# Patient Record
Sex: FEMALE | Race: OTHER
[De-identification: names, ages, dates, MRNs, and addresses within clinical notes are randomized per-mention and may not be internally consistent; named-entity substitution may affect disease eponyms.]

---

## 2017-12-27 ENCOUNTER — HOSPITAL ENCOUNTER (INPATIENT)
Dept: HOSPITAL 54 - ER | Age: 82
LOS: 14 days | Discharge: NURSING, REHAB OR LONG-TERM CARE | DRG: 870 | End: 2018-01-10
Attending: NURSE PRACTITIONER | Admitting: NURSE PRACTITIONER
Payer: MEDICARE

## 2017-12-27 VITALS — DIASTOLIC BLOOD PRESSURE: 93 MMHG | SYSTOLIC BLOOD PRESSURE: 145 MMHG

## 2017-12-27 VITALS — DIASTOLIC BLOOD PRESSURE: 71 MMHG | SYSTOLIC BLOOD PRESSURE: 132 MMHG

## 2017-12-27 VITALS — DIASTOLIC BLOOD PRESSURE: 46 MMHG | SYSTOLIC BLOOD PRESSURE: 144 MMHG

## 2017-12-27 VITALS — DIASTOLIC BLOOD PRESSURE: 75 MMHG | SYSTOLIC BLOOD PRESSURE: 145 MMHG

## 2017-12-27 VITALS — DIASTOLIC BLOOD PRESSURE: 71 MMHG | SYSTOLIC BLOOD PRESSURE: 128 MMHG

## 2017-12-27 VITALS — DIASTOLIC BLOOD PRESSURE: 61 MMHG | SYSTOLIC BLOOD PRESSURE: 114 MMHG

## 2017-12-27 VITALS — SYSTOLIC BLOOD PRESSURE: 129 MMHG | DIASTOLIC BLOOD PRESSURE: 76 MMHG

## 2017-12-27 VITALS — SYSTOLIC BLOOD PRESSURE: 125 MMHG | DIASTOLIC BLOOD PRESSURE: 65 MMHG

## 2017-12-27 VITALS — SYSTOLIC BLOOD PRESSURE: 129 MMHG | DIASTOLIC BLOOD PRESSURE: 71 MMHG

## 2017-12-27 VITALS — DIASTOLIC BLOOD PRESSURE: 64 MMHG | SYSTOLIC BLOOD PRESSURE: 142 MMHG

## 2017-12-27 VITALS — SYSTOLIC BLOOD PRESSURE: 142 MMHG | DIASTOLIC BLOOD PRESSURE: 90 MMHG

## 2017-12-27 VITALS — DIASTOLIC BLOOD PRESSURE: 47 MMHG | SYSTOLIC BLOOD PRESSURE: 144 MMHG

## 2017-12-27 VITALS — BODY MASS INDEX: 27.88 KG/M2 | HEIGHT: 60 IN | WEIGHT: 142 LBS

## 2017-12-27 VITALS — DIASTOLIC BLOOD PRESSURE: 63 MMHG | SYSTOLIC BLOOD PRESSURE: 134 MMHG

## 2017-12-27 VITALS — DIASTOLIC BLOOD PRESSURE: 67 MMHG | SYSTOLIC BLOOD PRESSURE: 124 MMHG

## 2017-12-27 VITALS — DIASTOLIC BLOOD PRESSURE: 84 MMHG | SYSTOLIC BLOOD PRESSURE: 142 MMHG

## 2017-12-27 VITALS — DIASTOLIC BLOOD PRESSURE: 69 MMHG | SYSTOLIC BLOOD PRESSURE: 127 MMHG

## 2017-12-27 VITALS — SYSTOLIC BLOOD PRESSURE: 126 MMHG | DIASTOLIC BLOOD PRESSURE: 71 MMHG

## 2017-12-27 VITALS — DIASTOLIC BLOOD PRESSURE: 96 MMHG | SYSTOLIC BLOOD PRESSURE: 147 MMHG

## 2017-12-27 VITALS — SYSTOLIC BLOOD PRESSURE: 134 MMHG | DIASTOLIC BLOOD PRESSURE: 63 MMHG

## 2017-12-27 VITALS — DIASTOLIC BLOOD PRESSURE: 88 MMHG | SYSTOLIC BLOOD PRESSURE: 128 MMHG

## 2017-12-27 VITALS — DIASTOLIC BLOOD PRESSURE: 43 MMHG | SYSTOLIC BLOOD PRESSURE: 130 MMHG

## 2017-12-27 VITALS — SYSTOLIC BLOOD PRESSURE: 115 MMHG | DIASTOLIC BLOOD PRESSURE: 45 MMHG

## 2017-12-27 VITALS — DIASTOLIC BLOOD PRESSURE: 73 MMHG | SYSTOLIC BLOOD PRESSURE: 121 MMHG

## 2017-12-27 VITALS — DIASTOLIC BLOOD PRESSURE: 80 MMHG | SYSTOLIC BLOOD PRESSURE: 137 MMHG

## 2017-12-27 VITALS — SYSTOLIC BLOOD PRESSURE: 130 MMHG | DIASTOLIC BLOOD PRESSURE: 68 MMHG

## 2017-12-27 VITALS — SYSTOLIC BLOOD PRESSURE: 128 MMHG | DIASTOLIC BLOOD PRESSURE: 71 MMHG

## 2017-12-27 VITALS — DIASTOLIC BLOOD PRESSURE: 90 MMHG | SYSTOLIC BLOOD PRESSURE: 155 MMHG

## 2017-12-27 VITALS — DIASTOLIC BLOOD PRESSURE: 63 MMHG | SYSTOLIC BLOOD PRESSURE: 114 MMHG

## 2017-12-27 VITALS — SYSTOLIC BLOOD PRESSURE: 123 MMHG | DIASTOLIC BLOOD PRESSURE: 71 MMHG

## 2017-12-27 VITALS — SYSTOLIC BLOOD PRESSURE: 126 MMHG | DIASTOLIC BLOOD PRESSURE: 65 MMHG

## 2017-12-27 VITALS — DIASTOLIC BLOOD PRESSURE: 67 MMHG | SYSTOLIC BLOOD PRESSURE: 120 MMHG

## 2017-12-27 DIAGNOSIS — K21.9: ICD-10-CM

## 2017-12-27 DIAGNOSIS — I11.0: ICD-10-CM

## 2017-12-27 DIAGNOSIS — N39.0: ICD-10-CM

## 2017-12-27 DIAGNOSIS — K94.22: ICD-10-CM
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
## 2017-12-27 DIAGNOSIS — Z99.11: ICD-10-CM

## 2017-12-27 DIAGNOSIS — L89.323: ICD-10-CM

## 2017-12-27 DIAGNOSIS — A41.9: Primary | ICD-10-CM
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
## 2017-12-27 DIAGNOSIS — L89.313: ICD-10-CM

## 2017-12-27 DIAGNOSIS — E43: ICD-10-CM

## 2017-12-27 DIAGNOSIS — I50.33: ICD-10-CM

## 2017-12-27 DIAGNOSIS — L89.324: ICD-10-CM

## 2017-12-27 DIAGNOSIS — L03.311: ICD-10-CM

## 2017-12-27 DIAGNOSIS — K31.6: ICD-10-CM

## 2017-12-27 DIAGNOSIS — D62: ICD-10-CM

## 2017-12-27 DIAGNOSIS — J96.21: ICD-10-CM

## 2017-12-27 DIAGNOSIS — Z87.81: ICD-10-CM

## 2017-12-27 DIAGNOSIS — L89.153: ICD-10-CM
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
## 2017-12-27 DIAGNOSIS — E87.0: ICD-10-CM

## 2017-12-27 DIAGNOSIS — F09: ICD-10-CM

## 2017-12-27 DIAGNOSIS — G93.40: ICD-10-CM

## 2017-12-27 DIAGNOSIS — K94.23: ICD-10-CM

## 2017-12-27 DIAGNOSIS — L89.314: ICD-10-CM

## 2017-12-27 DIAGNOSIS — F03.90: ICD-10-CM

## 2017-12-27 DIAGNOSIS — R13.10: ICD-10-CM

## 2017-12-27 DIAGNOSIS — B96.89: ICD-10-CM

## 2017-12-27 DIAGNOSIS — L89.619: ICD-10-CM

## 2017-12-27 DIAGNOSIS — D64.9: ICD-10-CM

## 2017-12-27 DIAGNOSIS — R53.2: ICD-10-CM

## 2017-12-27 DIAGNOSIS — Z79.899: ICD-10-CM

## 2017-12-27 DIAGNOSIS — L71.0: ICD-10-CM

## 2017-12-27 LAB
ALBUMIN SERPL BCP-MCNC: 1.2 G/DL (ref 3.4–5)
ALP SERPL-CCNC: 265 U/L (ref 46–116)
ALT SERPL W P-5'-P-CCNC: 27 U/L (ref 12–78)
APPEARANCE UR: (no result)
APTT PPP: 30 SEC (ref 23–34)
AST SERPL W P-5'-P-CCNC: 54 U/L (ref 15–37)
BASOPHILS # BLD AUTO: 0.1 /CMM (ref 0–0.2)
BASOPHILS NFR BLD AUTO: 0.4 % (ref 0–2)
BILIRUB DIRECT SERPL-MCNC: 0.2 MG/DL (ref 0–0.2)
BILIRUB SERPL-MCNC: 0.5 MG/DL (ref 0.2–1)
BILIRUB UR QL STRIP: NEGATIVE
BUN SERPL-MCNC: 43 MG/DL (ref 7–18)
CALCIUM SERPL-MCNC: 7.2 MG/DL (ref 8.5–10.1)
CHLORIDE SERPL-SCNC: 118 MMOL/L (ref 98–107)
CO2 SERPL-SCNC: 24 MMOL/L (ref 21–32)
COLOR UR: YELLOW
CREAT SERPL-MCNC: 0.9 MG/DL (ref 0.6–1.3)
EOSINOPHIL # BLD AUTO: 0.4 /CMM (ref 0–0.7)
EOSINOPHIL NFR BLD AUTO: 2.7 % (ref 0–6)
EOSINOPHIL NFR BLD MANUAL: 3 % (ref 0–4)
GLUCOSE SERPL-MCNC: 107 MG/DL (ref 74–106)
GLUCOSE UR STRIP-MCNC: NEGATIVE MG/DL
HCT VFR BLD AUTO: 21 % (ref 33–45)
HCT VFR BLD AUTO: 34 % (ref 33–45)
HGB BLD-MCNC: 11.2 G/DL (ref 11.5–14.8)
HGB BLD-MCNC: 6.7 G/DL (ref 11.5–14.8)
HGB UR QL STRIP: (no result) ERY/UL
INR PPP: 1.22 (ref 0.87–1.13)
KETONES UR STRIP-MCNC: NEGATIVE MG/DL
LEUKOCYTE ESTERASE UR QL STRIP: (no result)
LYMPHOCYTES NFR BLD AUTO: 17.4 % (ref 20–44)
LYMPHOCYTES NFR BLD AUTO: 2.4 /CMM (ref 0.8–4.8)
LYMPHOCYTES NFR BLD MANUAL: 20 % (ref 16–48)
MCH RBC QN AUTO: 31 PG (ref 26–33)
MCHC RBC AUTO-ENTMCNC: 33 G/DL (ref 31–36)
MCV RBC AUTO: 95 FL (ref 82–100)
MONOCYTES NFR BLD AUTO: 0.9 /CMM (ref 0.1–1.3)
MONOCYTES NFR BLD AUTO: 6.8 % (ref 2–12)
MONOCYTES NFR BLD MANUAL: 9 % (ref 0–11)
NEUTROPHILS # BLD AUTO: 10 /CMM (ref 1.8–8.9)
NEUTROPHILS NFR BLD AUTO: 72.7 % (ref 43–81)
NEUTS BAND NFR BLD MANUAL: 4 % (ref 0–5)
NEUTS SEG NFR BLD MANUAL: 64 % (ref 42–76)
NITRITE UR QL STRIP: POSITIVE
NT-PROBNP SERPL-MCNC: 6226 PG/ML (ref 0–125)
PH UR STRIP: 5.5 [PH] (ref 5–8)
PLATELET # BLD AUTO: 394 /CMM (ref 150–450)
POTASSIUM SERPL-SCNC: 4.6 MMOL/L (ref 3.5–5.1)
PROT SERPL-MCNC: 5.6 G/DL (ref 6.4–8.2)
PROT UR QL STRIP: (no result) MG/DL
PROTHROMBIN TIME: 12.7 SECS (ref 9.5–12.7)
RBC # BLD AUTO: 2.16 MIL/UL (ref 4–5.2)
RBC #/AREA URNS HPF: (no result) /HPF (ref 0–2)
RDW COEFFICIENT OF VARIATION: 24.7 (ref 11.5–15)
SODIUM SERPL-SCNC: 147 MMOL/L (ref 136–145)
TROPONIN I SERPL-MCNC: 0.03 NG/ML (ref 0–0.06)
UROBILINOGEN UR STRIP-MCNC: 0.2 EU/DL
WBC #/AREA URNS HPF: (no result) /HPF (ref 0–3)
WBC NRBC COR # BLD AUTO: 13.8 K/UL (ref 4.3–11)

## 2017-12-27 PROCEDURE — A6253 ABSORPT DRG > 48 SQ IN W/O B: HCPCS

## 2017-12-27 PROCEDURE — A7526 TRACHEOSTOMY TUBE COLLAR: HCPCS

## 2017-12-27 PROCEDURE — 30233N1 TRANSFUSION OF NONAUTOLOGOUS RED BLOOD CELLS INTO PERIPHERAL VEIN, PERCUTANEOUS APPROACH: ICD-10-PCS | Performed by: NURSE PRACTITIONER

## 2017-12-27 PROCEDURE — A6402 STERILE GAUZE <= 16 SQ IN: HCPCS

## 2017-12-27 PROCEDURE — B546ZZA ULTRASONOGRAPHY OF RIGHT SUBCLAVIAN VEIN, GUIDANCE: ICD-10-PCS | Performed by: NURSE PRACTITIONER

## 2017-12-27 PROCEDURE — Z7610: HCPCS

## 2017-12-27 PROCEDURE — A4216 STERILE WATER/SALINE, 10 ML: HCPCS

## 2017-12-27 PROCEDURE — 05H533Z INSERTION OF INFUSION DEVICE INTO RIGHT SUBCLAVIAN VEIN, PERCUTANEOUS APPROACH: ICD-10-PCS | Performed by: NURSE PRACTITIONER

## 2017-12-27 PROCEDURE — 0DC68ZZ EXTIRPATION OF MATTER FROM STOMACH, VIA NATURAL OR ARTIFICIAL OPENING ENDOSCOPIC: ICD-10-PCS

## 2017-12-27 PROCEDURE — 5A1955Z RESPIRATORY VENTILATION, GREATER THAN 96 CONSECUTIVE HOURS: ICD-10-PCS

## 2017-12-27 PROCEDURE — A6403 STERILE GAUZE>16 <= 48 SQ IN: HCPCS

## 2017-12-27 PROCEDURE — A4606 OXYGEN PROBE USED W OXIMETER: HCPCS

## 2017-12-27 PROCEDURE — 30233N1 TRANSFUSION OF NONAUTOLOGOUS RED BLOOD CELLS INTO PERIPHERAL VEIN, PERCUTANEOUS APPROACH: ICD-10-PCS | Performed by: STUDENT IN AN ORGANIZED HEALTH CARE EDUCATION/TRAINING PROGRAM

## 2017-12-27 PROCEDURE — A6248 HYDROGEL DRSG GEL FILLER: HCPCS

## 2017-12-27 RX ADMIN — FAMOTIDINE SCH MG: 20 TABLET, FILM COATED ORAL at 09:00

## 2017-12-27 RX ADMIN — OXYCODONE HYDROCHLORIDE AND ACETAMINOPHEN SCH MG: 500 TABLET ORAL at 10:01

## 2017-12-27 RX ADMIN — Medication SCH EACH: at 09:00

## 2017-12-27 RX ADMIN — Medication SCH EACH: at 17:18

## 2017-12-27 RX ADMIN — ALBUTEROL SULFATE SCH MG: 2.5 SOLUTION RESPIRATORY (INHALATION) at 13:29

## 2017-12-27 RX ADMIN — FAMOTIDINE SCH MG: 20 TABLET, FILM COATED ORAL at 17:18

## 2017-12-27 RX ADMIN — Medication SCH GM: at 12:09

## 2017-12-27 RX ADMIN — MEROPENEM SCH MLS/HR: 500 INJECTION INTRAVENOUS at 18:43

## 2017-12-27 RX ADMIN — Medication SCH MG: at 10:02

## 2017-12-27 RX ADMIN — THERA TABS SCH UDTAB: TAB at 09:57

## 2017-12-27 RX ADMIN — FERROUS SULFATE TAB 325 MG (65 MG ELEMENTAL FE) SCH MG: 325 (65 FE) TAB at 09:56

## 2017-12-27 RX ADMIN — ERGOCALCIFEROL SCH UNIT: 1.25 CAPSULE ORAL at 10:46

## 2017-12-27 RX ADMIN — OXYCODONE HYDROCHLORIDE AND ACETAMINOPHEN SCH MG: 500 TABLET ORAL at 17:18

## 2017-12-27 RX ADMIN — Medication SCH MG: at 19:30

## 2017-12-27 RX ADMIN — Medication SCH MG: at 13:29

## 2017-12-27 RX ADMIN — ALBUTEROL SULFATE SCH MG: 2.5 SOLUTION RESPIRATORY (INHALATION) at 19:30

## 2017-12-27 RX ADMIN — SODIUM CHLORIDE PRN MLS/HR: 9 INJECTION, SOLUTION INTRAVENOUS at 08:14

## 2017-12-27 NOTE — NUR
RT

PATIENT REC'D TRACHED ON [facility] VENT KAMALJIT WELL. VENT ALARMS CHECKED + AUDIBLE. SX'D WITH MOD 
AMT OF PALE YELLOW SEMITHICK SECRETIONS. B/S JAD. WON BAG AT HOB

-------------------------------------------------------------------------------

Addendum: 12/27/17 at 1248 by MARIA FERNANDA TIAN RT

-------------------------------------------------------------------------------

Amended: Links added.

## 2017-12-27 NOTE — NUR
PT BIB RA 90 WITH A C/O FEVER. PT IS TRACH VENTED WITH THE FOLLOWING SETTINGS: 
AC 12, , TOE043%, PEEP 5. PT IS NON RESPONSIVE TO PAINFUL STIMULI, PT'S 
BASELINE AS PER EMS. PT IS ON THE MONITOR AND CONTINUOUS PULSE OX. PT'S 
DAUGHTER IS IN THE LOBBY. PT HAS RUE PICC LINE, ALAMO TO GRAVITY, GTUBE, WOUND 
ON COCCYX AND RT HIP FX THAT HAS NOT BEEN CORRECTED.  PT HAS LLE DEFORMITY FROM 
CHILDHOOD.

## 2017-12-27 NOTE — NUR
-------------------------------------------------------------------------------

          *** Note undone in [facility] - 12/27/17 at 0504 by JEFF ***          

-------------------------------------------------------------------------------

US FINISHED

## 2017-12-27 NOTE — NUR
PT RECEIVED TRACHED SHLY 6 ON VENT. NO RESP DISTRESS. PT TOLERATING VENT SETTINGS. SX'D FOR 
MOD AMT OF TINGED SECRETIONS. VENT ALARMS SET AND AUDIBLE. AMBU BAG AT BEDSIDE. VENT PLUGGED 
INTO RED OUTLET. WILL CONTINUE TO MONITOR.

-------------------------------------------------------------------------------

Addendum: 12/27/17 at 2020 by ANDREE GRIJALVA RT

-------------------------------------------------------------------------------

Amended: Links added.

## 2017-12-27 NOTE — NUR
ICU RN- STOOL SAMPLE OBTAINED FOR STOOL OB AND PLACED IN FRIDGE FOR LAB . WILL 
CONTINUE TO MONITOR.

## 2017-12-27 NOTE — NUR
WOUND CARE CONSULT: PT PRESENTS WITH MULTIPLE WOUNDS INCLUDING RT BUTTOCK STAGE 4 ULCER, 
LEFT BUTTOCK AND SACRAL STAGE 3 ULCERS AND G TUBE SITE REDNESS, ALL PRESENT ON ADMISSION. PT 
IS TRACH-VENT DEPENDENT AND IMMOBILE. FIRST STEP MATTRESS IN USE. ALL SKIN PROTECTION AND 
WOUND RECOMMENDATIONS DISCUSSED WITH NURSING STAFF. RECOMMEND SURGICAL CONSULT. WILL SEE 
PRN. MD IN AGREEMENT WITH PLAN OF CARE.

## 2017-12-27 NOTE — NUR
ICU RN- MEDICATIONS NON-ADMINISTERED DUE TO LEAKING OF G-TUBE STOMA SITE. WILL INFORM SELAM GALLEGO DNP WHEN NP SEEING PT. WILL CONTINUE TO MONITOR.

## 2017-12-27 NOTE — NUR
ICU RN- EAGLE MONTOYA NP AT BEDSIDE. UPDATED HER ON PT'S G-TUBE: LEAKING, RED & SWOLLEN. PER 
NP, OBTAIN CONSENT FOR EGD AND POSSIBLE PEG TUBE REPLACEMENT.

1330- OBTAINED TELEPHONE CONSENT FROM PT'S DAUGHTER, LUCIO FOR EGD AND POSSIBLE PEG TUBE 
REPLACEMENT. VERIFIED WITH LEFTY MADRIGAL. 

1430- 1ST UNIT OF BLOOD COMPLETED. DR. HESTER AND OR STAFF AT BEDSIDE FOR EGD. WILL START 
2ND UNIT OF BLOOD ONCE EGD COMPLETED.

## 2017-12-28 VITALS — SYSTOLIC BLOOD PRESSURE: 152 MMHG | DIASTOLIC BLOOD PRESSURE: 51 MMHG

## 2017-12-28 VITALS — SYSTOLIC BLOOD PRESSURE: 151 MMHG | DIASTOLIC BLOOD PRESSURE: 86 MMHG

## 2017-12-28 VITALS — SYSTOLIC BLOOD PRESSURE: 146 MMHG | DIASTOLIC BLOOD PRESSURE: 86 MMHG

## 2017-12-28 VITALS — SYSTOLIC BLOOD PRESSURE: 135 MMHG | DIASTOLIC BLOOD PRESSURE: 91 MMHG

## 2017-12-28 VITALS — DIASTOLIC BLOOD PRESSURE: 82 MMHG | SYSTOLIC BLOOD PRESSURE: 142 MMHG

## 2017-12-28 VITALS — SYSTOLIC BLOOD PRESSURE: 137 MMHG | DIASTOLIC BLOOD PRESSURE: 89 MMHG

## 2017-12-28 LAB
BASOPHILS # BLD AUTO: 0.1 /CMM (ref 0–0.2)
BASOPHILS NFR BLD AUTO: 0.7 % (ref 0–2)
BUN SERPL-MCNC: 39 MG/DL (ref 7–18)
CALCIUM SERPL-MCNC: 7 MG/DL (ref 8.5–10.1)
CHLORIDE SERPL-SCNC: 118 MMOL/L (ref 98–107)
CHOLEST SERPL-MCNC: 112 MG/DL (ref ?–200)
CO2 SERPL-SCNC: 19 MMOL/L (ref 21–32)
CREAT SERPL-MCNC: 0.8 MG/DL (ref 0.6–1.3)
EOSINOPHIL # BLD AUTO: 0.3 /CMM (ref 0–0.7)
EOSINOPHIL NFR BLD AUTO: 2.1 % (ref 0–6)
GLUCOSE SERPL-MCNC: 79 MG/DL (ref 74–106)
HCT VFR BLD AUTO: 34 % (ref 33–45)
HDLC SERPL-MCNC: 10 MG/DL (ref 40–60)
HGB BLD-MCNC: 11.7 G/DL (ref 11.5–14.8)
LDLC SERPL DIRECT ASSAY-MCNC: 69 MG/DL (ref 0–99)
LYMPHOCYTES NFR BLD AUTO: 1.6 /CMM (ref 0.8–4.8)
LYMPHOCYTES NFR BLD AUTO: 11.9 % (ref 20–44)
MAGNESIUM SERPL-MCNC: 1.5 MG/DL (ref 1.8–2.4)
MCH RBC QN AUTO: 31 PG (ref 26–33)
MCHC RBC AUTO-ENTMCNC: 34 G/DL (ref 31–36)
MCV RBC AUTO: 90 FL (ref 82–100)
MONOCYTES NFR BLD AUTO: 0.8 /CMM (ref 0.1–1.3)
MONOCYTES NFR BLD AUTO: 5.6 % (ref 2–12)
NEUTROPHILS # BLD AUTO: 10.9 /CMM (ref 1.8–8.9)
NEUTROPHILS NFR BLD AUTO: 79.7 % (ref 43–81)
PHOSPHATE SERPL-MCNC: 3.3 MG/DL (ref 2.5–4.9)
PLATELET # BLD AUTO: 391 /CMM (ref 150–450)
POTASSIUM SERPL-SCNC: 4.2 MMOL/L (ref 3.5–5.1)
RBC # BLD AUTO: 3.79 MIL/UL (ref 4–5.2)
RDW COEFFICIENT OF VARIATION: 21.6 (ref 11.5–15)
SODIUM SERPL-SCNC: 147 MMOL/L (ref 136–145)
TRIGL SERPL-MCNC: 111 MG/DL (ref 30–150)
WBC NRBC COR # BLD AUTO: 13.6 K/UL (ref 4.3–11)

## 2017-12-28 RX ADMIN — FERROUS SULFATE TAB 325 MG (65 MG ELEMENTAL FE) SCH MG: 325 (65 FE) TAB at 09:35

## 2017-12-28 RX ADMIN — Medication SCH MG: at 07:49

## 2017-12-28 RX ADMIN — Medication PRN ML: at 18:17

## 2017-12-28 RX ADMIN — FAMOTIDINE SCH MG: 20 TABLET, FILM COATED ORAL at 09:36

## 2017-12-28 RX ADMIN — Medication SCH GM: at 09:36

## 2017-12-28 RX ADMIN — Medication SCH MG: at 09:34

## 2017-12-28 RX ADMIN — Medication SCH MG: at 20:09

## 2017-12-28 RX ADMIN — Medication SCH MG: at 01:30

## 2017-12-28 RX ADMIN — Medication PRN EACH: at 09:35

## 2017-12-28 RX ADMIN — SODIUM CHLORIDE PRN MLS/HR: 9 INJECTION, SOLUTION INTRAVENOUS at 17:37

## 2017-12-28 RX ADMIN — FAMOTIDINE SCH MG: 20 TABLET, FILM COATED ORAL at 17:35

## 2017-12-28 RX ADMIN — MAGNESIUM SULFATE IN DEXTROSE SCH MLS/HR: 10 INJECTION, SOLUTION INTRAVENOUS at 13:45

## 2017-12-28 RX ADMIN — ALBUTEROL SULFATE SCH MG: 2.5 SOLUTION RESPIRATORY (INHALATION) at 07:49

## 2017-12-28 RX ADMIN — Medication SCH MG: at 13:35

## 2017-12-28 RX ADMIN — THERA TABS SCH UDTAB: TAB at 09:36

## 2017-12-28 RX ADMIN — OXYCODONE HYDROCHLORIDE AND ACETAMINOPHEN SCH MG: 500 TABLET ORAL at 17:35

## 2017-12-28 RX ADMIN — MEROPENEM SCH MLS/HR: 500 INJECTION INTRAVENOUS at 05:22

## 2017-12-28 RX ADMIN — MEROPENEM SCH MLS/HR: 500 INJECTION INTRAVENOUS at 17:31

## 2017-12-28 RX ADMIN — OXYCODONE HYDROCHLORIDE AND ACETAMINOPHEN SCH MG: 500 TABLET ORAL at 09:35

## 2017-12-28 RX ADMIN — Medication SCH EACH: at 09:34

## 2017-12-28 RX ADMIN — Medication SCH EACH: at 17:35

## 2017-12-28 RX ADMIN — ALBUTEROL SULFATE SCH MG: 2.5 SOLUTION RESPIRATORY (INHALATION) at 01:30

## 2017-12-28 RX ADMIN — MAGNESIUM SULFATE IN DEXTROSE SCH MLS/HR: 10 INJECTION, SOLUTION INTRAVENOUS at 11:27

## 2017-12-28 RX ADMIN — ALBUTEROL SULFATE SCH MG: 2.5 SOLUTION RESPIRATORY (INHALATION) at 13:36

## 2017-12-28 RX ADMIN — ALBUTEROL SULFATE SCH MG: 2.5 SOLUTION RESPIRATORY (INHALATION) at 20:09

## 2017-12-28 NOTE — NUR
Received pt on vent support AC 12,450,35,+5, pt stable on current settings no distress noted 
at this time, WILL CONTINUE MONITORING PT PER MDS ORDERS. 

-------------------------------------------------------------------------------

Addendum: 12/28/17 at 2011 by GOMEZ VILLEGAS RT

-------------------------------------------------------------------------------

Amended: Links added.

## 2017-12-28 NOTE — NUR
TELE RN INITIAL NOTES



RECEIVED PATIENT ASLEEP, OBTUNDED, RESPONSIVE TO TOUCH.  NO S/S OF PAIN OR DISCOMFORT.  VENT 
DEPENDENT, WITH VENT SETTINGS AC 12, , FIO2 35%, PEEP 5, SPO2 100%.  NO RESPIRATORY 
DISTRESS NOTED.  ON TELE MONITOR SR.  WITH LEFT NARE NGT, PATENT, INTACT, IN PLACE.  GTF AT 
20ML/HR, NOTED WITH 20ML RESIDUAL, WILL TITRATE TO GOAL RATE OF 40ML/HR AS TOLERATED.  NOTED 
WITH GENERALIZED EDEMA.  IVF RUNNING.  HOB KEPT ELEVATED.  SIDE RAILS UP AND LOCKED.  BED 
KEPT AT LOWEST POSITION.  WILL CONTINUE TO MONITOR.

## 2017-12-28 NOTE — NUR
RN NOTE

TUBE FEEDING STARTED TODAY VIA NGT TUBE PER DIETITIAN RECOMMENDATION. WILL ENDORSE TO NIGHT 
SHIFT NURSE.

## 2017-12-28 NOTE — NUR
RN NOTE



PT REMAINS IN NO ACUTE DISTRESS IN BED. PT DID NOT HAVE ANY SIGNIFICANT CHANGE IN CONDITION 
DURING SHIFT. ALL NEEDS MET, ALL ORDERS CARRIED OUT. WILL ENDORSE CARE TO AM RN FOR 
CONTINUITY OF CARE.

## 2017-12-29 VITALS — DIASTOLIC BLOOD PRESSURE: 76 MMHG | SYSTOLIC BLOOD PRESSURE: 140 MMHG

## 2017-12-29 VITALS — SYSTOLIC BLOOD PRESSURE: 149 MMHG | DIASTOLIC BLOOD PRESSURE: 73 MMHG

## 2017-12-29 VITALS — SYSTOLIC BLOOD PRESSURE: 114 MMHG | DIASTOLIC BLOOD PRESSURE: 35 MMHG

## 2017-12-29 VITALS — SYSTOLIC BLOOD PRESSURE: 137 MMHG | DIASTOLIC BLOOD PRESSURE: 89 MMHG

## 2017-12-29 VITALS — SYSTOLIC BLOOD PRESSURE: 116 MMHG | DIASTOLIC BLOOD PRESSURE: 76 MMHG

## 2017-12-29 VITALS — DIASTOLIC BLOOD PRESSURE: 43 MMHG | SYSTOLIC BLOOD PRESSURE: 149 MMHG

## 2017-12-29 RX ADMIN — ALBUTEROL SULFATE SCH MG: 2.5 SOLUTION RESPIRATORY (INHALATION) at 13:30

## 2017-12-29 RX ADMIN — OXYCODONE HYDROCHLORIDE AND ACETAMINOPHEN SCH MG: 500 TABLET ORAL at 09:09

## 2017-12-29 RX ADMIN — FERROUS SULFATE TAB 325 MG (65 MG ELEMENTAL FE) SCH MG: 325 (65 FE) TAB at 09:09

## 2017-12-29 RX ADMIN — OXYCODONE HYDROCHLORIDE AND ACETAMINOPHEN SCH MG: 500 TABLET ORAL at 17:21

## 2017-12-29 RX ADMIN — FAMOTIDINE SCH MG: 20 TABLET, FILM COATED ORAL at 09:09

## 2017-12-29 RX ADMIN — ALBUTEROL SULFATE SCH MG: 2.5 SOLUTION RESPIRATORY (INHALATION) at 07:34

## 2017-12-29 RX ADMIN — MEROPENEM SCH MLS/HR: 500 INJECTION INTRAVENOUS at 17:21

## 2017-12-29 RX ADMIN — Medication SCH MG: at 13:30

## 2017-12-29 RX ADMIN — FAMOTIDINE SCH MG: 20 TABLET, FILM COATED ORAL at 17:21

## 2017-12-29 RX ADMIN — SODIUM CHLORIDE PRN MLS/HR: 9 INJECTION, SOLUTION INTRAVENOUS at 23:48

## 2017-12-29 RX ADMIN — MEROPENEM SCH MLS/HR: 500 INJECTION INTRAVENOUS at 05:21

## 2017-12-29 RX ADMIN — Medication SCH MG: at 20:10

## 2017-12-29 RX ADMIN — Medication SCH MG: at 01:42

## 2017-12-29 RX ADMIN — THERA TABS SCH UDTAB: TAB at 09:09

## 2017-12-29 RX ADMIN — DEXTROSE MONOHYDRATE SCH MLS/HR: 50 INJECTION, SOLUTION INTRAVENOUS at 20:56

## 2017-12-29 RX ADMIN — Medication SCH EACH: at 17:20

## 2017-12-29 RX ADMIN — Medication SCH EACH: at 09:09

## 2017-12-29 RX ADMIN — SODIUM CHLORIDE PRN MLS/HR: 9 INJECTION, SOLUTION INTRAVENOUS at 05:21

## 2017-12-29 RX ADMIN — Medication SCH MG: at 07:34

## 2017-12-29 RX ADMIN — ALBUTEROL SULFATE SCH MG: 2.5 SOLUTION RESPIRATORY (INHALATION) at 01:42

## 2017-12-29 RX ADMIN — ALBUTEROL SULFATE SCH MG: 2.5 SOLUTION RESPIRATORY (INHALATION) at 20:10

## 2017-12-29 RX ADMIN — Medication SCH MG: at 09:09

## 2017-12-29 RX ADMIN — Medication SCH GM: at 09:09

## 2017-12-29 NOTE — NUR
RN NOTES



RECEIVED PATIENT IN BED WITH EYES CLOSED. NO DISTRESS NOTED. BREATHING EVEN AND UNLABORED. 
VENT SETTING WELL TOLERATED. NON VERBAL. NO PHYSICAL MANIFESTATION OF PAIN OR DISCOMFORT. FC 
PATENT AND INTACT DRAINING CLEAR YELLOW WITH NO DOUL ORDOR URINE. FEEDING WELL TOLERATED VIA 
NGT TO LEFT NARE. NO RESIDUAL NOTED. VITAL SIGNS WNL. KEPT CLEAN AND DRY. WILL CONTINUE TO 
MONITOR.

## 2017-12-29 NOTE — NUR
PT REC'D ON [facility] VENT ON NOTED SETTINGS AS CHARTED. PT IS COMFORTABLE AND NO RESPIRATORY 
DISTRESS NOTED. VENT PLUGGED INTO RED OUTLET, AMBU BAG BEDSIDE, ALARMS ARE SET AND AUDIBLE 
PER POLICY. WILL CONTINUE TO MONITOR.

-------------------------------------------------------------------------------

Addendum: 12/30/17 at 0538 by J CARLOS BREAUX RT

-------------------------------------------------------------------------------

Amended: Links added.

## 2017-12-30 VITALS — DIASTOLIC BLOOD PRESSURE: 62 MMHG | SYSTOLIC BLOOD PRESSURE: 123 MMHG

## 2017-12-30 VITALS — DIASTOLIC BLOOD PRESSURE: 66 MMHG | SYSTOLIC BLOOD PRESSURE: 127 MMHG

## 2017-12-30 VITALS — SYSTOLIC BLOOD PRESSURE: 127 MMHG | DIASTOLIC BLOOD PRESSURE: 106 MMHG

## 2017-12-30 VITALS — DIASTOLIC BLOOD PRESSURE: 60 MMHG | SYSTOLIC BLOOD PRESSURE: 157 MMHG

## 2017-12-30 VITALS — DIASTOLIC BLOOD PRESSURE: 56 MMHG | SYSTOLIC BLOOD PRESSURE: 145 MMHG

## 2017-12-30 VITALS — DIASTOLIC BLOOD PRESSURE: 50 MMHG | SYSTOLIC BLOOD PRESSURE: 158 MMHG

## 2017-12-30 VITALS — DIASTOLIC BLOOD PRESSURE: 50 MMHG | SYSTOLIC BLOOD PRESSURE: 133 MMHG

## 2017-12-30 LAB
BASOPHILS # BLD AUTO: 0 /CMM (ref 0–0.2)
BASOPHILS NFR BLD AUTO: 0.3 % (ref 0–2)
BUN SERPL-MCNC: 45 MG/DL (ref 7–18)
CALCIUM SERPL-MCNC: 7.3 MG/DL (ref 8.5–10.1)
CHLORIDE SERPL-SCNC: 119 MMOL/L (ref 98–107)
CO2 SERPL-SCNC: 18 MMOL/L (ref 21–32)
CREAT SERPL-MCNC: 0.8 MG/DL (ref 0.6–1.3)
EOSINOPHIL # BLD AUTO: 0.5 /CMM (ref 0–0.7)
EOSINOPHIL NFR BLD AUTO: 4.8 % (ref 0–6)
GLUCOSE SERPL-MCNC: 93 MG/DL (ref 74–106)
HCT VFR BLD AUTO: 32 % (ref 33–45)
HGB BLD-MCNC: 10.9 G/DL (ref 11.5–14.8)
LYMPHOCYTES NFR BLD AUTO: 17.3 % (ref 20–44)
LYMPHOCYTES NFR BLD AUTO: 2 /CMM (ref 0.8–4.8)
MAGNESIUM SERPL-MCNC: 1.8 MG/DL (ref 1.8–2.4)
MCH RBC QN AUTO: 31 PG (ref 26–33)
MCHC RBC AUTO-ENTMCNC: 34 G/DL (ref 31–36)
MCV RBC AUTO: 90 FL (ref 82–100)
MONOCYTES NFR BLD AUTO: 1.1 /CMM (ref 0.1–1.3)
MONOCYTES NFR BLD AUTO: 9.9 % (ref 2–12)
NEUTROPHILS # BLD AUTO: 7.8 /CMM (ref 1.8–8.9)
NEUTROPHILS NFR BLD AUTO: 67.7 % (ref 43–81)
PHOSPHATE SERPL-MCNC: 3.3 MG/DL (ref 2.5–4.9)
PLATELET # BLD AUTO: 467 /CMM (ref 150–450)
POTASSIUM SERPL-SCNC: 3.9 MMOL/L (ref 3.5–5.1)
RBC # BLD AUTO: 3.54 MIL/UL (ref 4–5.2)
RDW COEFFICIENT OF VARIATION: 21.1 (ref 11.5–15)
SODIUM SERPL-SCNC: 147 MMOL/L (ref 136–145)
WBC NRBC COR # BLD AUTO: 11.4 K/UL (ref 4.3–11)

## 2017-12-30 RX ADMIN — Medication SCH GM: at 08:04

## 2017-12-30 RX ADMIN — SODIUM CHLORIDE PRN MLS/HR: 9 INJECTION, SOLUTION INTRAVENOUS at 20:56

## 2017-12-30 RX ADMIN — FAMOTIDINE SCH MG: 20 TABLET, FILM COATED ORAL at 08:04

## 2017-12-30 RX ADMIN — Medication SCH MG: at 07:34

## 2017-12-30 RX ADMIN — Medication SCH MG: at 01:57

## 2017-12-30 RX ADMIN — Medication SCH EACH: at 08:04

## 2017-12-30 RX ADMIN — OXYCODONE HYDROCHLORIDE AND ACETAMINOPHEN SCH MG: 500 TABLET ORAL at 16:04

## 2017-12-30 RX ADMIN — ALBUTEROL SULFATE SCH MG: 2.5 SOLUTION RESPIRATORY (INHALATION) at 13:47

## 2017-12-30 RX ADMIN — Medication SCH MG: at 13:47

## 2017-12-30 RX ADMIN — ALBUTEROL SULFATE SCH MG: 2.5 SOLUTION RESPIRATORY (INHALATION) at 20:12

## 2017-12-30 RX ADMIN — ALBUTEROL SULFATE SCH MG: 2.5 SOLUTION RESPIRATORY (INHALATION) at 01:57

## 2017-12-30 RX ADMIN — ALBUTEROL SULFATE SCH MG: 2.5 SOLUTION RESPIRATORY (INHALATION) at 07:34

## 2017-12-30 RX ADMIN — DEXTROSE MONOHYDRATE SCH MLS/HR: 50 INJECTION, SOLUTION INTRAVENOUS at 04:41

## 2017-12-30 RX ADMIN — Medication SCH MG: at 20:12

## 2017-12-30 RX ADMIN — OXYCODONE HYDROCHLORIDE AND ACETAMINOPHEN SCH MG: 500 TABLET ORAL at 08:04

## 2017-12-30 RX ADMIN — FERROUS SULFATE TAB 325 MG (65 MG ELEMENTAL FE) SCH MG: 325 (65 FE) TAB at 08:04

## 2017-12-30 RX ADMIN — DEXTROSE MONOHYDRATE SCH MLS/HR: 50 INJECTION, SOLUTION INTRAVENOUS at 20:55

## 2017-12-30 RX ADMIN — Medication SCH MG: at 08:04

## 2017-12-30 RX ADMIN — THERA TABS SCH UDTAB: TAB at 08:04

## 2017-12-30 RX ADMIN — DEXTROSE MONOHYDRATE SCH MLS/HR: 50 INJECTION, SOLUTION INTRAVENOUS at 11:09

## 2017-12-30 RX ADMIN — FAMOTIDINE SCH MG: 20 TABLET, FILM COATED ORAL at 16:04

## 2017-12-30 RX ADMIN — Medication PRN ML: at 08:17

## 2017-12-30 RX ADMIN — Medication SCH EACH: at 16:04

## 2017-12-30 NOTE — NUR
Trach pt received, tolerating current vent settings well throughout the shift.  Pt trach is 
secure.   Vent is plugged into a red outlet, alarms are set and audible, and BVM is at 
bedside.

-------------------------------------------------------------------------------

Addendum: 12/30/17 at 1728 by HUNTER TIWARI RT

-------------------------------------------------------------------------------

Amended: Links added.

## 2017-12-30 NOTE — NUR
TELE RN CLOSING NOTES

PATIENT IS RESTING IN BED IN NO APPARENT DISTRESS. BEDSIDE RAILS ARE UP X2. BED IS LOCKED 
AND LOWERED. CALL LIGHT IS WITHIN REACH. WILL ENDORSE CARE TO NIGHT SHIFT NURSE FOR MAISHA.

## 2017-12-30 NOTE — NUR
RN CLOSING NOTES



NO SIGNIFICANT CHANGE OF CONDITION. NO DISTRESS NOTED. BREATHING EVEN AND UNLABORED. VITAL 
SIGNS WNL. OBTUNDED, NON VERBAL. WILL ENDORSE TO AM SHIFT FOR CONTINUITY OF CARE.

## 2017-12-30 NOTE — NUR
TAYO RN OPENING NOTES

RECEIVED PATIENT IN STABLE CONDITION. IN NO APPARENT DISTRESS. PATIENT IS RESTING IN BED. 
BEDSIDE RAILS ARE UP X2. BED IS LOCKED AND LOWERED. WILL CONTINUE TO MONITOR.

## 2017-12-31 VITALS — DIASTOLIC BLOOD PRESSURE: 92 MMHG | SYSTOLIC BLOOD PRESSURE: 179 MMHG

## 2017-12-31 VITALS — DIASTOLIC BLOOD PRESSURE: 79 MMHG | SYSTOLIC BLOOD PRESSURE: 140 MMHG

## 2017-12-31 VITALS — SYSTOLIC BLOOD PRESSURE: 180 MMHG | DIASTOLIC BLOOD PRESSURE: 90 MMHG

## 2017-12-31 VITALS — SYSTOLIC BLOOD PRESSURE: 146 MMHG | DIASTOLIC BLOOD PRESSURE: 50 MMHG

## 2017-12-31 VITALS — DIASTOLIC BLOOD PRESSURE: 88 MMHG | SYSTOLIC BLOOD PRESSURE: 139 MMHG

## 2017-12-31 VITALS — SYSTOLIC BLOOD PRESSURE: 133 MMHG | DIASTOLIC BLOOD PRESSURE: 84 MMHG

## 2017-12-31 VITALS — SYSTOLIC BLOOD PRESSURE: 102 MMHG

## 2017-12-31 LAB
BASOPHILS # BLD AUTO: 0.1 /CMM (ref 0–0.2)
BASOPHILS NFR BLD AUTO: 0.7 % (ref 0–2)
BUN SERPL-MCNC: 50 MG/DL (ref 7–18)
CALCIUM SERPL-MCNC: 7.3 MG/DL (ref 8.5–10.1)
CHLORIDE SERPL-SCNC: 123 MMOL/L (ref 98–107)
CO2 SERPL-SCNC: 18 MMOL/L (ref 21–32)
CREAT SERPL-MCNC: 0.7 MG/DL (ref 0.6–1.3)
EOSINOPHIL # BLD AUTO: 0.7 /CMM (ref 0–0.7)
EOSINOPHIL NFR BLD AUTO: 5.5 % (ref 0–6)
GLUCOSE SERPL-MCNC: 103 MG/DL (ref 74–106)
HCT VFR BLD AUTO: 32 % (ref 33–45)
HGB BLD-MCNC: 11 G/DL (ref 11.5–14.8)
LYMPHOCYTES NFR BLD AUTO: 1.9 /CMM (ref 0.8–4.8)
LYMPHOCYTES NFR BLD AUTO: 15.9 % (ref 20–44)
MAGNESIUM SERPL-MCNC: 1.8 MG/DL (ref 1.8–2.4)
MCH RBC QN AUTO: 31 PG (ref 26–33)
MCHC RBC AUTO-ENTMCNC: 34 G/DL (ref 31–36)
MCV RBC AUTO: 91 FL (ref 82–100)
MONOCYTES NFR BLD AUTO: 1 /CMM (ref 0.1–1.3)
MONOCYTES NFR BLD AUTO: 7.8 % (ref 2–12)
NEUTROPHILS # BLD AUTO: 8.5 /CMM (ref 1.8–8.9)
NEUTROPHILS NFR BLD AUTO: 70.1 % (ref 43–81)
PHOSPHATE SERPL-MCNC: 3.6 MG/DL (ref 2.5–4.9)
PLATELET # BLD AUTO: 481 /CMM (ref 150–450)
POTASSIUM SERPL-SCNC: 4.2 MMOL/L (ref 3.5–5.1)
RBC # BLD AUTO: 3.55 MIL/UL (ref 4–5.2)
RDW COEFFICIENT OF VARIATION: 21.6 (ref 11.5–15)
SODIUM SERPL-SCNC: 152 MMOL/L (ref 136–145)
WBC NRBC COR # BLD AUTO: 12.2 K/UL (ref 4.3–11)

## 2017-12-31 RX ADMIN — Medication PRN ML: at 12:31

## 2017-12-31 RX ADMIN — Medication SCH MG: at 02:14

## 2017-12-31 RX ADMIN — DEXTROSE MONOHYDRATE SCH MLS/HR: 50 INJECTION, SOLUTION INTRAVENOUS at 04:48

## 2017-12-31 RX ADMIN — OXYCODONE HYDROCHLORIDE AND ACETAMINOPHEN SCH MG: 500 TABLET ORAL at 16:28

## 2017-12-31 RX ADMIN — ALBUTEROL SULFATE SCH MG: 2.5 SOLUTION RESPIRATORY (INHALATION) at 02:14

## 2017-12-31 RX ADMIN — FAMOTIDINE SCH MG: 20 TABLET, FILM COATED ORAL at 16:28

## 2017-12-31 RX ADMIN — ALBUTEROL SULFATE SCH MG: 2.5 SOLUTION RESPIRATORY (INHALATION) at 07:21

## 2017-12-31 RX ADMIN — FERROUS SULFATE TAB 325 MG (65 MG ELEMENTAL FE) SCH MG: 325 (65 FE) TAB at 09:08

## 2017-12-31 RX ADMIN — DEXTROSE MONOHYDRATE SCH MLS/HR: 50 INJECTION, SOLUTION INTRAVENOUS at 21:39

## 2017-12-31 RX ADMIN — Medication SCH GM: at 09:09

## 2017-12-31 RX ADMIN — ALBUTEROL SULFATE SCH MG: 2.5 SOLUTION RESPIRATORY (INHALATION) at 20:22

## 2017-12-31 RX ADMIN — FAMOTIDINE SCH MG: 20 TABLET, FILM COATED ORAL at 09:09

## 2017-12-31 RX ADMIN — ALBUTEROL SULFATE SCH MG: 2.5 SOLUTION RESPIRATORY (INHALATION) at 13:25

## 2017-12-31 RX ADMIN — Medication SCH MG: at 20:22

## 2017-12-31 RX ADMIN — Medication SCH MG: at 13:25

## 2017-12-31 RX ADMIN — Medication SCH EACH: at 09:09

## 2017-12-31 RX ADMIN — DEXTROSE MONOHYDRATE SCH MLS/HR: 50 INJECTION, SOLUTION INTRAVENOUS at 12:20

## 2017-12-31 RX ADMIN — Medication SCH EACH: at 16:28

## 2017-12-31 RX ADMIN — SODIUM CHLORIDE PRN MLS/HR: 4.5 INJECTION, SOLUTION INTRAVENOUS at 13:39

## 2017-12-31 RX ADMIN — THERA TABS SCH UDTAB: TAB at 09:08

## 2017-12-31 RX ADMIN — OXYCODONE HYDROCHLORIDE AND ACETAMINOPHEN SCH MG: 500 TABLET ORAL at 09:09

## 2017-12-31 RX ADMIN — Medication SCH MG: at 07:21

## 2017-12-31 RX ADMIN — Medication SCH MG: at 09:08

## 2017-12-31 NOTE — NUR
Pt received on mechanical vent, tolerating current vent settings well.  Pt trach is secure.  
Vent is plugged into a red outlet, alarms are set and audible, and BVM is at bedside.

-------------------------------------------------------------------------------

Addendum: 12/31/17 at 1718 by HUNTER TIWARI RT

-------------------------------------------------------------------------------

Amended: Links added.

## 2017-12-31 NOTE — NUR
TELE RN NOTE 

 SPOKE WITH SOULEYMANE CELAYA RN NP NOTIFIED THAT /90 WITH ORDER HYDRALAZINE 25 NG VIA GTUBE 
TIME ONE ,ORDER CARRIED OUT

## 2017-12-31 NOTE — NUR
RN INITIAL NOTES



Pt received on mechanical vent, tolerating current vent settings well.  Pt trach is secure.  
Vent is plugged into a red outlet, alarms are set and audible, and BVM is at bedside.Will 
continue to monitor pt.

## 2017-12-31 NOTE — NUR
TELE RN NOTE

  PER SOULEYMANE CELAYA OK TO GIVE IVF AT 80 ML PER HOUR, AWARE THAT BOTH HANDS WITH SEVERE EDEMA 
STATED OK TO  CONT TO GIVE, WILL F]U

## 2017-12-31 NOTE — NUR
RT



PT RECEIVED ON [facility] VENT WITH NOTED SETTING. VENT PLUGGED IN TO RED OUTLET. ALARMS SET AND 
AUDIBLE. AMBU BAG AT [facility]. NO SOB OR RESP DISTRESS NOTED ON SHIFT. HHN TX GIVEN AS ORDERED 
WITH NO ADVERSE REACTIONS.

-------------------------------------------------------------------------------

Addendum: 12/31/17 at 0558 by ERNST AMOS RT

-------------------------------------------------------------------------------

Amended: Links added.

## 2017-12-31 NOTE — NUR
TELE RN NOTE 

 KEEP CLEAN DRY FAMILY AT BEDSIDE, NOT IN ACUTE DISTRESS, WILL CONT TO MONITOR CLOSELY

## 2018-01-01 VITALS — SYSTOLIC BLOOD PRESSURE: 133 MMHG | DIASTOLIC BLOOD PRESSURE: 55 MMHG

## 2018-01-01 VITALS — DIASTOLIC BLOOD PRESSURE: 66 MMHG | SYSTOLIC BLOOD PRESSURE: 136 MMHG

## 2018-01-01 VITALS — SYSTOLIC BLOOD PRESSURE: 136 MMHG | DIASTOLIC BLOOD PRESSURE: 66 MMHG

## 2018-01-01 VITALS — DIASTOLIC BLOOD PRESSURE: 56 MMHG | SYSTOLIC BLOOD PRESSURE: 139 MMHG

## 2018-01-01 VITALS — SYSTOLIC BLOOD PRESSURE: 139 MMHG | DIASTOLIC BLOOD PRESSURE: 56 MMHG

## 2018-01-01 VITALS — DIASTOLIC BLOOD PRESSURE: 55 MMHG | SYSTOLIC BLOOD PRESSURE: 177 MMHG

## 2018-01-01 VITALS — SYSTOLIC BLOOD PRESSURE: 140 MMHG | DIASTOLIC BLOOD PRESSURE: 82 MMHG

## 2018-01-01 LAB
BASOPHILS # BLD AUTO: 0.1 /CMM (ref 0–0.2)
BASOPHILS NFR BLD AUTO: 1 % (ref 0–2)
BUN SERPL-MCNC: 44 MG/DL (ref 7–18)
CALCIUM SERPL-MCNC: 7.1 MG/DL (ref 8.5–10.1)
CHLORIDE SERPL-SCNC: 118 MMOL/L (ref 98–107)
CO2 SERPL-SCNC: 18 MMOL/L (ref 21–32)
CREAT SERPL-MCNC: 0.7 MG/DL (ref 0.6–1.3)
EOSINOPHIL # BLD AUTO: 0.6 /CMM (ref 0–0.7)
EOSINOPHIL NFR BLD AUTO: 4.8 % (ref 0–6)
GLUCOSE SERPL-MCNC: 83 MG/DL (ref 74–106)
HCT VFR BLD AUTO: 32 % (ref 33–45)
HGB BLD-MCNC: 10.7 G/DL (ref 11.5–14.8)
LYMPHOCYTES NFR BLD AUTO: 1.6 /CMM (ref 0.8–4.8)
LYMPHOCYTES NFR BLD AUTO: 12.2 % (ref 20–44)
MAGNESIUM SERPL-MCNC: 1.6 MG/DL (ref 1.8–2.4)
MCH RBC QN AUTO: 30 PG (ref 26–33)
MCHC RBC AUTO-ENTMCNC: 34 G/DL (ref 31–36)
MCV RBC AUTO: 90 FL (ref 82–100)
MONOCYTES NFR BLD AUTO: 1.1 /CMM (ref 0.1–1.3)
MONOCYTES NFR BLD AUTO: 8.1 % (ref 2–12)
NEUTROPHILS # BLD AUTO: 9.9 /CMM (ref 1.8–8.9)
NEUTROPHILS NFR BLD AUTO: 73.9 % (ref 43–81)
PHOSPHATE SERPL-MCNC: 3.2 MG/DL (ref 2.5–4.9)
PLATELET # BLD AUTO: 480 /CMM (ref 150–450)
POTASSIUM SERPL-SCNC: 3.6 MMOL/L (ref 3.5–5.1)
RBC # BLD AUTO: 3.53 MIL/UL (ref 4–5.2)
RDW COEFFICIENT OF VARIATION: 21 (ref 11.5–15)
SODIUM SERPL-SCNC: 145 MMOL/L (ref 136–145)
WBC NRBC COR # BLD AUTO: 13.3 K/UL (ref 4.3–11)

## 2018-01-01 RX ADMIN — FAMOTIDINE SCH MG: 20 TABLET, FILM COATED ORAL at 17:15

## 2018-01-01 RX ADMIN — SODIUM CHLORIDE PRN MLS/HR: 4.5 INJECTION, SOLUTION INTRAVENOUS at 04:19

## 2018-01-01 RX ADMIN — Medication SCH MG: at 07:26

## 2018-01-01 RX ADMIN — FERROUS SULFATE TAB 325 MG (65 MG ELEMENTAL FE) SCH MG: 325 (65 FE) TAB at 09:53

## 2018-01-01 RX ADMIN — Medication SCH EACH: at 09:53

## 2018-01-01 RX ADMIN — DEXTROSE MONOHYDRATE SCH MLS/HR: 50 INJECTION, SOLUTION INTRAVENOUS at 19:48

## 2018-01-01 RX ADMIN — ALBUTEROL SULFATE SCH MG: 2.5 SOLUTION RESPIRATORY (INHALATION) at 13:31

## 2018-01-01 RX ADMIN — Medication SCH MG: at 02:17

## 2018-01-01 RX ADMIN — THERA TABS SCH UDTAB: TAB at 09:53

## 2018-01-01 RX ADMIN — DEXTROSE MONOHYDRATE SCH MLS/HR: 50 INJECTION, SOLUTION INTRAVENOUS at 12:20

## 2018-01-01 RX ADMIN — ALBUTEROL SULFATE SCH MG: 2.5 SOLUTION RESPIRATORY (INHALATION) at 02:17

## 2018-01-01 RX ADMIN — Medication SCH MG: at 09:53

## 2018-01-01 RX ADMIN — ALBUTEROL SULFATE SCH MG: 2.5 SOLUTION RESPIRATORY (INHALATION) at 19:06

## 2018-01-01 RX ADMIN — DEXTROSE MONOHYDRATE SCH MLS/HR: 50 INJECTION, SOLUTION INTRAVENOUS at 04:22

## 2018-01-01 RX ADMIN — MAGNESIUM SULFATE IN DEXTROSE SCH MLS/HR: 10 INJECTION, SOLUTION INTRAVENOUS at 14:02

## 2018-01-01 RX ADMIN — Medication SCH EACH: at 17:15

## 2018-01-01 RX ADMIN — Medication SCH GM: at 09:54

## 2018-01-01 RX ADMIN — OXYCODONE HYDROCHLORIDE AND ACETAMINOPHEN SCH MG: 500 TABLET ORAL at 09:53

## 2018-01-01 RX ADMIN — OXYCODONE HYDROCHLORIDE AND ACETAMINOPHEN SCH MG: 500 TABLET ORAL at 17:15

## 2018-01-01 RX ADMIN — ALBUTEROL SULFATE SCH MG: 2.5 SOLUTION RESPIRATORY (INHALATION) at 07:26

## 2018-01-01 RX ADMIN — FAMOTIDINE SCH MG: 20 TABLET, FILM COATED ORAL at 09:53

## 2018-01-01 RX ADMIN — Medication SCH MG: at 19:06

## 2018-01-01 RX ADMIN — MAGNESIUM SULFATE IN DEXTROSE SCH MLS/HR: 10 INJECTION, SOLUTION INTRAVENOUS at 12:57

## 2018-01-01 RX ADMIN — SODIUM CHLORIDE PRN MLS/HR: 4.5 INJECTION, SOLUTION INTRAVENOUS at 19:54

## 2018-01-01 RX ADMIN — Medication SCH MG: at 13:31

## 2018-01-01 NOTE — NUR
RT NOTE:



PT RECEIVED ON MECH VENT WITH NOTED SETTING. MLT DONE. VENT PLUGGED INTO RED OUTLET. ALARMS 
SET AND AUDIBLE. AMBU BAG AT HOB. NO SOB OR RESP DISTRESS NOTED ON SHIFT. PRN SX GIVEN AS 
NEEDED. Q6 HHN TX GIVEN AS ORDERED WITH NO ADVERSE REACTIONS. WILL CONT TO MONITOR PT.

## 2018-01-01 NOTE — NUR
RT



PT RECEIVED ON [facility] VENT WITH NOTED SETTING. VENT PLUGGED IN TO RED OUTLET. ALARMS SET AND 
AUDIBLE. AMBU BAG AT [facility]. NO SOB OR RESP DISTRESS NOTED ON SHIFT. HHN TX GIVEN AS ORDERED 
WITH NO ADVERSE REACTIONS.

---------------------------------------------------

-------------------------------------------------------------------------------

Addendum: 01/01/18 at 0530 by ERNST AMOS RT

-------------------------------------------------------------------------------

Amended: Links added.

## 2018-01-01 NOTE — NUR
RN CLOSING NOTES



NO CHANGES IN PT CONDITION OVER NIGHT. Pt received on mechanical vent, tolerating current 
vent settings well.  Pt trach is secure.  Vent is plugged into a red outlet, alarms are set 
and audible, and BVM is at bedside.Will endorse to Am RN.

## 2018-01-01 NOTE — NUR
RN CLOSING NOTES



PT IS STABLE AND RESTING IN BED. NO S/S OF RESP DISTRESS OR SOB. PT DOES NOT APPEAR TO BE IN 
PAIN. VENT SETTINGS IN PLACE. PER MD ORDER, PT IS NPO, TUBE FEEDING STOPPED AT 1750.  ALL PT 
NEEDS ANTICIPATED AND MET. SAFETY MEASURES IN PLACE, CALL LIGHT WITHIN REACH. WILL ENDORSE 
TO NIGHT SHIFT FOR MAISHA.

## 2018-01-01 NOTE — NUR
RN OPENING NOTES



RECEIVED PT. PT IS STABLE AND RESTING, PT IS OBTUNDED. PT IS ON TELE MONITOR, SR. PT IS 
TRACHED WITH SHILEY #6 AND VENT DEPENDENT, SETTINGS IN PLACE. FC IN PLACE AND PATENT. IV 
ACCESS LOCATED ON JAZ (SL) AND DELMA (0.45 NS AT 80 ML/HR) BOTH MIDLINES. NGT SET TO FEED AT 
40 ML/HR.  SAFETY MEASURES IN PLACE, BED LOWERED TO LOWEST POSITION, HAND RAILS RAISE X3. 
WILL CONTINUE TO MONITOR.

## 2018-01-01 NOTE — NUR
RN AM NOTES



RECEIVED PATIENT IN STABLE CONDITION. OBTUNDED, ON SHILEY 6 MECHANICAL VENT DEPENDENT 
SETTING AS ORDERED. NOT IN ANY DISTRESS. IN NO APPARENT DISTRESS. TELEMETRY READS SR HR 86, 
NO SIGNS OF CHEST DISCOMFORT OR PAIN, JAZ MIDLINE FLUSHES WELL, SITE CLEAR, DELMA MIDLINE WITH 
1/2 NS AT 80 ML/HR RUNNING, SITE CLEAR, ALAMO CATH IS IN PLACE, DRAINING ADEQUATE AMOUNT OF 
URINE, YELLOW CLEAR, GENERALIZED SWELLING, CONTRACTED, BEDREST, NGT CHECKED FOR PLACEMENT 
WITH COLLEAGUE ANDREE MADRIGAL, NUTREN 40 ML/HR, 10 ML RESIDUAL, SEE NURSING FLOWSHEET FOR SKIN 
ISSUES. BEDSIDE RAILS ARE UP X2. BED IS LOCKED AND LOWERED. WILL TURN AND REPOSITION Q 2 
HOURS. WILL CONTINUE TO MONITOR.

## 2018-01-01 NOTE — NUR
Female trach pt received on AC mode.  Pt trach is secure. Vent is plugged into a red outlet, 
alarms are set and audible, and BVM is at bedside.

-------------------------------------------------------------------------------

Addendum: 01/01/18 at 1726 by HUNTER TIWARI RT

-------------------------------------------------------------------------------

Amended: Links added.

## 2018-01-01 NOTE — NUR
RT INITIAL NOTE:



[facility] WITH NOT[facility] SET[facility]. MLT DONE. VENT PLUGGED INTO RED OUTLET. ALARMS 
SET AND AUDIBLE. AMBU BAG AT HOB. NO SOB OR RESP DISTRESS NOTED. WILL CONT TO MONITOR PT.

## 2018-01-02 VITALS — DIASTOLIC BLOOD PRESSURE: 59 MMHG | SYSTOLIC BLOOD PRESSURE: 150 MMHG

## 2018-01-02 VITALS — DIASTOLIC BLOOD PRESSURE: 71 MMHG | SYSTOLIC BLOOD PRESSURE: 140 MMHG

## 2018-01-02 VITALS — DIASTOLIC BLOOD PRESSURE: 52 MMHG | SYSTOLIC BLOOD PRESSURE: 122 MMHG

## 2018-01-02 VITALS — SYSTOLIC BLOOD PRESSURE: 133 MMHG | DIASTOLIC BLOOD PRESSURE: 40 MMHG

## 2018-01-02 VITALS — SYSTOLIC BLOOD PRESSURE: 133 MMHG | DIASTOLIC BLOOD PRESSURE: 56 MMHG

## 2018-01-02 VITALS — SYSTOLIC BLOOD PRESSURE: 115 MMHG | DIASTOLIC BLOOD PRESSURE: 78 MMHG

## 2018-01-02 VITALS — DIASTOLIC BLOOD PRESSURE: 66 MMHG | SYSTOLIC BLOOD PRESSURE: 145 MMHG

## 2018-01-02 VITALS — SYSTOLIC BLOOD PRESSURE: 150 MMHG | DIASTOLIC BLOOD PRESSURE: 59 MMHG

## 2018-01-02 LAB
BUN SERPL-MCNC: 43 MG/DL (ref 7–18)
CALCIUM SERPL-MCNC: 7.3 MG/DL (ref 8.5–10.1)
CHLORIDE SERPL-SCNC: 116 MMOL/L (ref 98–107)
CO2 SERPL-SCNC: 18 MMOL/L (ref 21–32)
CREAT SERPL-MCNC: 0.7 MG/DL (ref 0.6–1.3)
GLUCOSE SERPL-MCNC: 88 MG/DL (ref 74–106)
MAGNESIUM SERPL-MCNC: 2 MG/DL (ref 1.8–2.4)
POTASSIUM SERPL-SCNC: 3.8 MMOL/L (ref 3.5–5.1)
SODIUM SERPL-SCNC: 144 MMOL/L (ref 136–145)

## 2018-01-02 RX ADMIN — Medication SCH EACH: at 08:31

## 2018-01-02 RX ADMIN — ALBUTEROL SULFATE SCH MG: 2.5 SOLUTION RESPIRATORY (INHALATION) at 00:58

## 2018-01-02 RX ADMIN — DEXTROSE MONOHYDRATE SCH MLS/HR: 50 INJECTION, SOLUTION INTRAVENOUS at 20:53

## 2018-01-02 RX ADMIN — FAMOTIDINE SCH MG: 20 TABLET, FILM COATED ORAL at 08:31

## 2018-01-02 RX ADMIN — SODIUM CHLORIDE PRN MLS/HR: 4.5 INJECTION, SOLUTION INTRAVENOUS at 21:01

## 2018-01-02 RX ADMIN — ALBUTEROL SULFATE SCH MG: 2.5 SOLUTION RESPIRATORY (INHALATION) at 13:24

## 2018-01-02 RX ADMIN — Medication SCH MG: at 08:31

## 2018-01-02 RX ADMIN — OXYCODONE HYDROCHLORIDE AND ACETAMINOPHEN SCH MG: 500 TABLET ORAL at 08:31

## 2018-01-02 RX ADMIN — DEXTROSE MONOHYDRATE SCH MLS/HR: 50 INJECTION, SOLUTION INTRAVENOUS at 04:52

## 2018-01-02 RX ADMIN — Medication SCH MG: at 07:36

## 2018-01-02 RX ADMIN — Medication SCH MG: at 00:58

## 2018-01-02 RX ADMIN — Medication SCH GM: at 08:31

## 2018-01-02 RX ADMIN — DEXTROSE MONOHYDRATE SCH MLS/HR: 50 INJECTION, SOLUTION INTRAVENOUS at 12:42

## 2018-01-02 RX ADMIN — THERA TABS SCH UDTAB: TAB at 08:31

## 2018-01-02 RX ADMIN — Medication PRN ML: at 16:55

## 2018-01-02 RX ADMIN — Medication SCH MG: at 13:24

## 2018-01-02 RX ADMIN — OXYCODONE HYDROCHLORIDE AND ACETAMINOPHEN SCH MG: 500 TABLET ORAL at 16:37

## 2018-01-02 RX ADMIN — FAMOTIDINE SCH MG: 20 TABLET, FILM COATED ORAL at 16:37

## 2018-01-02 RX ADMIN — ALBUTEROL SULFATE SCH MG: 2.5 SOLUTION RESPIRATORY (INHALATION) at 07:36

## 2018-01-02 RX ADMIN — Medication SCH MG: at 19:49

## 2018-01-02 RX ADMIN — FERROUS SULFATE TAB 325 MG (65 MG ELEMENTAL FE) SCH MG: 325 (65 FE) TAB at 08:31

## 2018-01-02 RX ADMIN — ALBUTEROL SULFATE SCH MG: 2.5 SOLUTION RESPIRATORY (INHALATION) at 19:49

## 2018-01-02 RX ADMIN — Medication SCH EACH: at 16:37

## 2018-01-02 NOTE — NUR
RN CLOSING NOTES



PT IN BED RESTING. NO S/S OF SOB. PT DOES NOT APPEAR TO BE IN ANY PAIN AT THIS TIME. FAMILY 
REQUESTS TO SPEAK WITH SURGICAL MD, DR. HESTER, PRIOR TO SIGNING EGD WITH PEG PLACEMENT 
CONSENT FORM. NPO CANCELLED, PER MD ORDER TUBE FEEDING RESUMED. ALL PT NEEDS ANTICIPATED AND 
MET. SAFETY MEASURES IN PLACE, CALL LIGHT WITHIN REACH. WILL ENDORSE TO NIGHT SHIFT FOR MAISHA.

## 2018-01-02 NOTE — NUR
RN INITIAL NOTES



PT IN BED RESTING. NO S/S OF SOB. PT DOES NOT APPEAR TO BE IN ANY PAIN AT THIS TIME. TUBE 
FEEDING, ALL SAFETY MEASURES IN PLACE, CALL LIGHT WITHIN REACH. WILL CONTINUE TO MONITOR PT.

## 2018-01-02 NOTE — NUR
RT CLOSING  NOTE:



NO CHANGE IN PT CONDITION OVERNIGHT. PT RECEIVED ON [facility] VENT WITH NOTED SETTING. MLT DONE. 
VENT PLUGGED INTO RED OUTLET. ALARMS SET AND AUDIBLE. AMBU BAG AT HOB. NO SOB OR RESP 
DISTRESS NOTED. WILL CONT TO MONITOR PT.PT KEPT NPO SINCE MIDNIGHT. ALL MEDIATION GIVEN  AND 
NEEDS ATTENDED.WILL ENDORSE TO AM RN.

## 2018-01-02 NOTE — NUR
RECEIVED PT ON AC MODE ALARMS ON AND AUDIBLE, VENT PLUGGED INTO RED OUTLET, AMBUBAG AT 
BEDSIDE, SXD, MODERATE AMOUNT OF THICK PALE YELLOW SECRETIONS, TXS GIVEN AS ORDERED NO 
ADVERSE REACTION NOTED, PT STABLE ON CURRENT VENT SETTINGS, 

-------------------------------------------------------------------------------

Addendum: 01/02/18 at 1746 by JESUS MANUEL BALDERAS RT

-------------------------------------------------------------------------------

Amended: Links added.

## 2018-01-02 NOTE — NUR
RN OPENING NOTES



RECEIVED PT. PT STABLE AND RESTING IN BED. PT IS OBTUNDED.  NO S/S OF RESPIRATORY DISTRESS. 
PT IS TRACHED AND VENT DEPENDENT, SETTINGS IN PLACE. FC PATENT AND IN PLACE. IV ACCESS 
LOCATED ON JAZ (MIDLINE) AND DELMA (MIDLINE). DELMA LINE IS INFUSING 0.45 NS AT 80 ML/HR. PT IS 
ON NPO SINCE MIDNIGHT. POSSIBLE PEG PLACEMENT TO OCCUR TODAY 1/2/183 D5W AT 75 ML/HR. SAFETY 
MEASURES IN PLACE, BED LOWERED TO LOWEST POSITION, SIDE RAILS RAISED X2. WILL CONTINUE TO 
MONITOR.

-------------------------------------------------------------------------------

Addendum: 01/02/18 at 1318 by STEFANI KOENIG

-------------------------------------------------------------------------------

PLEASE DISREGARD TYPING MISTAKE. NURSING NOTE SHOULD READ AS FOLLOWS: POSSIBLE PEG PLACEMENT 
TO OCCUR TODAY 1/2/18. SAFETY MEASURES IN PLACE, BED LOWERED TO LOWEST POSITION, SIDE RAILS 
RAISED X2. WILL CONTINUE TO MONITOR.

## 2018-01-03 VITALS — SYSTOLIC BLOOD PRESSURE: 157 MMHG | DIASTOLIC BLOOD PRESSURE: 71 MMHG

## 2018-01-03 VITALS — DIASTOLIC BLOOD PRESSURE: 84 MMHG | SYSTOLIC BLOOD PRESSURE: 127 MMHG

## 2018-01-03 VITALS — SYSTOLIC BLOOD PRESSURE: 200 MMHG | DIASTOLIC BLOOD PRESSURE: 56 MMHG

## 2018-01-03 VITALS — DIASTOLIC BLOOD PRESSURE: 71 MMHG | SYSTOLIC BLOOD PRESSURE: 123 MMHG

## 2018-01-03 VITALS — SYSTOLIC BLOOD PRESSURE: 123 MMHG | DIASTOLIC BLOOD PRESSURE: 71 MMHG

## 2018-01-03 VITALS — DIASTOLIC BLOOD PRESSURE: 46 MMHG | SYSTOLIC BLOOD PRESSURE: 119 MMHG

## 2018-01-03 VITALS — DIASTOLIC BLOOD PRESSURE: 65 MMHG | SYSTOLIC BLOOD PRESSURE: 127 MMHG

## 2018-01-03 VITALS — DIASTOLIC BLOOD PRESSURE: 71 MMHG | SYSTOLIC BLOOD PRESSURE: 136 MMHG

## 2018-01-03 RX ADMIN — Medication SCH EACH: at 09:53

## 2018-01-03 RX ADMIN — Medication SCH MG: at 19:50

## 2018-01-03 RX ADMIN — ALBUTEROL SULFATE SCH MG: 2.5 SOLUTION RESPIRATORY (INHALATION) at 01:58

## 2018-01-03 RX ADMIN — Medication SCH MG: at 07:32

## 2018-01-03 RX ADMIN — ALBUTEROL SULFATE SCH MG: 2.5 SOLUTION RESPIRATORY (INHALATION) at 07:32

## 2018-01-03 RX ADMIN — DEXTROSE MONOHYDRATE SCH MLS/HR: 50 INJECTION, SOLUTION INTRAVENOUS at 12:39

## 2018-01-03 RX ADMIN — OXYCODONE HYDROCHLORIDE AND ACETAMINOPHEN SCH MG: 500 TABLET ORAL at 17:15

## 2018-01-03 RX ADMIN — ERGOCALCIFEROL SCH UNIT: 1.25 CAPSULE ORAL at 10:46

## 2018-01-03 RX ADMIN — FAMOTIDINE SCH MG: 20 TABLET, FILM COATED ORAL at 09:53

## 2018-01-03 RX ADMIN — THERA TABS SCH UDTAB: TAB at 09:53

## 2018-01-03 RX ADMIN — Medication PRN ML: at 04:53

## 2018-01-03 RX ADMIN — FERROUS SULFATE TAB 325 MG (65 MG ELEMENTAL FE) SCH MG: 325 (65 FE) TAB at 09:53

## 2018-01-03 RX ADMIN — OXYCODONE HYDROCHLORIDE AND ACETAMINOPHEN SCH MG: 500 TABLET ORAL at 09:53

## 2018-01-03 RX ADMIN — FAMOTIDINE SCH MG: 20 TABLET, FILM COATED ORAL at 17:16

## 2018-01-03 RX ADMIN — SODIUM CHLORIDE PRN MLS/HR: 4.5 INJECTION, SOLUTION INTRAVENOUS at 12:37

## 2018-01-03 RX ADMIN — Medication SCH GM: at 17:12

## 2018-01-03 RX ADMIN — Medication SCH MG: at 13:31

## 2018-01-03 RX ADMIN — ALBUTEROL SULFATE SCH MG: 2.5 SOLUTION RESPIRATORY (INHALATION) at 13:31

## 2018-01-03 RX ADMIN — Medication SCH MG: at 01:58

## 2018-01-03 RX ADMIN — DEXTROSE MONOHYDRATE SCH MLS/HR: 50 INJECTION, SOLUTION INTRAVENOUS at 20:39

## 2018-01-03 RX ADMIN — Medication SCH MG: at 09:53

## 2018-01-03 RX ADMIN — Medication SCH EACH: at 17:15

## 2018-01-03 RX ADMIN — ALBUTEROL SULFATE SCH MG: 2.5 SOLUTION RESPIRATORY (INHALATION) at 19:50

## 2018-01-03 RX ADMIN — DEXTROSE MONOHYDRATE SCH MLS/HR: 50 INJECTION, SOLUTION INTRAVENOUS at 04:55

## 2018-01-03 NOTE — NUR
RECEIVED PT ON ac MODE ON LTV VENT, PLUGGED INTO RED OUTLET, ABMUBAG AT BEDSIDE, TRACH 
PATENT AND SECURE, TXS GIVEN AS ORDERED NO ADVERSE REACTION NOTED PT STABLE.

-------------------------------------------------------------------------------

Addendum: 01/03/18 at 1732 by JESUS MANUEL BALDERAS RT

-------------------------------------------------------------------------------

Amended: Links added.

## 2018-01-03 NOTE — NUR
TELE RN OPENING NOTES

RECEIVED REPORT FROM AM RN. PATIENT OBTUNDED. TRACH INTACT W/ VENT SETTINGS AC 12, , 
FIO2 35%, PEEP 5, TOLERATING WELL. ON TELE SINUS RHYTHM IN THE 90S. RIGHT UPPER ARM MIDLINE 
INTACT & PATENT W/ DRESSING CDI & IVF 1/2 NS @ 80 ML/HR. LEFT UPPER ARM MIDLINE INTACT & 
PATENT W/ DRESSING CDI, SLAINE LOCKED. NG-TUBE IN LEFT NOSTRIL INTACT W/ NGTF NUTREN @ 40 
ML/HR. NO RESIDUAL NOTED @ THIS TIME. ALAMO CATH DRAINING YELLOW URINE, NO LEAKING NOTED. NO 
S/S OF PAIN OR DISCOMFORT @ THIS TIME. SAFETY MEASURES IN PLACE W/ SIDE RAILS UP, BED LOCKED 
& IN LOWEST POSITION & BED ALARM ON. WILL CONTINUE TO MONITOR.

## 2018-01-03 NOTE — NUR
Nurse Notes:

    Patient is suppose to have tomorrow EGD, with possible biopsy, 
cauterization,photography, and cytology. Percutaneous Endoscopic Gastrostomy Tube Insertion. 
 Daughter Stephany was called at [phone]. daughter does not want to [id number] for the GT 
placement, until she speaks to her family.  Nurse Practicioner AMY Benavidez was called, she needs 
to call back. to hold procedure of EGD and GT placement.

## 2018-01-03 NOTE — NUR
RN CLOSING NOTES



PT IN BED RESTING. NO S/S OF SOB. PT DOES NOT APPEAR TO BE IN ANY PAIN AT THIS TIME. TUBE 
FEEDING @40,IV FLUIDS @80, ALL SAFETY MEASURES IN PLACE, CALL LIGHT WITHIN REACH. WILL 
ENDORSE TO AM RN.

## 2018-01-04 VITALS — SYSTOLIC BLOOD PRESSURE: 131 MMHG | DIASTOLIC BLOOD PRESSURE: 57 MMHG

## 2018-01-04 VITALS — DIASTOLIC BLOOD PRESSURE: 45 MMHG | SYSTOLIC BLOOD PRESSURE: 126 MMHG

## 2018-01-04 VITALS — DIASTOLIC BLOOD PRESSURE: 30 MMHG | SYSTOLIC BLOOD PRESSURE: 155 MMHG

## 2018-01-04 VITALS — SYSTOLIC BLOOD PRESSURE: 152 MMHG | DIASTOLIC BLOOD PRESSURE: 52 MMHG

## 2018-01-04 VITALS — DIASTOLIC BLOOD PRESSURE: 38 MMHG | SYSTOLIC BLOOD PRESSURE: 177 MMHG

## 2018-01-04 VITALS — SYSTOLIC BLOOD PRESSURE: 134 MMHG | DIASTOLIC BLOOD PRESSURE: 40 MMHG

## 2018-01-04 VITALS — DIASTOLIC BLOOD PRESSURE: 41 MMHG | SYSTOLIC BLOOD PRESSURE: 119 MMHG

## 2018-01-04 RX ADMIN — Medication SCH EACH: at 16:25

## 2018-01-04 RX ADMIN — FERROUS SULFATE TAB 325 MG (65 MG ELEMENTAL FE) SCH MG: 325 (65 FE) TAB at 09:19

## 2018-01-04 RX ADMIN — ALBUTEROL SULFATE SCH MG: 2.5 SOLUTION RESPIRATORY (INHALATION) at 14:19

## 2018-01-04 RX ADMIN — Medication SCH MG: at 08:13

## 2018-01-04 RX ADMIN — Medication SCH MG: at 01:28

## 2018-01-04 RX ADMIN — DEXTROSE MONOHYDRATE SCH MLS/HR: 50 INJECTION, SOLUTION INTRAVENOUS at 16:18

## 2018-01-04 RX ADMIN — Medication SCH GM: at 09:20

## 2018-01-04 RX ADMIN — ALBUTEROL SULFATE SCH MG: 2.5 SOLUTION RESPIRATORY (INHALATION) at 08:13

## 2018-01-04 RX ADMIN — Medication PRN EACH: at 04:16

## 2018-01-04 RX ADMIN — Medication SCH EACH: at 09:19

## 2018-01-04 RX ADMIN — ALBUTEROL SULFATE SCH MG: 2.5 SOLUTION RESPIRATORY (INHALATION) at 01:28

## 2018-01-04 RX ADMIN — Medication SCH MG: at 19:48

## 2018-01-04 RX ADMIN — OXYCODONE HYDROCHLORIDE AND ACETAMINOPHEN SCH MG: 500 TABLET ORAL at 16:25

## 2018-01-04 RX ADMIN — FAMOTIDINE SCH MG: 20 TABLET, FILM COATED ORAL at 16:25

## 2018-01-04 RX ADMIN — OXYCODONE HYDROCHLORIDE AND ACETAMINOPHEN SCH MG: 500 TABLET ORAL at 09:19

## 2018-01-04 RX ADMIN — DEXTROSE MONOHYDRATE SCH MLS/HR: 50 INJECTION, SOLUTION INTRAVENOUS at 04:16

## 2018-01-04 RX ADMIN — FAMOTIDINE SCH MG: 20 TABLET, FILM COATED ORAL at 09:19

## 2018-01-04 RX ADMIN — Medication PRN EACH: at 09:19

## 2018-01-04 RX ADMIN — Medication SCH MG: at 14:19

## 2018-01-04 RX ADMIN — ALBUTEROL SULFATE SCH MG: 2.5 SOLUTION RESPIRATORY (INHALATION) at 19:48

## 2018-01-04 RX ADMIN — THERA TABS SCH UDTAB: TAB at 09:19

## 2018-01-04 RX ADMIN — Medication SCH MG: at 09:19

## 2018-01-04 NOTE — NUR
TELE RN INITIAL NOTE



PT RECEIVED RESTING IN BED. OBTUNDED. ON MECH VENT WITH SETTINGS WELL TOLERATED AND 
SATURATING WELL. TELE-SR 88. NGTUBE IN L NARE IN PLACE AND PATENT WITH FEEDING INFUSING. NO 
RESIDUALS NOTED. HOB ELEVATED. ON ASPIRATION PRECAUTIONS. IV DELMA & JAZ MIDLINE IN PLACE, 
CLEAN, INTACT AND FLUSHING WELL. ALAMO CATHETER IN PLACE AND DRAINING BY GRAVITY. CALL LIGHT 
WITHIN REACH. WILL CONTINUE TO MONITOR.

## 2018-01-04 NOTE — NUR
RT



RECEIVED PT ON LTV VENT, PLUGGED INTO RED OUTLET, AMBUBAG AT BEDSIDE, TRACH PATENT AND 
SECURED, TXS GIVEN AS ORDERED NO ADVERSE REACTION NOTED, PT STABLE AT THIS TIME

-------------------------------------------------------------------------------

Addendum: 01/04/18 at 1619 by JESUS MANUEL BALDERAS RT

-------------------------------------------------------------------------------

Amended: Links added.

## 2018-01-04 NOTE — NUR
RN INITIAL NOTE

PATIENT RECEIVED IN BED RESTING COMFORTABLY. PATIENT IS OBTUNDED, NON-VERBAL.   NO S/S OF 
PAIN OR DISCOMFORT. SINUS RHYTHM ON TELE MONITOR.  PT HAS EMILY JONES #6 .  NO S/S OF 
RESPIRATORY DISTRESS OR SOB. TOLERATING VENT SETTINGS WELL. SKIN IS WARM AND DRY TO TOUCH.  
IV SITE FLUSHED, AND PATENT.  ALAMO CATHETER DRAINING TO GRAVITY.  NGT FLUSHED, PATENT, 
PLACEMENT VERIFED. SAFETY PRECAUTIONS IMPLEMENTED.  BED IN LOCKED, LOW POSITION WITH TWO 
SIDE RAILS UP.  WILL CONTINUE TO MONITOR CLOSELY

## 2018-01-04 NOTE — NUR
RT

pt remains on [facility] vent on ordered settings. trach secure and patent.

-------------------------------------------------------------------------------

Addendum: 01/04/18 at 0545 by ALDO BARRON RT

-------------------------------------------------------------------------------

Amended: Links added.

## 2018-01-05 VITALS — DIASTOLIC BLOOD PRESSURE: 56 MMHG | SYSTOLIC BLOOD PRESSURE: 133 MMHG

## 2018-01-05 VITALS — DIASTOLIC BLOOD PRESSURE: 59 MMHG | SYSTOLIC BLOOD PRESSURE: 148 MMHG

## 2018-01-05 VITALS — SYSTOLIC BLOOD PRESSURE: 123 MMHG | DIASTOLIC BLOOD PRESSURE: 57 MMHG

## 2018-01-05 VITALS — SYSTOLIC BLOOD PRESSURE: 148 MMHG | DIASTOLIC BLOOD PRESSURE: 57 MMHG

## 2018-01-05 VITALS — SYSTOLIC BLOOD PRESSURE: 122 MMHG | DIASTOLIC BLOOD PRESSURE: 56 MMHG

## 2018-01-05 RX ADMIN — Medication SCH GM: at 09:52

## 2018-01-05 RX ADMIN — FAMOTIDINE SCH MG: 20 TABLET, FILM COATED ORAL at 16:15

## 2018-01-05 RX ADMIN — OXYCODONE HYDROCHLORIDE AND ACETAMINOPHEN SCH MG: 500 TABLET ORAL at 09:50

## 2018-01-05 RX ADMIN — ALBUTEROL SULFATE SCH MG: 2.5 SOLUTION RESPIRATORY (INHALATION) at 13:00

## 2018-01-05 RX ADMIN — FAMOTIDINE SCH MG: 20 TABLET, FILM COATED ORAL at 09:50

## 2018-01-05 RX ADMIN — ALBUTEROL SULFATE SCH MG: 2.5 SOLUTION RESPIRATORY (INHALATION) at 07:15

## 2018-01-05 RX ADMIN — OXYCODONE HYDROCHLORIDE AND ACETAMINOPHEN SCH MG: 500 TABLET ORAL at 16:15

## 2018-01-05 RX ADMIN — Medication SCH MG: at 20:12

## 2018-01-05 RX ADMIN — Medication SCH MG: at 00:45

## 2018-01-05 RX ADMIN — ALBUTEROL SULFATE SCH MG: 2.5 SOLUTION RESPIRATORY (INHALATION) at 20:12

## 2018-01-05 RX ADMIN — ALBUTEROL SULFATE SCH MG: 2.5 SOLUTION RESPIRATORY (INHALATION) at 00:45

## 2018-01-05 RX ADMIN — DEXTROSE MONOHYDRATE SCH MLS/HR: 50 INJECTION, SOLUTION INTRAVENOUS at 16:15

## 2018-01-05 RX ADMIN — Medication PRN ML: at 03:00

## 2018-01-05 RX ADMIN — SODIUM CHLORIDE PRN MLS/HR: 4.5 INJECTION, SOLUTION INTRAVENOUS at 20:35

## 2018-01-05 RX ADMIN — Medication SCH MG: at 09:50

## 2018-01-05 RX ADMIN — THERA TABS SCH UDTAB: TAB at 09:51

## 2018-01-05 RX ADMIN — Medication SCH EACH: at 16:15

## 2018-01-05 RX ADMIN — Medication SCH MG: at 13:00

## 2018-01-05 RX ADMIN — Medication SCH EACH: at 09:50

## 2018-01-05 RX ADMIN — SODIUM CHLORIDE PRN MLS/HR: 4.5 INJECTION, SOLUTION INTRAVENOUS at 02:59

## 2018-01-05 RX ADMIN — Medication SCH MG: at 07:14

## 2018-01-05 RX ADMIN — DEXTROSE MONOHYDRATE SCH MLS/HR: 50 INJECTION, SOLUTION INTRAVENOUS at 03:07

## 2018-01-05 RX ADMIN — FERROUS SULFATE TAB 325 MG (65 MG ELEMENTAL FE) SCH MG: 325 (65 FE) TAB at 09:51

## 2018-01-05 NOTE — NUR
RT 

Pt trach remains on vent on ordered settings.

-------------------------------------------------------------------------------

Addendum: 01/05/18 at 0456 by ALDO BARRON RT

-------------------------------------------------------------------------------

Amended: Links added.

## 2018-01-05 NOTE — NUR
TELE RN CLOSING NOTE



PT REMAINED STABLE DURING SHIFT. NO ACUTE DISTRESS NOTED. VENT SETTINGS WLL TOLERATED. HOB 
ELEVATED. ON ASPIRATION PRECAUTIONS. SUCTIONED AS NEEDED. REPOSITIONED Q2H. NGTUBE IN PLACE 
AND FEEDING WELL TOLERATED. IV IN PLACE. ALL SAFETY MEASURES IN PLACE. WILL ENDORSE TO NEXT 
SHIFT FOR CONTINUITY OF CARE.

## 2018-01-05 NOTE — NUR
RT





RECEIVED PT ON AC MODE ON LTV VENT, PLUGGED INTO RED OUTLET,

AMBUBAG AT BEDSIDE, TRACH PATENT AND SECURE NO SOB NOTED THROUGHOUT SHIFT, TXS GIVEN AS 
ORDERED NO ADVERSE REACTION NOTED PT STABLE AT THIS TIME




-------------------------------------------------------------------------------

Addendum: 01/05/18 at 1752 by JESUS MANUEL BALDERAS RT

-------------------------------------------------------------------------------

Amended: Links added.

## 2018-01-05 NOTE — NUR
TELE RN INITIAL NOTE



PT ON MECH VENT WITH SETTINGS WELL TOLERATED AND SATURATING WELL. OBTUNDED WITH DAUGHTER AT 
BEDSIDE. HOB ELEVATED AND ON ASPIRATION PRECAUTIONS. NG TUBE PRESENT IN THE LEFT NARE AND 
SECURED IN PLACE. TUBE FEEDING INFUSING WITHOUT RESIDUALS NOTED. TELE SR 80. IV DELMA MIDLINE 
AND JAZ MIDLINE CLEAN, DRY AND PATENT. ALAMO CATHETER IN PLACE AND DRAINING BY GRAVITY. 
INFORMED DAUGHTER OF DEBRIDEMENT PROCEDURE ORDERED BY DR. RENO. DAUGHTER REFUSED TO SIGN AND 
SAID SHE WANTED MORE INFORMATION FROM DOCTOR. SAID SHE WOULD BE IN THE HOSPITAL IN THE 
MORNING. WILL CONTINUE TO MONITOR.

## 2018-01-06 VITALS — DIASTOLIC BLOOD PRESSURE: 60 MMHG | SYSTOLIC BLOOD PRESSURE: 106 MMHG

## 2018-01-06 VITALS — DIASTOLIC BLOOD PRESSURE: 47 MMHG | SYSTOLIC BLOOD PRESSURE: 135 MMHG

## 2018-01-06 VITALS — DIASTOLIC BLOOD PRESSURE: 41 MMHG | SYSTOLIC BLOOD PRESSURE: 129 MMHG

## 2018-01-06 VITALS — DIASTOLIC BLOOD PRESSURE: 45 MMHG | SYSTOLIC BLOOD PRESSURE: 129 MMHG

## 2018-01-06 VITALS — SYSTOLIC BLOOD PRESSURE: 117 MMHG | DIASTOLIC BLOOD PRESSURE: 79 MMHG

## 2018-01-06 VITALS — DIASTOLIC BLOOD PRESSURE: 70 MMHG | SYSTOLIC BLOOD PRESSURE: 138 MMHG

## 2018-01-06 RX ADMIN — SODIUM CHLORIDE PRN MLS/HR: 4.5 INJECTION, SOLUTION INTRAVENOUS at 10:24

## 2018-01-06 RX ADMIN — Medication SCH MG: at 19:30

## 2018-01-06 RX ADMIN — THERA TABS SCH UDTAB: TAB at 08:27

## 2018-01-06 RX ADMIN — Medication SCH MG: at 08:58

## 2018-01-06 RX ADMIN — Medication SCH MG: at 08:24

## 2018-01-06 RX ADMIN — FERROUS SULFATE TAB 325 MG (65 MG ELEMENTAL FE) SCH MG: 325 (65 FE) TAB at 08:24

## 2018-01-06 RX ADMIN — ALBUTEROL SULFATE SCH MG: 2.5 SOLUTION RESPIRATORY (INHALATION) at 01:35

## 2018-01-06 RX ADMIN — Medication SCH MG: at 13:15

## 2018-01-06 RX ADMIN — Medication PRN ML: at 04:55

## 2018-01-06 RX ADMIN — Medication SCH GM: at 08:25

## 2018-01-06 RX ADMIN — OXYCODONE HYDROCHLORIDE AND ACETAMINOPHEN SCH MG: 500 TABLET ORAL at 17:31

## 2018-01-06 RX ADMIN — ALBUTEROL SULFATE SCH MG: 2.5 SOLUTION RESPIRATORY (INHALATION) at 19:30

## 2018-01-06 RX ADMIN — Medication SCH EACH: at 08:24

## 2018-01-06 RX ADMIN — Medication SCH EACH: at 17:31

## 2018-01-06 RX ADMIN — FAMOTIDINE SCH MG: 20 TABLET, FILM COATED ORAL at 17:31

## 2018-01-06 RX ADMIN — ALBUTEROL SULFATE SCH MG: 2.5 SOLUTION RESPIRATORY (INHALATION) at 13:15

## 2018-01-06 RX ADMIN — FAMOTIDINE SCH MG: 20 TABLET, FILM COATED ORAL at 08:24

## 2018-01-06 RX ADMIN — Medication SCH MG: at 01:35

## 2018-01-06 RX ADMIN — ALBUTEROL SULFATE SCH MG: 2.5 SOLUTION RESPIRATORY (INHALATION) at 08:58

## 2018-01-06 RX ADMIN — OXYCODONE HYDROCHLORIDE AND ACETAMINOPHEN SCH MG: 500 TABLET ORAL at 08:24

## 2018-01-06 NOTE — NUR
RT

PATIENT RECEIVED ON MECHANICAL VENTILATION IN STABLE CONDITION. SUCTION DONE, TRACH SECURED 
AND PATENT. BLOODY-TINGED SECRETIONS NOTED. ALARMS ON AND AUDIBLE. 

-------------------------------------------------------------------------------

Addendum: 01/06/18 at 1318 by RAMYA GALLEGOS RT

-------------------------------------------------------------------------------

Amended: Links added.

## 2018-01-06 NOTE — NUR
TELE RN CLOSING



PT REMAINED STABLE DURING SHIFT. NO ACUTE DISTRESS. ALL NEEDS ATTENDED TO PROMPTLY. KEPT 
CLEAN AND DRY. WILL ENDORSE TO NEXT SHIFT FOR CONTINUITY OF CARE.

## 2018-01-06 NOTE — NUR
RN INITIAL NOTE



PATIENT RECEIVED IN BED RESTING COMFORTABLY. PATIENT IS OBTUNDED, NON-VERBAL.   NO S/S OF 
PAIN OR DISCOMFORT. SINUS RHYTHM ON TELE MONITOR.  PT HAS EMILY JONES #6 .  NO S/S OF 
RESPIRATORY DISTRESS OR SOB. TOLERATING VENT SETTINGS WELL. SKIN IS WARM AND DRY TO TOUCH.  
IV SITE FLUSHED, AND PATENT.  ALAMO CATHETER DRAINING TO GRAVITY.  NGT FLUSHED, PATENT, 
PLACEMENT VERIFIED. SAFETY PRECAUTIONS IMPLEMENTED.  BED IN LOCKED, LOW POSITION WITH TWO 
SIDE RAILS UP.  WILL CONTINUE TO MONITOR CLOSELY

## 2018-01-07 VITALS — SYSTOLIC BLOOD PRESSURE: 119 MMHG | DIASTOLIC BLOOD PRESSURE: 42 MMHG

## 2018-01-07 VITALS — SYSTOLIC BLOOD PRESSURE: 111 MMHG | DIASTOLIC BLOOD PRESSURE: 58 MMHG

## 2018-01-07 VITALS — SYSTOLIC BLOOD PRESSURE: 127 MMHG | DIASTOLIC BLOOD PRESSURE: 57 MMHG

## 2018-01-07 VITALS — DIASTOLIC BLOOD PRESSURE: 51 MMHG | SYSTOLIC BLOOD PRESSURE: 137 MMHG

## 2018-01-07 VITALS — SYSTOLIC BLOOD PRESSURE: 142 MMHG | DIASTOLIC BLOOD PRESSURE: 41 MMHG

## 2018-01-07 VITALS — DIASTOLIC BLOOD PRESSURE: 25 MMHG | SYSTOLIC BLOOD PRESSURE: 140 MMHG

## 2018-01-07 LAB
BASOPHILS # BLD AUTO: 0.1 /CMM (ref 0–0.2)
BASOPHILS NFR BLD AUTO: 0.3 % (ref 0–2)
BUN SERPL-MCNC: 48 MG/DL (ref 7–18)
CALCIUM SERPL-MCNC: 7.2 MG/DL (ref 8.5–10.1)
CHLORIDE SERPL-SCNC: 109 MMOL/L (ref 98–107)
CO2 SERPL-SCNC: 17 MMOL/L (ref 21–32)
CREAT SERPL-MCNC: 0.8 MG/DL (ref 0.6–1.3)
EOSINOPHIL # BLD AUTO: 0.5 /CMM (ref 0–0.7)
EOSINOPHIL NFR BLD AUTO: 2.8 % (ref 0–6)
EOSINOPHIL NFR BLD MANUAL: 2 % (ref 0–4)
GLUCOSE SERPL-MCNC: 108 MG/DL (ref 74–106)
HCT VFR BLD AUTO: 29 % (ref 33–45)
HGB BLD-MCNC: 9.8 G/DL (ref 11.5–14.8)
LYMPHOCYTES NFR BLD AUTO: 1.6 /CMM (ref 0.8–4.8)
LYMPHOCYTES NFR BLD AUTO: 9.6 % (ref 20–44)
LYMPHOCYTES NFR BLD MANUAL: 5 % (ref 16–48)
MCH RBC QN AUTO: 32 PG (ref 26–33)
MCHC RBC AUTO-ENTMCNC: 34 G/DL (ref 31–36)
MCV RBC AUTO: 93 FL (ref 82–100)
MONOCYTES NFR BLD AUTO: 1.7 /CMM (ref 0.1–1.3)
MONOCYTES NFR BLD AUTO: 10.3 % (ref 2–12)
MONOCYTES NFR BLD MANUAL: 15 % (ref 0–11)
NEUTROPHILS # BLD AUTO: 12.7 /CMM (ref 1.8–8.9)
NEUTROPHILS NFR BLD AUTO: 77 % (ref 43–81)
NEUTS SEG NFR BLD MANUAL: 78 % (ref 42–76)
PLATELET # BLD AUTO: 374 /CMM (ref 150–450)
POTASSIUM SERPL-SCNC: 3.8 MMOL/L (ref 3.5–5.1)
RBC # BLD AUTO: 3.1 MIL/UL (ref 4–5.2)
RDW COEFFICIENT OF VARIATION: 22.3 (ref 11.5–15)
SODIUM SERPL-SCNC: 138 MMOL/L (ref 136–145)
WBC NRBC COR # BLD AUTO: 16.5 K/UL (ref 4.3–11)

## 2018-01-07 RX ADMIN — FERROUS SULFATE TAB 325 MG (65 MG ELEMENTAL FE) SCH MG: 325 (65 FE) TAB at 08:29

## 2018-01-07 RX ADMIN — Medication SCH MG: at 07:55

## 2018-01-07 RX ADMIN — Medication PRN EACH: at 14:22

## 2018-01-07 RX ADMIN — Medication SCH MG: at 08:28

## 2018-01-07 RX ADMIN — ALBUTEROL SULFATE SCH MG: 2.5 SOLUTION RESPIRATORY (INHALATION) at 07:55

## 2018-01-07 RX ADMIN — Medication SCH GM: at 08:29

## 2018-01-07 RX ADMIN — ALBUTEROL SULFATE SCH MG: 2.5 SOLUTION RESPIRATORY (INHALATION) at 19:51

## 2018-01-07 RX ADMIN — ALBUTEROL SULFATE SCH MG: 2.5 SOLUTION RESPIRATORY (INHALATION) at 01:59

## 2018-01-07 RX ADMIN — Medication SCH MG: at 12:52

## 2018-01-07 RX ADMIN — Medication SCH EACH: at 16:13

## 2018-01-07 RX ADMIN — Medication PRN ML: at 04:32

## 2018-01-07 RX ADMIN — FAMOTIDINE SCH MG: 20 TABLET, FILM COATED ORAL at 08:28

## 2018-01-07 RX ADMIN — Medication SCH MG: at 19:51

## 2018-01-07 RX ADMIN — Medication SCH MG: at 01:59

## 2018-01-07 RX ADMIN — FAMOTIDINE SCH MG: 20 TABLET, FILM COATED ORAL at 16:13

## 2018-01-07 RX ADMIN — THERA TABS SCH UDTAB: TAB at 08:28

## 2018-01-07 RX ADMIN — ALBUTEROL SULFATE SCH MG: 2.5 SOLUTION RESPIRATORY (INHALATION) at 12:52

## 2018-01-07 RX ADMIN — OXYCODONE HYDROCHLORIDE AND ACETAMINOPHEN SCH MG: 500 TABLET ORAL at 08:28

## 2018-01-07 RX ADMIN — OXYCODONE HYDROCHLORIDE AND ACETAMINOPHEN SCH MG: 500 TABLET ORAL at 16:13

## 2018-01-07 RX ADMIN — Medication SCH EACH: at 08:28

## 2018-01-07 NOTE — NUR
RN NOTES

RECEIVED PT FROM NIGHT SHIFT IN STABLE CONDITION. PT OBTUNDED TOLERATING VENT SETTINGS AT 
THIS TIME. SR ON THE TELE MONITOR HR 98. ALAMO DRAINING TO GRAVITY, YELLOW IN COLOR. NGT IN 
PLACE WITH FEEDING AT 40ML/HR.  DELMA AND JAZ MIDLINE INTACT, NO IVF. BED LOCKED AND IN LOWEST 
POSITION, CALL LIGHT WITHIN REACH, SIDE RAILS UPX3, WILL CONT TO ISSA.

## 2018-01-07 NOTE — NUR
PATIENT RECEIVED ON MECHANICAL VENTILATION. SP02 @ 100%, HR 89. SUCTION DONE, TRACH SECURED 
AND PATENT. ALARMS ON AND AUDIBLE. CONTINUOUS PULSE OX @ BEDSIDE, ON AND WORKING. WILL 
MONITOR T/O SHIFT. 

-------------------------------------------------------------------------------

Addendum: 01/07/18 at 0759 by RAMYA GALLEGOS RT

-------------------------------------------------------------------------------

Amended: Links added.

## 2018-01-07 NOTE — NUR
RN NOTES

PT REMAINED IN STABLE CONDITION THROUGHOUT THE SHIFT, NO SIGNIFICANT CHANGES NOTED, ALL 
NEEDS MET. WILL ENDORSE TO ONCOMING SHIFT.

## 2018-01-07 NOTE — NUR
RN NOTES

CONSENT OBTAINED FOR GTUBE PLACEMENT FROM DAUGHTER. DAUGHTER DOES NOT WANT WOUND 
DEBRIDEMENT.

## 2018-01-08 VITALS — SYSTOLIC BLOOD PRESSURE: 118 MMHG | DIASTOLIC BLOOD PRESSURE: 34 MMHG

## 2018-01-08 VITALS — SYSTOLIC BLOOD PRESSURE: 134 MMHG | DIASTOLIC BLOOD PRESSURE: 31 MMHG

## 2018-01-08 VITALS — SYSTOLIC BLOOD PRESSURE: 132 MMHG | DIASTOLIC BLOOD PRESSURE: 43 MMHG

## 2018-01-08 VITALS — DIASTOLIC BLOOD PRESSURE: 54 MMHG | SYSTOLIC BLOOD PRESSURE: 131 MMHG

## 2018-01-08 VITALS — DIASTOLIC BLOOD PRESSURE: 37 MMHG | SYSTOLIC BLOOD PRESSURE: 156 MMHG

## 2018-01-08 VITALS — DIASTOLIC BLOOD PRESSURE: 33 MMHG | SYSTOLIC BLOOD PRESSURE: 113 MMHG

## 2018-01-08 RX ADMIN — Medication SCH EACH: at 10:10

## 2018-01-08 RX ADMIN — THERA TABS SCH UDTAB: TAB at 10:09

## 2018-01-08 RX ADMIN — ALBUTEROL SULFATE SCH MG: 2.5 SOLUTION RESPIRATORY (INHALATION) at 07:26

## 2018-01-08 RX ADMIN — Medication SCH MG: at 07:27

## 2018-01-08 RX ADMIN — ALBUTEROL SULFATE SCH MG: 2.5 SOLUTION RESPIRATORY (INHALATION) at 19:47

## 2018-01-08 RX ADMIN — Medication SCH EACH: at 16:50

## 2018-01-08 RX ADMIN — FERROUS SULFATE TAB 325 MG (65 MG ELEMENTAL FE) SCH MG: 325 (65 FE) TAB at 10:10

## 2018-01-08 RX ADMIN — FAMOTIDINE SCH MG: 20 TABLET, FILM COATED ORAL at 16:50

## 2018-01-08 RX ADMIN — Medication SCH MG: at 19:46

## 2018-01-08 RX ADMIN — ALBUTEROL SULFATE SCH MG: 2.5 SOLUTION RESPIRATORY (INHALATION) at 00:50

## 2018-01-08 RX ADMIN — OXYCODONE HYDROCHLORIDE AND ACETAMINOPHEN SCH MG: 500 TABLET ORAL at 10:10

## 2018-01-08 RX ADMIN — Medication PRN ML: at 10:45

## 2018-01-08 RX ADMIN — Medication SCH GM: at 10:10

## 2018-01-08 RX ADMIN — FAMOTIDINE SCH MG: 20 TABLET, FILM COATED ORAL at 10:09

## 2018-01-08 RX ADMIN — Medication SCH MG: at 13:46

## 2018-01-08 RX ADMIN — Medication SCH MG: at 10:10

## 2018-01-08 RX ADMIN — OXYCODONE HYDROCHLORIDE AND ACETAMINOPHEN SCH MG: 500 TABLET ORAL at 16:50

## 2018-01-08 RX ADMIN — ALBUTEROL SULFATE SCH MG: 2.5 SOLUTION RESPIRATORY (INHALATION) at 13:46

## 2018-01-08 RX ADMIN — Medication SCH MG: at 00:50

## 2018-01-08 NOTE — NUR
TELE RN OPENING NOTES

RECEIVED REPORT FROM HA MADRIGAL. PATIENT OBTUNDED. TRACH INTACT W/ VENT SETTINGS AC 12, TV 
450, FIO2 35%, PEEP 5, TOLERATING WELL. ON TELE SINUS RHYTHM IN THE 90S. RIGHT UPPER ARM 
MIDLINE INTACT & PATENT W/ DRESSING CDI & IVF NS @ 80 ML/HR. LEFT UPPER ARM MIDLINE INTACT & 
PATENT W/ DRESSING CDI, SLAINE LOCKED. NG-TUBE IN LEFT NOSTRIL INTACT W/ NGTF NUTREN @ 40 
ML/HR, NPO @ MIDNIGHT. NO RESIDUAL NOTED @ THIS TIME. ALAMO CATH DRAINING YELLOW URINE, NO 
LEAKING NOTED. NO S/S OF PAIN OR DISCOMFORT @ THIS TIME. SAFETY MEASURES IN PLACE W/ SIDE 
RAILS UP, BED LOCKED & IN LOWEST POSITION & BED ALARM ON. WILL CONTINUE TO MONITOR.

## 2018-01-08 NOTE — NUR
RN NOTES 



PATIENT ENDORSED TO NEXT SHIFT IN STABLE CONDITION FOR CONTINUITY OF CARE. NO SIGNIFICANT 
CHANGES NOTED. KEPT CLEAN, DRY AND COMFORTABLE. ALL NEEDS ATTENDED. SAFETY MEASURE OBSERVED. 
 CALL LIGHT WITH IN REACH. WILL CONT TO MONITOR.

## 2018-01-08 NOTE — NUR
RN NOTES 



RECEIVED PATIENT IN BED ON [facility] VENT WITH BREATHING NORMAL, EVEN AND UNLABORED. NO SOB 
NOTED. NO ACUTE DISTRESS NOTED. VENT SETTING REVIEWED AND VERIFIED. TOLERATED WELL.TELE 
MONITOR REVEALS SR, HR=80.  JAZ MIDLINE  IS PATENT AND INTACT, RUNNING IVF AS PER ORDER. NGT 
CLAMPED. F/C IS PATENT AND INTACT,DRAINING WITH GRAVITY. KEPT CLEAN, DRY AND COMFORTABLE. 
ALL NEEDS ATTENDED. SAFETY MEASURE OBSERVED. CALL LIGHT WITH IN REACH. WILL CONT TO MONITOR.

## 2018-01-08 NOTE — NUR
RN CLOSING NOTE



PATIENT RESTING IN BED RESTING COMFORTABLY. PT KEPT NPO, PATIENT IS OBTUNDED, NON-VERBAL.   
NO S/S OF PAIN OR DISCOMFORT. SINUS RHYTHM ON TELE MONITOR.  PT HAS EMILY JONES #6 .  NO 
S/S OF RESPIRATORY DISTRESS OR SOB. TOLERATING VENT SETTINGS WELL. SKIN IS WARM AND DRY TO 
TOUCH.  IV SITE FLUSHED, AND PATENT.  ALAMO CATHETER DRAINING TO GRAVITY.  NGT FLUSHED, 
PATENT, PLACEMENT VERIFIED. SAFETY PRECAUTIONS IMPLEMENTED.  BED IN LOCKED, LOW POSITION 
WITH TWO SIDE RAILS UP.  WILL INDORSE TO AM RN

## 2018-01-09 VITALS — DIASTOLIC BLOOD PRESSURE: 35 MMHG | SYSTOLIC BLOOD PRESSURE: 133 MMHG

## 2018-01-09 VITALS — DIASTOLIC BLOOD PRESSURE: 88 MMHG | SYSTOLIC BLOOD PRESSURE: 143 MMHG

## 2018-01-09 VITALS — DIASTOLIC BLOOD PRESSURE: 62 MMHG | SYSTOLIC BLOOD PRESSURE: 158 MMHG

## 2018-01-09 VITALS — SYSTOLIC BLOOD PRESSURE: 141 MMHG | DIASTOLIC BLOOD PRESSURE: 67 MMHG

## 2018-01-09 VITALS — DIASTOLIC BLOOD PRESSURE: 79 MMHG | SYSTOLIC BLOOD PRESSURE: 131 MMHG

## 2018-01-09 PROCEDURE — 0DH63UZ INSERTION OF FEEDING DEVICE INTO STOMACH, PERCUTANEOUS APPROACH: ICD-10-PCS

## 2018-01-09 RX ADMIN — ALBUTEROL SULFATE SCH MG: 2.5 SOLUTION RESPIRATORY (INHALATION) at 13:18

## 2018-01-09 RX ADMIN — ALBUTEROL SULFATE SCH MG: 2.5 SOLUTION RESPIRATORY (INHALATION) at 01:14

## 2018-01-09 RX ADMIN — Medication SCH MG: at 07:53

## 2018-01-09 RX ADMIN — FERROUS SULFATE TAB 325 MG (65 MG ELEMENTAL FE) SCH MG: 325 (65 FE) TAB at 08:35

## 2018-01-09 RX ADMIN — Medication SCH MLS/HR: at 16:50

## 2018-01-09 RX ADMIN — Medication SCH GM: at 08:36

## 2018-01-09 RX ADMIN — OXYCODONE HYDROCHLORIDE AND ACETAMINOPHEN SCH MG: 500 TABLET ORAL at 08:35

## 2018-01-09 RX ADMIN — ALBUTEROL SULFATE SCH MG: 2.5 SOLUTION RESPIRATORY (INHALATION) at 07:53

## 2018-01-09 RX ADMIN — Medication SCH MG: at 08:35

## 2018-01-09 RX ADMIN — Medication PRN ML: at 20:36

## 2018-01-09 RX ADMIN — Medication SCH EACH: at 08:35

## 2018-01-09 RX ADMIN — OXYCODONE HYDROCHLORIDE AND ACETAMINOPHEN SCH MG: 500 TABLET ORAL at 16:45

## 2018-01-09 RX ADMIN — Medication SCH MG: at 01:14

## 2018-01-09 RX ADMIN — ALBUTEROL SULFATE SCH MG: 2.5 SOLUTION RESPIRATORY (INHALATION) at 20:00

## 2018-01-09 RX ADMIN — Medication SCH MG: at 20:00

## 2018-01-09 RX ADMIN — FAMOTIDINE SCH MG: 20 TABLET, FILM COATED ORAL at 08:35

## 2018-01-09 RX ADMIN — FAMOTIDINE SCH MG: 20 TABLET, FILM COATED ORAL at 16:45

## 2018-01-09 RX ADMIN — Medication SCH EACH: at 16:45

## 2018-01-09 RX ADMIN — THERA TABS SCH UDTAB: TAB at 08:35

## 2018-01-09 RX ADMIN — Medication SCH MG: at 13:18

## 2018-01-09 NOTE — NUR
TELE RN OPENING NOTES

RECEIVED REPORT FROM MILAN MADRIGAL. PATIENT OBTUNDED. TRACH INTACT W/ VENT SETTINGS AC 12, TV 
450, FIO2 35%, PEEP 5, TOLERATING WELL. NO S/S OF RESPIRATORY DISTRESS. ON TELE SINUS RHYTHM 
IN THE 90S. RIGHT UPPER ARM MIDLINE INTACT & PATENT W/ DRESSING CDI & IVF NS @ 80 ML/HR. 
LEFT UPPER ARM MIDLINE INTACT & PATENT W/ DRESSING CDI, SALINE LOCKED. NEW G-TUBE INTACT W/ 
MINIMAL BLEEDING NOTED.  ALAMO CATH DRAINING YELLOW URINE, NO LEAKING NOTED. NO S/S OF PAIN 
OR DISCOMFORT @ THIS TIME. SAFETY MEASURES IN PLACE W/ SIDE RAILS UP, BED LOCKED & IN LOWEST 
POSITION & BED ALARM ON. WILL CONTINUE TO MONITOR.

## 2018-01-09 NOTE — NUR
TELE RN INITIAL NOTES

RECEIVED PATIENT IN BED  OBTUNDED. TRACH INTACT W/ VENT SETTINGS AC 12, , FIO2 35%, 
PEEP 5, TOLERATING WELL. ON TELE SINUS RHYTHM CURRENTLY. RIGHT UPPER ARM MIDLINE INTACT & 
PATENT W/ DRESSING CLEAN DRY AND INTACT & IVF NS @ 80 ML/HR. LEFT UPPER ARM MIDLINE INTACT & 
PATENT W/ DRESSING CLEAN DRY , AND INTACT , SALINE LOCKED.  PT NPO NO RESIDUAL NOTED @ THIS 
TIME. ALAMO CATH DRAINING YELLOW URINE, NO LEAKING NOTED. NO S/S OF PAIN OR DISCOMFORT @ 
THIS TIME. SAFETY MEASURES IN PLACE W/ SIDE RAILS UP, BED LOCKED & IN LOWEST POSITION & BED 
ALARM ON. RN WILL CONTINUE TO MONITOR.

## 2018-01-09 NOTE — NUR
RN CLOSING NOTE



PATIENT RESTING IN BED RESTING COMFORTABLY. PT DIET SCHEDULED TO ADVANCE AT 8PM , PATIENT IS 
REMAINS OBTUNDED, NON-VERBAL.   NO S/S OF PAIN OR DISCOMFORT. SINUS RHYTHM ON TELE MONITOR.  
PT HAS EMILY JONES #6 .  NO S/S OF RESPIRATORY DISTRESS OR SOB. TOLERATING VENT SETTINGS 
WELL. SKIN IS WARM AND DRY TO TOUCH.  IV SITE FLUSHED, AND PATENT.  ALAMO CATHETER DRAINING 
TO GRAVITY. SAFETY PRECAUTIONS IMPLEMENTED.  BED IN LOCKED, LOW POSITION WITH TWO SIDE RAILS 
UP.  RN WILL ENDORSE TO PM RN. PATIENT FAMILY UPDATED ON PLAN OF CARE

## 2018-01-09 NOTE — NUR
Female trach pt received on mechanical vent.  Pt trach is secure.  Vent is plugged into a 
red outlet, alarms are set and audible, and BVM is at bedside.

-------------------------------------------------------------------------------

Addendum: 01/09/18 at 0826 by HUNTER TIWARI RT

-------------------------------------------------------------------------------

Amended: Links added.

## 2018-01-10 VITALS — SYSTOLIC BLOOD PRESSURE: 129 MMHG | DIASTOLIC BLOOD PRESSURE: 75 MMHG

## 2018-01-10 VITALS — SYSTOLIC BLOOD PRESSURE: 153 MMHG | DIASTOLIC BLOOD PRESSURE: 62 MMHG

## 2018-01-10 VITALS — DIASTOLIC BLOOD PRESSURE: 68 MMHG | SYSTOLIC BLOOD PRESSURE: 130 MMHG

## 2018-01-10 VITALS — SYSTOLIC BLOOD PRESSURE: 141 MMHG | DIASTOLIC BLOOD PRESSURE: 60 MMHG

## 2018-01-10 VITALS — DIASTOLIC BLOOD PRESSURE: 62 MMHG | SYSTOLIC BLOOD PRESSURE: 139 MMHG

## 2018-01-10 VITALS — DIASTOLIC BLOOD PRESSURE: 60 MMHG | SYSTOLIC BLOOD PRESSURE: 133 MMHG

## 2018-01-10 RX ADMIN — OXYCODONE HYDROCHLORIDE AND ACETAMINOPHEN SCH MG: 500 TABLET ORAL at 10:06

## 2018-01-10 RX ADMIN — ALBUTEROL SULFATE SCH MG: 2.5 SOLUTION RESPIRATORY (INHALATION) at 19:35

## 2018-01-10 RX ADMIN — OXYCODONE HYDROCHLORIDE AND ACETAMINOPHEN SCH MG: 500 TABLET ORAL at 17:58

## 2018-01-10 RX ADMIN — Medication SCH MG: at 01:53

## 2018-01-10 RX ADMIN — FAMOTIDINE SCH MG: 20 TABLET, FILM COATED ORAL at 10:06

## 2018-01-10 RX ADMIN — ALBUTEROL SULFATE SCH MG: 2.5 SOLUTION RESPIRATORY (INHALATION) at 07:55

## 2018-01-10 RX ADMIN — Medication SCH MG: at 07:55

## 2018-01-10 RX ADMIN — Medication SCH MLS/HR: at 16:12

## 2018-01-10 RX ADMIN — Medication SCH MG: at 13:25

## 2018-01-10 RX ADMIN — ERGOCALCIFEROL SCH UNIT: 1.25 CAPSULE ORAL at 10:05

## 2018-01-10 RX ADMIN — Medication SCH MG: at 10:05

## 2018-01-10 RX ADMIN — Medication SCH EACH: at 17:58

## 2018-01-10 RX ADMIN — ALBUTEROL SULFATE SCH MG: 2.5 SOLUTION RESPIRATORY (INHALATION) at 01:53

## 2018-01-10 RX ADMIN — FERROUS SULFATE TAB 325 MG (65 MG ELEMENTAL FE) SCH MG: 325 (65 FE) TAB at 10:06

## 2018-01-10 RX ADMIN — THERA TABS SCH UDTAB: TAB at 10:05

## 2018-01-10 RX ADMIN — ALBUTEROL SULFATE SCH MG: 2.5 SOLUTION RESPIRATORY (INHALATION) at 13:25

## 2018-01-10 RX ADMIN — FAMOTIDINE SCH MG: 20 TABLET, FILM COATED ORAL at 17:58

## 2018-01-10 RX ADMIN — Medication SCH EACH: at 10:06

## 2018-01-10 RX ADMIN — Medication SCH GM: at 10:06

## 2018-01-10 RX ADMIN — Medication SCH MG: at 19:35

## 2018-01-10 NOTE — NUR
TELE RN DISCHARGE NOTES.



PATIENT VITAL SIGNS STABLE. NO SOB ON MECHANICAL VENTILATOR. REMOVED MIDLINE FROM LEFT UPPER 
ARM AND RIGHT UPPER ARM. EMPTIES ALAMO BAG. REMOVED TELEMETRY BOX FROM PATIENT. PATIENT 
DISCHARGED DESTINATION TO [facility]. PICKED UP BY TRANSPORTATION STAFF [employer]IA [employer]. 
BODY ASSESSMENT PICUTRES IN PATIENT'S CHART. PATIENT LEFT UNIT

## 2018-01-10 NOTE — NUR
RT NOTE



PATIENT RECIEVED IN STABLE CONDITION ON MECHANICAL VENT. PATIENT IS TOLERATING CURRENT 
ORDERED VENT SETTINGS WELL. NO SIGNS OF RESPIRATORY DISTRESS NOTED. TRACH IS PATENT AND 
SECURE. MECHANICAL VENT IS PLUGGED INTO RED OUTLETS. ALARMS ARE ON AND AUDIBLE. AMBU BAG AND 
BACKUP TRACH AT BEDSIDE.

## 2018-01-10 NOTE — NUR
TELE RN AM NOTES



RECEIVED PATIENT IN STABLE CONDITION. OBTUNDED, ON SHILEY 6 MECHANICAL VENT DEPENDENT 
SETTING AS ORDERED. NOT IN ANY DISTRESS. IN NO APPARENT DISTRESS. TELEMETRY READS SR HR 96, 
NO SIGNS OF CHEST DISCOMFORT OR PAIN, JAZ MIDLINE FLUSHES WELL, SITE CLEAR, DELMA MIDLINE WITH 
1/2 NS AT 80 ML/HR RUNNING, SITE CLEAR, ALAMO CATH IS IN PLACE, DRAINING ADEQUATE AMOUNT OF 
URINE, YELLOW CLEAR, GENERALIZED SWELLING, CONTRACTED, BEDREST, S/P PEG PLACEMENT ON 
01/09/18, SITE CLEAR, WITH NUTREN AT 25 ML/HR, 10 ML RESIDUAL, SEE NURSING FLOWSHEET FOR 
SKIN ISSUES. BEDSIDE RAILS ARE UP X2. BED IS LOCKED AND LOWERED. WILL TURN AND REPOSITION Q 
2 HOURS. WILL CONTINUE TO MONITOR.

## 2018-01-10 NOTE — NUR
TELE RN CLOSING NOTES



PATIENT IN STABLE CONDITION. MADE COMFORTABLE, OBTUNDED, ON SHILEY 6 MECHANICAL VENT 
DEPENDENT SETTING AS ORDERED. NOT IN ANY DISTRESS. TELEMETRY READS SR HR 90s, NO SIGNS OF 
CHEST DISCOMFORT OR PAIN, JAZ MIDLINE FLUSHES WELL, SITE CLEAR, DELMA MIDLINE WITH 1/2 NS AT 
80 ML/HR RUNNING, SITE CLEAR, ALAMO CATH IS IN PLACE, DRAINING ADEQUATE AMOUNT OF URINE, 
YELLOW CLEAR, 200 ML OUTPUT, GENERALIZED SWELLING, CONTRACTED, BEDREST, S/P PEG PLACEMENT ON 
01/09/18, SITE CLEAR, WITH NUTREN AT 25 ML/HR, 0 ML RESIDUAL,  BEDSIDE RAILS ARE UP X2. BED 
IS LOCKED AND LOWERED. TURNED AND REPOSITIONED Q 2 HOURS. PM CARE DONE. PERFORMED PRESCRIBED 
WOUND TREATMENT. ALL NEEDS MET. NO OTHER SIGNIFICANT CHANGE IN CONDITION. 



PATIENT FOR TRANSFER TO [facility]. REPORT GIVEN TO [facility] FACILITY STAFF EARLIER. 
PATIENT FOR  AT [phone].

ENDORSED TO ERROL MADRIGAL FOR MAISHA.



PHOTOS OF SKIN ISSUES TAKEN AND PLACED IN CHART.

## 2018-02-01 ENCOUNTER — HOSPITAL ENCOUNTER (INPATIENT)
Dept: HOSPITAL 54 - ER | Age: 83
LOS: 25 days | DRG: 870 | End: 2018-02-26
Attending: INTERNAL MEDICINE | Admitting: INTERNAL MEDICINE
Payer: MEDICARE

## 2018-02-01 VITALS — DIASTOLIC BLOOD PRESSURE: 62 MMHG | SYSTOLIC BLOOD PRESSURE: 122 MMHG

## 2018-02-01 VITALS — HEIGHT: 60 IN | BODY MASS INDEX: 28.66 KG/M2 | WEIGHT: 146 LBS

## 2018-02-01 VITALS — DIASTOLIC BLOOD PRESSURE: 68 MMHG | SYSTOLIC BLOOD PRESSURE: 124 MMHG

## 2018-02-01 VITALS — SYSTOLIC BLOOD PRESSURE: 123 MMHG | DIASTOLIC BLOOD PRESSURE: 63 MMHG

## 2018-02-01 VITALS — SYSTOLIC BLOOD PRESSURE: 140 MMHG | DIASTOLIC BLOOD PRESSURE: 80 MMHG

## 2018-02-01 VITALS — DIASTOLIC BLOOD PRESSURE: 53 MMHG | SYSTOLIC BLOOD PRESSURE: 131 MMHG

## 2018-02-01 VITALS — DIASTOLIC BLOOD PRESSURE: 58 MMHG | SYSTOLIC BLOOD PRESSURE: 124 MMHG

## 2018-02-01 DIAGNOSIS — A41.51: Primary | ICD-10-CM

## 2018-02-01 DIAGNOSIS — Z96.641: ICD-10-CM

## 2018-02-01 DIAGNOSIS — B37.0: ICD-10-CM

## 2018-02-01 DIAGNOSIS — R53.2: ICD-10-CM

## 2018-02-01 DIAGNOSIS — Z16.12: ICD-10-CM

## 2018-02-01 DIAGNOSIS — F03.90: ICD-10-CM

## 2018-02-01 DIAGNOSIS — D62: ICD-10-CM

## 2018-02-01 DIAGNOSIS — Z93.0: ICD-10-CM

## 2018-02-01 DIAGNOSIS — R65.21: ICD-10-CM

## 2018-02-01 DIAGNOSIS — J96.11: ICD-10-CM

## 2018-02-01 DIAGNOSIS — Z22.322: ICD-10-CM

## 2018-02-01 DIAGNOSIS — R65.20: ICD-10-CM

## 2018-02-01 DIAGNOSIS — E83.9: ICD-10-CM

## 2018-02-01 DIAGNOSIS — I48.0: ICD-10-CM

## 2018-02-01 DIAGNOSIS — R57.1: ICD-10-CM

## 2018-02-01 DIAGNOSIS — I50.33: ICD-10-CM

## 2018-02-01 DIAGNOSIS — Z99.11: ICD-10-CM

## 2018-02-01 DIAGNOSIS — Z99.2: ICD-10-CM

## 2018-02-01 DIAGNOSIS — I13.2: ICD-10-CM

## 2018-02-01 DIAGNOSIS — K92.2: ICD-10-CM

## 2018-02-01 DIAGNOSIS — B96.89: ICD-10-CM

## 2018-02-01 DIAGNOSIS — Z16.21: ICD-10-CM

## 2018-02-01 DIAGNOSIS — E83.51: ICD-10-CM

## 2018-02-01 DIAGNOSIS — F09: ICD-10-CM

## 2018-02-01 DIAGNOSIS — D68.59: ICD-10-CM

## 2018-02-01 DIAGNOSIS — N39.0: ICD-10-CM

## 2018-02-01 DIAGNOSIS — J90: ICD-10-CM

## 2018-02-01 DIAGNOSIS — D50.0: ICD-10-CM

## 2018-02-01 DIAGNOSIS — L89.154: ICD-10-CM

## 2018-02-01 DIAGNOSIS — E87.0: ICD-10-CM

## 2018-02-01 DIAGNOSIS — N18.6: ICD-10-CM

## 2018-02-01 DIAGNOSIS — E11.22: ICD-10-CM

## 2018-02-01 DIAGNOSIS — E43: ICD-10-CM

## 2018-02-01 DIAGNOSIS — D69.6: ICD-10-CM

## 2018-02-01 DIAGNOSIS — G92: ICD-10-CM

## 2018-02-01 DIAGNOSIS — Z93.1: ICD-10-CM

## 2018-02-01 DIAGNOSIS — E87.2: ICD-10-CM

## 2018-02-01 DIAGNOSIS — N17.0: ICD-10-CM

## 2018-02-01 DIAGNOSIS — K31.6: ICD-10-CM

## 2018-02-01 DIAGNOSIS — E27.40: ICD-10-CM

## 2018-02-01 DIAGNOSIS — E83.42: ICD-10-CM

## 2018-02-01 DIAGNOSIS — K21.9: ICD-10-CM

## 2018-02-01 DIAGNOSIS — L98.8: ICD-10-CM

## 2018-02-01 DIAGNOSIS — E87.5: ICD-10-CM

## 2018-02-01 LAB
ALBUMIN SERPL BCP-MCNC: 0.9 G/DL (ref 3.4–5)
ALP SERPL-CCNC: 341 U/L (ref 46–116)
ALT SERPL W P-5'-P-CCNC: 49 U/L (ref 12–78)
APTT PPP: 30 SEC (ref 23–34)
AST SERPL W P-5'-P-CCNC: 28 U/L (ref 15–37)
BASOPHILS # BLD AUTO: 0 /CMM (ref 0–0.2)
BASOPHILS NFR BLD AUTO: 0.1 % (ref 0–2)
BILIRUB DIRECT SERPL-MCNC: 0.1 MG/DL (ref 0–0.2)
BILIRUB SERPL-MCNC: 0.3 MG/DL (ref 0.2–1)
BUN SERPL-MCNC: 107 MG/DL (ref 7–18)
BUN SERPL-MCNC: 120 MG/DL (ref 7–18)
CALCIUM SERPL-MCNC: 6.5 MG/DL (ref 8.5–10.1)
CALCIUM SERPL-MCNC: 6.5 MG/DL (ref 8.5–10.1)
CHLORIDE SERPL-SCNC: 106 MMOL/L (ref 98–107)
CHLORIDE SERPL-SCNC: 106 MMOL/L (ref 98–107)
CO2 SERPL-SCNC: 18 MMOL/L (ref 21–32)
CO2 SERPL-SCNC: 20 MMOL/L (ref 21–32)
CREAT SERPL-MCNC: 2.7 MG/DL (ref 0.6–1.3)
CREAT SERPL-MCNC: 2.8 MG/DL (ref 0.6–1.3)
EOSINOPHIL # BLD AUTO: 0.8 /CMM (ref 0–0.7)
EOSINOPHIL NFR BLD AUTO: 4.7 % (ref 0–6)
GLUCOSE SERPL-MCNC: 105 MG/DL (ref 74–106)
GLUCOSE SERPL-MCNC: 98 MG/DL (ref 74–106)
HCT VFR BLD AUTO: 22 % (ref 33–45)
HGB BLD-MCNC: 7.4 G/DL (ref 11.5–14.8)
INR PPP: 1.27 (ref 0.87–1.13)
IRON SERPL-MCNC: 57 UG/DL (ref 50–175)
LYMPHOCYTES NFR BLD AUTO: 0.9 /CMM (ref 0.8–4.8)
LYMPHOCYTES NFR BLD AUTO: 5.4 % (ref 20–44)
MCH RBC QN AUTO: 33 PG (ref 26–33)
MCHC RBC AUTO-ENTMCNC: 34 G/DL (ref 31–36)
MCV RBC AUTO: 96 FL (ref 82–100)
MONOCYTES NFR BLD AUTO: 0.1 /CMM (ref 0.1–1.3)
MONOCYTES NFR BLD AUTO: 0.9 % (ref 2–12)
NEUTROPHILS # BLD AUTO: 14.6 /CMM (ref 1.8–8.9)
NEUTROPHILS NFR BLD AUTO: 88.9 % (ref 43–81)
NT-PROBNP SERPL-MCNC: (no result) PG/ML (ref 0–125)
PLATELET # BLD AUTO: 313 /CMM (ref 150–450)
POTASSIUM SERPL-SCNC: 7.2 MMOL/L (ref 3.5–5.1)
POTASSIUM SERPL-SCNC: 7.3 MMOL/L (ref 3.5–5.1)
PROT SERPL-MCNC: 5.8 G/DL (ref 6.4–8.2)
RBC # BLD AUTO: 2.26 MIL/UL (ref 4–5.2)
RDW COEFFICIENT OF VARIATION: 19.9 (ref 11.5–15)
SODIUM SERPL-SCNC: 136 MMOL/L (ref 136–145)
SODIUM SERPL-SCNC: 136 MMOL/L (ref 136–145)
TIBC SERPL-MCNC: 72 UG/DL (ref 250–450)
TROPONIN I SERPL-MCNC: 0.03 NG/ML (ref 0–0.06)
WBC NRBC COR # BLD AUTO: 16.4 K/UL (ref 4.3–11)

## 2018-02-01 PROCEDURE — C1751 CATH, INF, PER/CENT/MIDLINE: HCPCS

## 2018-02-01 PROCEDURE — A4217 STERILE WATER/SALINE, 500 ML: HCPCS

## 2018-02-01 PROCEDURE — A6402 STERILE GAUZE <= 16 SQ IN: HCPCS

## 2018-02-01 PROCEDURE — A6248 HYDROGEL DRSG GEL FILLER: HCPCS

## 2018-02-01 PROCEDURE — A6253 ABSORPT DRG > 48 SQ IN W/O B: HCPCS

## 2018-02-01 PROCEDURE — A4216 STERILE WATER/SALINE, 10 ML: HCPCS

## 2018-02-01 PROCEDURE — C9113 INJ PANTOPRAZOLE SODIUM, VIA: HCPCS

## 2018-02-01 PROCEDURE — A4623 TRACHEOSTOMY INNER CANNULA: HCPCS

## 2018-02-01 PROCEDURE — 5A1955Z RESPIRATORY VENTILATION, GREATER THAN 96 CONSECUTIVE HOURS: ICD-10-PCS | Performed by: INTERNAL MEDICINE

## 2018-02-01 PROCEDURE — A4606 OXYGEN PROBE USED W OXIMETER: HCPCS

## 2018-02-01 PROCEDURE — C1750 CATH, HEMODIALYSIS,LONG-TERM: HCPCS

## 2018-02-01 PROCEDURE — P9047 ALBUMIN (HUMAN), 25%, 50ML: HCPCS

## 2018-02-01 PROCEDURE — A6403 STERILE GAUZE>16 <= 48 SQ IN: HCPCS

## 2018-02-01 PROCEDURE — Z7610: HCPCS

## 2018-02-01 RX ADMIN — Medication SCH MLS/HR: at 20:07

## 2018-02-01 RX ADMIN — Medication SCH MLS/HR: at 15:48

## 2018-02-01 RX ADMIN — Medication SCH GM: at 15:48

## 2018-02-01 NOTE — NUR
RN NOTE

PATIENT HAD THREE EPISODES OF WATERY BOWEL MOVEMENTS.  STOOL SAMPLE OBTAINED. INSERTED FLEX 
SEAL BUT WOULD NOT REMAIN IN RECTUM.

## 2018-02-01 NOTE — NUR
SHERLY FROM [facility] FOR BUE/BLE EDEMA AND GENERALIZED RASH PER FAMILY.  
PATIENT IS OBTUNDED,  VENT/TRACH DEPENDENT, NO RESPIRATORY DISTRESS. MIDLINE IN 
PLACE ON DELMA. G-TUBE INTACT AND PATENT. ALAMO CATH IN PLACE AND DRAINING.  
VITALS STABLE. NAD, SAFETY AND COMFORT MEASURES IN PLACE. AWAITING MD ORDERS.

## 2018-02-01 NOTE — NUR
PT RECEIVED IN ER FROM [facility]. NO SOB/RESP DISTRESS NOTED. PT TRACH WITH SHILEY #6 CUFFED. 
PLACED PT ON MECHANICAL VENTILATOR ON SETTINGS AC- RESP RATE: 12, , 40%, PEEP 5, PER 
MD. SUCTIONED LARGE AMOUNT OF PINK TINGED, RED SPECK, CONTRERAS, THICK SECRETIONS. BREATH SOUNDS 
BILATERAL COARSE. PT AIRWAY PATENT AND SECURE. VENT ALARMS SET AND AUDIBLE. SPARE TRACH AT 
BEDSIDE, AMBU  BAG AT BEDSIDE. WILL CONTINUE TO MONITOR PT.

-------------------------------------------------------------------------------

Addendum: 02/01/18 at 0906 by SAHIL MARTINEZ RT

-------------------------------------------------------------------------------

Amended: Links added.

## 2018-02-01 NOTE — NUR
TELE RN INITIAL NOTE



PT RECEIVED IN BED WITH SON AT BEDSIDE. OBTUNDED. ON MECH VENT WITH SETTINGS WELL TOLERATED 
AND SATURATING 100%. TELE-SINUS RHYTHM 86. IV DELMA #18 CLEAN,DRY, PATENT AND INTACT. GTUBE IN 
PLACE, FLUSHING WELL AND NO RESIDUALS NOTED. GTUBE CLAMPED AT THIS TIME AND PT NPO. WILL 
FOLLOW UP WITH FURTHER ORDERS FOR FEEDING. ALAMO CATHETER IN PLACE AND DRAINING YELLOW URINE 
WITH SEDIMENT. HOB ELEVATED AND ON ASPIRATION PRECAUTIONS. ISOLATION PRECAUTIONS OBSERVED. 
WILL CONTINUE TO MONITOR.

## 2018-02-01 NOTE — NUR
PATIENT TRANSPORTED  VIA [facility]. REPORT GIVEN TO RNLUBA.  
PATIENT REMAINS STABLE.  LUBA TO PROVIDE MAISHA.

## 2018-02-01 NOTE — NUR
WOUND CARE CONSULT: PT PRESENTS WITH MULTIPLE WOUNDS INCLUDING RT MEDIAL KNEE OPEN BLISTER, 
RT LATERAL LOWER LEG DRY ABRASION, STAGE 4 ULCER TO RT BUTTOCK, STAGE 3 ULCER TO SACRUM 
EXTENDING TO BUTTOCKS, ALL PRESENT ON ADMISSION. PT IS INCONTINENT OF LIQUID STOOL. RECTAL 
TUBE INSERTED BY NURSE. PT NOTED TO HAVE 4+ PITTING EDEMA WHICH IS GENERALIZED. ALL SKIN 
PROTECTION AND WOUND RECOMMENDATIONS DISCUSSED WITH NURSING STAFF. RECOMMEND SURGICAL 
CONSULT. WILL SEE PRN. PT ON [facility] BED. MD IN AGREEMENT WITH PLAN OF 
CARE. 

-------------------------------------------------------------------------------

Addendum: 02/01/18 at 1504 by TORIBIO GARCIA WNDNU

-------------------------------------------------------------------------------

Amended: Links added.

## 2018-02-01 NOTE — NUR
RN INITIAL NOTE

PATIENT RECEIVED VIA GURNEY FROM ER.  PATIENT IS OBTUNDED.  HAS TRACH, NON-VERBAL.  
TOLERATING VENT SETTINGS WELL.  ALAMO CATHETER DRAINING TO GRAVITY.  GTUBE IS CLAMPED.  SKIN 
IS WARM AND DRY TO TOUCH.  IV SITE FLUSHED, PATENT.  SAFETY PRECAUTIONS IN PLACE.  WILL 
CONTINUE TO MONITOR.

## 2018-02-02 VITALS — DIASTOLIC BLOOD PRESSURE: 67 MMHG | SYSTOLIC BLOOD PRESSURE: 125 MMHG

## 2018-02-02 VITALS — DIASTOLIC BLOOD PRESSURE: 52 MMHG | SYSTOLIC BLOOD PRESSURE: 127 MMHG

## 2018-02-02 VITALS — SYSTOLIC BLOOD PRESSURE: 127 MMHG | DIASTOLIC BLOOD PRESSURE: 52 MMHG

## 2018-02-02 VITALS — DIASTOLIC BLOOD PRESSURE: 77 MMHG | SYSTOLIC BLOOD PRESSURE: 137 MMHG

## 2018-02-02 VITALS — SYSTOLIC BLOOD PRESSURE: 148 MMHG | DIASTOLIC BLOOD PRESSURE: 67 MMHG

## 2018-02-02 VITALS — DIASTOLIC BLOOD PRESSURE: 63 MMHG | SYSTOLIC BLOOD PRESSURE: 126 MMHG

## 2018-02-02 VITALS — DIASTOLIC BLOOD PRESSURE: 54 MMHG | SYSTOLIC BLOOD PRESSURE: 125 MMHG

## 2018-02-02 VITALS — SYSTOLIC BLOOD PRESSURE: 107 MMHG | DIASTOLIC BLOOD PRESSURE: 44 MMHG

## 2018-02-02 VITALS — SYSTOLIC BLOOD PRESSURE: 128 MMHG | DIASTOLIC BLOOD PRESSURE: 54 MMHG

## 2018-02-02 VITALS — DIASTOLIC BLOOD PRESSURE: 59 MMHG | SYSTOLIC BLOOD PRESSURE: 129 MMHG

## 2018-02-02 VITALS — DIASTOLIC BLOOD PRESSURE: 57 MMHG | SYSTOLIC BLOOD PRESSURE: 133 MMHG

## 2018-02-02 VITALS — SYSTOLIC BLOOD PRESSURE: 128 MMHG | DIASTOLIC BLOOD PRESSURE: 57 MMHG

## 2018-02-02 VITALS — DIASTOLIC BLOOD PRESSURE: 67 MMHG | SYSTOLIC BLOOD PRESSURE: 120 MMHG

## 2018-02-02 VITALS — DIASTOLIC BLOOD PRESSURE: 57 MMHG | SYSTOLIC BLOOD PRESSURE: 126 MMHG

## 2018-02-02 LAB
ALBUMIN SERPL BCP-MCNC: 2.3 G/DL (ref 3.4–5)
ALP SERPL-CCNC: 234 U/L (ref 46–116)
ALT SERPL W P-5'-P-CCNC: 37 U/L (ref 12–78)
AMYLASE SERPL-CCNC: 20 U/L (ref 25–115)
APPEARANCE UR: (no result)
APTT PPP: 36 SEC (ref 23–34)
AST SERPL W P-5'-P-CCNC: 22 U/L (ref 15–37)
BASE EXCESS BLDA CALC-SCNC: -7.7 MMOL/L
BASOPHILS # BLD AUTO: 0 /CMM (ref 0–0.2)
BASOPHILS # BLD AUTO: 0 /CMM (ref 0–0.2)
BASOPHILS NFR BLD AUTO: 0 % (ref 0–2)
BASOPHILS NFR BLD AUTO: 0.1 % (ref 0–2)
BILIRUB DIRECT SERPL-MCNC: 0.2 MG/DL (ref 0–0.2)
BILIRUB SERPL-MCNC: 0.5 MG/DL (ref 0.2–1)
BILIRUB UR QL STRIP: NEGATIVE
BUN SERPL-MCNC: 120 MG/DL (ref 7–18)
BUN SERPL-MCNC: 127 MG/DL (ref 7–18)
CALCIUM SERPL-MCNC: 6.9 MG/DL (ref 8.5–10.1)
CALCIUM SERPL-MCNC: 6.9 MG/DL (ref 8.5–10.1)
CHLORIDE SERPL-SCNC: 106 MMOL/L (ref 98–107)
CHLORIDE SERPL-SCNC: 108 MMOL/L (ref 98–107)
CHOLEST SERPL-MCNC: 75 MG/DL (ref ?–200)
CK MB SERPL-MCNC: 1.3 NG/ML (ref 0–3.6)
CO2 SERPL-SCNC: 16 MMOL/L (ref 21–32)
CO2 SERPL-SCNC: 19 MMOL/L (ref 21–32)
COLOR UR: YELLOW
CREAT SERPL-MCNC: 2.9 MG/DL (ref 0.6–1.3)
CREAT SERPL-MCNC: 2.9 MG/DL (ref 0.6–1.3)
CREAT UR-MCNC: < 13 MG/DL (ref 30–125)
DO-HGB MFR BLDA: 103.8 MMHG
EOSINOPHIL # BLD AUTO: 1 /CMM (ref 0–0.7)
EOSINOPHIL # BLD AUTO: 1.1 /CMM (ref 0–0.7)
EOSINOPHIL NFR BLD AUTO: 8.9 % (ref 0–6)
EOSINOPHIL NFR BLD AUTO: 9.9 % (ref 0–6)
EOSINOPHIL NFR BLD MANUAL: 5 % (ref 0–4)
GLUCOSE SERPL-MCNC: 116 MG/DL (ref 74–106)
GLUCOSE SERPL-MCNC: 64 MG/DL (ref 74–106)
GLUCOSE UR STRIP-MCNC: NEGATIVE MG/DL
HCT VFR BLD AUTO: 21 % (ref 33–45)
HCT VFR BLD AUTO: 24 % (ref 33–45)
HDLC SERPL-MCNC: 13 MG/DL (ref 40–60)
HGB BLD-MCNC: 6.9 G/DL (ref 11.5–14.8)
HGB BLD-MCNC: 8.1 G/DL (ref 11.5–14.8)
HGB UR QL STRIP: (no result) ERY/UL
INHALED O2 CONCENTRATION: 40 %
INR PPP: 1.38 (ref 0.87–1.13)
KETONES UR STRIP-MCNC: NEGATIVE MG/DL
LDLC SERPL DIRECT ASSAY-MCNC: 39 MG/DL (ref 0–99)
LEUKOCYTE ESTERASE UR QL STRIP: (no result)
LIPASE SERPL-CCNC: 74 U/L (ref 73–393)
LYMPHOCYTES NFR BLD AUTO: 0.4 /CMM (ref 0.8–4.8)
LYMPHOCYTES NFR BLD AUTO: 0.5 /CMM (ref 0.8–4.8)
LYMPHOCYTES NFR BLD AUTO: 3.7 % (ref 20–44)
LYMPHOCYTES NFR BLD AUTO: 4.9 % (ref 20–44)
LYMPHOCYTES NFR BLD MANUAL: 6 % (ref 16–48)
MAGNESIUM SERPL-MCNC: 1.6 MG/DL (ref 1.8–2.4)
MCH RBC QN AUTO: 32 PG (ref 26–33)
MCH RBC QN AUTO: 33 PG (ref 26–33)
MCHC RBC AUTO-ENTMCNC: 34 G/DL (ref 31–36)
MCHC RBC AUTO-ENTMCNC: 34 G/DL (ref 31–36)
MCV RBC AUTO: 93 FL (ref 82–100)
MCV RBC AUTO: 97 FL (ref 82–100)
MONOCYTES NFR BLD AUTO: 0 /CMM (ref 0.1–1.3)
MONOCYTES NFR BLD AUTO: 0.1 /CMM (ref 0.1–1.3)
MONOCYTES NFR BLD AUTO: 0.3 % (ref 2–12)
MONOCYTES NFR BLD AUTO: 0.7 % (ref 2–12)
MONOCYTES NFR BLD MANUAL: 1 % (ref 0–11)
NEUTROPHILS # BLD AUTO: 10.3 /CMM (ref 1.8–8.9)
NEUTROPHILS # BLD AUTO: 8.5 /CMM (ref 1.8–8.9)
NEUTROPHILS NFR BLD AUTO: 84.8 % (ref 43–81)
NEUTROPHILS NFR BLD AUTO: 86.7 % (ref 43–81)
NEUTS SEG NFR BLD MANUAL: 88 % (ref 42–76)
NITRITE UR QL STRIP: NEGATIVE
NT-PROBNP SERPL-MCNC: (no result) PG/ML (ref 0–125)
PCO2 TEMP ADJ BLDA: 28.6 MMHG (ref 35–45)
PH TEMP ADJ BLDA: 7.38 [PH] (ref 7.35–7.45)
PH UR STRIP: 6 [PH] (ref 5–8)
PHOSPHATE SERPL-MCNC: 7 MG/DL (ref 2.5–4.9)
PLATELET # BLD AUTO: 293 /CMM (ref 150–450)
PLATELET # BLD AUTO: 318 /CMM (ref 150–450)
PO2 TEMP ADJ BLDA: 148.5 MMHG (ref 75–100)
POTASSIUM SERPL-SCNC: 7.1 MMOL/L (ref 3.5–5.1)
POTASSIUM SERPL-SCNC: 7.1 MMOL/L (ref 3.5–5.1)
PREALB SERPL-MCNC: 11.3 MG/DL (ref 18–35.7)
PROT SERPL-MCNC: 6.4 G/DL (ref 6.4–8.2)
PROT UR QL STRIP: (no result) MG/DL
PROT UR-MCNC: 162.5 MG/DL (ref 0–11.9)
RBC # BLD AUTO: 2.13 MIL/UL (ref 4–5.2)
RBC # BLD AUTO: 2.54 MIL/UL (ref 4–5.2)
RBC #/AREA URNS HPF: (no result) /HPF (ref 0–2)
RDW COEFFICIENT OF VARIATION: 19.3 (ref 11.5–15)
RDW COEFFICIENT OF VARIATION: 19.6 (ref 11.5–15)
RETICS/RBC NFR AUTO: 3.1 % (ref 0.6–2.5)
SAO2 % BLDA: 98.1 % (ref 92–98.5)
SODIUM SERPL-SCNC: 138 MMOL/L (ref 136–145)
SODIUM SERPL-SCNC: 142 MMOL/L (ref 136–145)
SODIUM UR-SCNC: 82 MMOL/L (ref 40–220)
TRIGL SERPL-MCNC: 33 MG/DL (ref 30–150)
TSH SERPL DL<=0.005 MIU/L-ACNC: 0.94 UIU/ML (ref 0.36–3.74)
UROBILINOGEN UR STRIP-MCNC: 0.2 EU/DL
VENTILATION MODE VENT: (no result)
WBC #/AREA URNS HPF: (no result) /HPF (ref 0–3)
WBC NRBC COR # BLD AUTO: 10 K/UL (ref 4.3–11)
WBC NRBC COR # BLD AUTO: 11.9 K/UL (ref 4.3–11)

## 2018-02-02 PROCEDURE — 30233N1 TRANSFUSION OF NONAUTOLOGOUS RED BLOOD CELLS INTO PERIPHERAL VEIN, PERCUTANEOUS APPROACH: ICD-10-PCS | Performed by: NURSE PRACTITIONER

## 2018-02-02 RX ADMIN — Medication SCH MLS/HR: at 08:09

## 2018-02-02 RX ADMIN — Medication PRN ML: at 18:11

## 2018-02-02 RX ADMIN — Medication SCH MLS/HR: at 01:05

## 2018-02-02 RX ADMIN — MUPIROCIN SCH GM: 20 OINTMENT TOPICAL at 21:02

## 2018-02-02 RX ADMIN — MUPIROCIN SCH GM: 20 OINTMENT TOPICAL at 15:43

## 2018-02-02 RX ADMIN — SODIUM CHLORIDE SCH MG: 9 INJECTION, SOLUTION INTRAVENOUS at 09:38

## 2018-02-02 RX ADMIN — Medication SCH GM: at 09:38

## 2018-02-02 NOTE — NUR
RN NOTES:

-CALLED DR. LIZ AND NOTIFIED REGARDING PATIENT RESULT K-7.1,RECEIVED AN ORDER AND READ 
BACK TO .  NOTIFIED CN.

-2156-ORDER WAS FAX TO SUPERVISOR AND VERIFIED ORDER ,SHE ADVISE TO CLARIFY WITH PHARMACIST 
FIRST

CALLED, OLIVER(PHAMACIST) AND ADVISE TO VERIFY DOSING WITH THE DOCTOR.

-2220-CALLED BACK AGAIN  AND VERIFIED ORDER,HE SAID MEDICATION SHOULD BE GIVEN AS 
ORDERED.

-2313-CALLED BACK OLIVER AGAIN AND REQUEST TO PREPARE THE MEDICATION.

## 2018-02-02 NOTE — NUR
RN TELE NOTE: 



ALAMO CATHETER WAS REMOVED. CATHETER TIP PATENT AND INTACT UPON REMOVAL. NO DISTRESS NOTED 
WITH THE PATIENT. WILL MONITOR FOR ANY S/S OF URINARY RETENTION.

## 2018-02-02 NOTE — NUR
RN TELE NOTE:



DR. PHOENIX CAME AND SPOKE WITH PT'S DAUGHTER LUCIO, PRESENT AT BEDSIDE. PER DAUGHTER'S 
REQUEST SHE WANTED THE ALAMO CATHETER TO BE REMOVE. PER MD, OK OT REMOVE ALAMO CATHETER.

## 2018-02-02 NOTE — NUR
RN NOTES:

PER ENDORSEMENT PMD OFFICE WAS NOTIFIED, F/U AGAIN, SPOKE WITH RAVINDRA IN PMD ANSWERING 
SERVICE.AWAITING FOR RETURN CALL FROM (ON CALL).

## 2018-02-02 NOTE — NUR
RN TELE NOTE: 



PATIENT WAS CHANGED DUE TO BM MIXED WITH URINE. CHANGED AND REPOSITIONED. DRESSINGS CHANGED 
AS WELL. REPORT ENDORSED TO ROHAN MAGAÑA FOR CONTINUITY OF CARE.

## 2018-02-02 NOTE — NUR
RN TELE NOTE: 



DR. FERMIN INFORMED ABOUT THE PATIENT'S MRSA NARE (+). MD WITH NEW ORDER FOR BACTROBAN 
OINTMENT.

## 2018-02-02 NOTE — NUR
RN TELE NOTE: 



CALLED AND PAGED DR. PHOENIX RE: PT'S  & K+ 7.1 MADE MD AWARE THAT THE PATIENT'S 
KAYEXALATE GIVEN RECTALLY AS DIRECTED WAS RELEASED UPON PATIENT'S BM. MD AWARE OF THE SOFT 
BM X 2 TODAY. AWAITING FOR MD'S CALL BACK.

## 2018-02-02 NOTE — NUR
RN TELE NOTE:



RECEIVED PATIENT IN BED, ASLEEP AND RESTING COMFORTABLE IN BED. HOB ELEVATED. NOT ON ANY 
FORM OF DISTRESS. NO S/S OF DISCOMFORT OR PAIN NOTED. ON GT FEEDING (NUTREN @40ML/HR) AND 
TOLERATING IT WELL. NO RESIDUAL NOTED. ON VENT-TRACH DEPENDENT, CURRENT SETTING WELL 
TOLERATED. SATURATING 100%. BED ALARM AND LOCKED ON AT ALL TIMES. CALL LIGHT WITHIN REACH.

## 2018-02-02 NOTE — NUR
TELE RN NOTE



DAUGHTER AT BEDSIDE ASKING FOR A  OR  TO ASSIST WITH BETTER 
PLACEMENT OR POSSIBLE PLACEMENT AT HOME WITH FAMILY. WILL ENDORSE TO MORNING SHIFT.

## 2018-02-02 NOTE — NUR
RN NOTES:

RECEIVED PATIENT LYING ON BED,ON VENTILATOR, WITH PEG FEED ONGOING OF NOVASOURCE AT 25 
ML/HR, PEG SITE INTACT;ON TELE MONITOR SR-RATE-84,DELMA-MIDLINE INTACT;PATIENT IS OBSTUNDED, 
ON ISOLATION PRECAUTION FOR POSSIBLE C-DIFF AND MRSA-NARES,PER ENDORSEMENT ALAMO CATH REMOVE 
AT AROUND 1PM, PRIOR ENDORSEMENT PATIENT HAD BM AND URINE ON DIAPER;WILL CONTINUE TO MONITOR 
FOR POST BT REACTION.KEPT ON CLOSE WATCH, FALL,SAFETY AND ASPIRATION PRECAUTION OBSERVED, 
CALL LIGHT WITHIN EASY REACH.

## 2018-02-02 NOTE — NUR
PT RCVD ON VENT WITH NOTED SETTINGS. VENTS PLUGGED INTO RED OUTLET, VENT ALARM WORKING AND 
AUDIBLE. SUCTIONED LARGE  AMOUNT OF YELLOW THICK SECRETIONS WITH TINGED BLOOD . BILATERAL BS 
NOTED, NO RESPIRATORY DISTRESS NOTED AT THIS TIME. WILL CONTINUE TO MONITOR THE PT.

## 2018-02-02 NOTE — NUR
RN TELE NOTE: 



CALLED AND SPOKE WITH DR. FERMIN AND MADE HER AWARE OF THE PT'S HGB/HCT (6.9/21) AND MD WITH 
ORDER FOR 1 UNIT PRBC AND RECHECK CBC 1 HOUR AFTER THE BLOOD TRANSFUSION. CALLED AND SPOKE 
WITH LUCIO DE LOS SANTOS, DAUGHTER AND MADE HER AWARE OF THE PT'S BLOOD LEVEL (H/H) AND THE MD ORDER 
FOR BLOOD TRANSFUSION. SHE AGREED AND VERIFIED VERBAL CONSENT OVER THE PHONE WITH ANOTHER 
RN.

## 2018-02-02 NOTE — NUR
TELE RN NOTE



SPOKE WITH ON CALL DR. MCCORD WITH NEW ORDER TO START GTUBE FEEDING. ORDERS NOTED AND 
CARRIED OUT.

## 2018-02-03 VITALS — DIASTOLIC BLOOD PRESSURE: 83 MMHG | SYSTOLIC BLOOD PRESSURE: 132 MMHG

## 2018-02-03 VITALS — SYSTOLIC BLOOD PRESSURE: 141 MMHG | DIASTOLIC BLOOD PRESSURE: 66 MMHG

## 2018-02-03 VITALS — DIASTOLIC BLOOD PRESSURE: 60 MMHG | SYSTOLIC BLOOD PRESSURE: 109 MMHG

## 2018-02-03 VITALS — SYSTOLIC BLOOD PRESSURE: 136 MMHG | DIASTOLIC BLOOD PRESSURE: 61 MMHG

## 2018-02-03 VITALS — DIASTOLIC BLOOD PRESSURE: 65 MMHG | SYSTOLIC BLOOD PRESSURE: 110 MMHG

## 2018-02-03 VITALS — SYSTOLIC BLOOD PRESSURE: 130 MMHG | DIASTOLIC BLOOD PRESSURE: 56 MMHG

## 2018-02-03 LAB
ALBUMIN SERPL BCP-MCNC: 2 G/DL (ref 3.4–5)
ALP SERPL-CCNC: 238 U/L (ref 46–116)
ALT SERPL W P-5'-P-CCNC: 39 U/L (ref 12–78)
APPEARANCE UR: (no result)
AST SERPL W P-5'-P-CCNC: 20 U/L (ref 15–37)
BASE EXCESS BLDA CALC-SCNC: -1.4 MMOL/L
BASOPHILS # BLD AUTO: 0 /CMM (ref 0–0.2)
BASOPHILS NFR BLD AUTO: 0.2 % (ref 0–2)
BILIRUB SERPL-MCNC: 0.5 MG/DL (ref 0.2–1)
BILIRUB UR QL STRIP: NEGATIVE
BUN SERPL-MCNC: 120 MG/DL (ref 7–18)
CALCIUM SERPL-MCNC: 7.1 MG/DL (ref 8.5–10.1)
CHLORIDE SERPL-SCNC: 113 MMOL/L (ref 98–107)
CK SERPL-CCNC: 24 U/L (ref 26–192)
CO2 SERPL-SCNC: 23 MMOL/L (ref 21–32)
COLOR UR: YELLOW
CREAT SERPL-MCNC: 3.1 MG/DL (ref 0.6–1.3)
CREAT UR-MCNC: < 13 MG/DL (ref 30–125)
DO-HGB MFR BLDA: 182.7 MMHG
EOSINOPHIL # BLD AUTO: 1 /CMM (ref 0–0.7)
EOSINOPHIL NFR BLD AUTO: 10.4 % (ref 0–6)
GLUCOSE SERPL-MCNC: 84 MG/DL (ref 74–106)
GLUCOSE UR STRIP-MCNC: NEGATIVE MG/DL
HCT VFR BLD AUTO: 25 % (ref 33–45)
HGB BLD-MCNC: 8.7 G/DL (ref 11.5–14.8)
HGB UR QL STRIP: (no result) ERY/UL
INHALED O2 CONCENTRATION: 40 %
INTRINSIC PEEP RESPIRATORY: 5 CM H2O
KETONES UR STRIP-MCNC: NEGATIVE MG/DL
LEUKOCYTE ESTERASE UR QL STRIP: (no result)
LYMPHOCYTES NFR BLD AUTO: 0.7 /CMM (ref 0.8–4.8)
LYMPHOCYTES NFR BLD AUTO: 7.3 % (ref 20–44)
MAGNESIUM SERPL-MCNC: 1.6 MG/DL (ref 1.8–2.4)
MCH RBC QN AUTO: 32 PG (ref 26–33)
MCHC RBC AUTO-ENTMCNC: 34 G/DL (ref 31–36)
MCV RBC AUTO: 93 FL (ref 82–100)
MONOCYTES NFR BLD AUTO: 0.1 /CMM (ref 0.1–1.3)
MONOCYTES NFR BLD AUTO: 0.5 % (ref 2–12)
NEUTROPHILS # BLD AUTO: 7.7 /CMM (ref 1.8–8.9)
NEUTROPHILS NFR BLD AUTO: 81.6 % (ref 43–81)
NITRITE UR QL STRIP: NEGATIVE
PCO2 TEMP ADJ BLDA: 37.1 MMHG (ref 35–45)
PEEP SETTING VENT: 450 ML
PH TEMP ADJ BLDA: 7.41 [PH] (ref 7.35–7.45)
PH UR STRIP: 6.5 [PH] (ref 5–8)
PHOSPHATE SERPL-MCNC: 6.7 MG/DL (ref 2.5–4.9)
PLATELET # BLD AUTO: 291 /CMM (ref 150–450)
PO2 TEMP ADJ BLDA: 59.8 MMHG (ref 75–100)
POTASSIUM SERPL-SCNC: 6.7 MMOL/L (ref 3.5–5.1)
PROT SERPL-MCNC: 6.2 G/DL (ref 6.4–8.2)
PROT UR QL STRIP: (no result) MG/DL
PROT UR-MCNC: 167.7 MG/DL (ref 0–11.9)
RBC # BLD AUTO: 2.71 MIL/UL (ref 4–5.2)
RBC #/AREA URNS HPF: (no result) /HPF (ref 0–2)
RDW COEFFICIENT OF VARIATION: 19.9 (ref 11.5–15)
SAO2 % BLDA: 89.3 % (ref 92–98.5)
SET RATE, BG: 12
SODIUM SERPL-SCNC: 149 MMOL/L (ref 136–145)
SODIUM UR-SCNC: 91 MMOL/L (ref 40–220)
UROBILINOGEN UR STRIP-MCNC: 0.2 EU/DL
WBC #/AREA URNS HPF: (no result) /HPF (ref 0–3)
WBC NRBC COR # BLD AUTO: 9.4 K/UL (ref 4.3–11)

## 2018-02-03 PROCEDURE — 5A1D70Z PERFORMANCE OF URINARY FILTRATION, INTERMITTENT, LESS THAN 6 HOURS PER DAY: ICD-10-PCS | Performed by: INTERNAL MEDICINE

## 2018-02-03 RX ADMIN — SODIUM CHLORIDE SCH MG: 9 INJECTION, SOLUTION INTRAVENOUS at 10:12

## 2018-02-03 RX ADMIN — DEXTROSE MONOHYDRATE PRN MLS/HR: 50 INJECTION, SOLUTION INTRAVENOUS at 18:49

## 2018-02-03 RX ADMIN — DEXTROSE MONOHYDRATE SCH MLS/HR: 50 INJECTION, SOLUTION INTRAVENOUS at 18:49

## 2018-02-03 RX ADMIN — MUPIROCIN SCH GM: 20 OINTMENT TOPICAL at 20:50

## 2018-02-03 RX ADMIN — Medication SCH GM: at 10:14

## 2018-02-03 RX ADMIN — MUPIROCIN SCH GM: 20 OINTMENT TOPICAL at 10:13

## 2018-02-03 RX ADMIN — DEXTROSE MONOHYDRATE PRN MLS/HR: 50 INJECTION, SOLUTION INTRAVENOUS at 02:37

## 2018-02-03 RX ADMIN — Medication PRN MLS/HR: at 17:44

## 2018-02-03 NOTE — NUR
rn note;



PT IN BED RESTING COMFORTABLY. BREATHING EVENLY. TRACH IN PLACE LY IN PLACE. NO SOB. NAD. 
SKIN WARM, AND DRY. SR ON TELE MONITOR. NO S/S OF PAIN OR DISCOMFORT. RECTAL TUBE W/ NO 
OUTPUT. GT IN PLACE . HOB ELEVATED. BED LOW LOCKED, CALL LIGHT WITHIN REACH . WILL CONT TO 
MONITOR ,

## 2018-02-03 NOTE — NUR
PT RECEIVED TRACHED ON VENT. NO RESP DISTRESS NOTED. PT TOLERATING VENT SETTINGS. SX'D FOR 
SML AMT OF THICK PALE SECRETIONS. VENT ALARMS SET AND AUDIBLE. AMBU BAG AT [facility]. VENT PLUGGED 
INTO RED OUTLET. WILL CONTINUE TO MONITOR.

-------------------------------------------------------------------------------

Addendum: 02/03/18 at 2029 by ANDREE GRIJALVA RT

-------------------------------------------------------------------------------

Amended: Links added.

## 2018-02-03 NOTE — NUR
RECEIVED PT TRACH ON MECHANICAL VENTILATOR ON SETTINGS PER MD ORDER. PT HAS NO SOB/RESP 
DISTRESS. AIRWAY PATENT AND SECURE. VENT ALARMS SET. PT BREATH SOUNDS BILATERAL RHONCHI. 
SUCTIONED TAN/YELLOW THICK MODERATED AMOUNT OF SECRETIONS. WILL CONTINUE TO MONITOR PT.

-------------------------------------------------------------------------------

Addendum: 02/03/18 at 1740 by SAHIL MARTINEZ RT

-------------------------------------------------------------------------------

Amended: Links added.

## 2018-02-03 NOTE — NUR
RN NOTES:

TURNING AND REPOSITIONING DONE, BED BATH RENDERED, CLEAN AND CHANGE, WHILE CHANGING THE 
DIAPER SHE IS PASSING, YELLOWISH COLORED WATERY STOOL WITH FOUL SMELL IN LARGE AMOUNT, 
DRESSING ON THE PRESSURE AREAS DONE,SUCTIONING DONE,THEN KEPT IN SEMI FOWLERS POSITION,OFF 
LOADING,CALL LIGHT WITHIN EASY REACH.

## 2018-02-03 NOTE — NUR
RN NOTES:

ENDORSED FOR CONTINUITY OF CARE, OBSTUNDED, ON VENTILATOR, ON TELE-MONITOR SR RATE 88;NO 
SIGN OF RESPIRATORY DEPRESSION NOTED,RELATIVES CAME IN LAST NIGHT AT AROUND 2300(SON ALI) 
AND 0200((2 DAUGHTER), EXPLAINED TO THEM PATIENT ON GOING TREATMENT FOR HYPERKALEMIA,STILL 
ON PEG FEED OF NOVASOURCE AT 25 ML/HR,SC DONE UNDER ASEPTIC TECHNIQUE,URINE OBTAINED FOR 
TEST, NOTIFIED,IVF ONGOING.BED LOW AND LOCKED, ISOLATION PRECAUTION OBSERVED.

## 2018-02-03 NOTE — NUR
RN NOTE

SPOKE EARLIER WITH DR FERMIN REGARDING THE POTASSIUM LEVEL AND BUN, SHE ORDERED KYAXALATE 
AND TO NOTIFY NEPHROLOGY DR ABOUT NEED OF HD. LATER TEMPORARY HD CATHETER WAS INSERTED BY 
SOULEYMANE GALLEGO IN R FEMORAL VEIN, PT TOLERATED PROCEDURE WELL. WILL MONITOR PT CLOSELY. SAFETY 
AND ISOLATION PRECAUTIONS OBSERVED  AT ALL TIMES. PT HAD LARGE BOWEL MOVEMENT WITH PARTIALLY 
DIGESTED FEEDING, YELLOW COLOR. PT HAD RSIDUAL  ML. FEEDING BEEN HELD SINCE MORNING. 
WILL RECHECK LATER.

## 2018-02-04 VITALS — SYSTOLIC BLOOD PRESSURE: 100 MMHG | DIASTOLIC BLOOD PRESSURE: 57 MMHG

## 2018-02-04 VITALS — SYSTOLIC BLOOD PRESSURE: 133 MMHG | DIASTOLIC BLOOD PRESSURE: 81 MMHG

## 2018-02-04 VITALS — SYSTOLIC BLOOD PRESSURE: 121 MMHG | DIASTOLIC BLOOD PRESSURE: 58 MMHG

## 2018-02-04 VITALS — SYSTOLIC BLOOD PRESSURE: 107 MMHG | DIASTOLIC BLOOD PRESSURE: 51 MMHG

## 2018-02-04 VITALS — DIASTOLIC BLOOD PRESSURE: 44 MMHG | SYSTOLIC BLOOD PRESSURE: 98 MMHG

## 2018-02-04 VITALS — SYSTOLIC BLOOD PRESSURE: 141 MMHG | DIASTOLIC BLOOD PRESSURE: 73 MMHG

## 2018-02-04 LAB
BASOPHILS # BLD AUTO: 0 /CMM (ref 0–0.2)
BASOPHILS NFR BLD AUTO: 0 % (ref 0–2)
BUN SERPL-MCNC: 82 MG/DL (ref 7–18)
CALCIUM SERPL-MCNC: 6.8 MG/DL (ref 8.5–10.1)
CHLORIDE SERPL-SCNC: 110 MMOL/L (ref 98–107)
CO2 SERPL-SCNC: 23 MMOL/L (ref 21–32)
CREAT SERPL-MCNC: 2.4 MG/DL (ref 0.6–1.3)
EOSINOPHIL # BLD AUTO: 0.8 /CMM (ref 0–0.7)
EOSINOPHIL NFR BLD AUTO: 5.9 % (ref 0–6)
GLUCOSE SERPL-MCNC: 123 MG/DL (ref 74–106)
HCT VFR BLD AUTO: 23 % (ref 33–45)
HGB BLD-MCNC: 7.7 G/DL (ref 11.5–14.8)
LYMPHOCYTES NFR BLD AUTO: 0.4 /CMM (ref 0.8–4.8)
LYMPHOCYTES NFR BLD AUTO: 3.4 % (ref 20–44)
MAGNESIUM SERPL-MCNC: 1.8 MG/DL (ref 1.8–2.4)
MCH RBC QN AUTO: 32 PG (ref 26–33)
MCHC RBC AUTO-ENTMCNC: 34 G/DL (ref 31–36)
MCV RBC AUTO: 93 FL (ref 82–100)
MONOCYTES NFR BLD AUTO: 0 /CMM (ref 0.1–1.3)
MONOCYTES NFR BLD AUTO: 0.3 % (ref 2–12)
NEUTROPHILS # BLD AUTO: 11.7 /CMM (ref 1.8–8.9)
NEUTROPHILS NFR BLD AUTO: 90.4 % (ref 43–81)
PHOSPHATE SERPL-MCNC: 5.1 MG/DL (ref 2.5–4.9)
PLATELET # BLD AUTO: 214 /CMM (ref 150–450)
POTASSIUM SERPL-SCNC: 4.2 MMOL/L (ref 3.5–5.1)
RBC # BLD AUTO: 2.42 MIL/UL (ref 4–5.2)
RDW COEFFICIENT OF VARIATION: 20.6 (ref 11.5–15)
SODIUM SERPL-SCNC: 148 MMOL/L (ref 136–145)
WBC NRBC COR # BLD AUTO: 12.9 K/UL (ref 4.3–11)

## 2018-02-04 PROCEDURE — B54BZZA ULTRASONOGRAPHY OF RIGHT LOWER EXTREMITY VEINS, GUIDANCE: ICD-10-PCS | Performed by: NURSE PRACTITIONER

## 2018-02-04 PROCEDURE — 5A1D70Z PERFORMANCE OF URINARY FILTRATION, INTERMITTENT, LESS THAN 6 HOURS PER DAY: ICD-10-PCS | Performed by: INTERNAL MEDICINE

## 2018-02-04 PROCEDURE — 06HM33Z INSERTION OF INFUSION DEVICE INTO RIGHT FEMORAL VEIN, PERCUTANEOUS APPROACH: ICD-10-PCS | Performed by: NURSE PRACTITIONER

## 2018-02-04 RX ADMIN — Medication PRN ML: at 21:02

## 2018-02-04 RX ADMIN — DEXTROSE MONOHYDRATE SCH MLS/HR: 50 INJECTION, SOLUTION INTRAVENOUS at 20:50

## 2018-02-04 RX ADMIN — DEXTROSE MONOHYDRATE PRN MLS/HR: 50 INJECTION, SOLUTION INTRAVENOUS at 15:40

## 2018-02-04 RX ADMIN — MUPIROCIN SCH GM: 20 OINTMENT TOPICAL at 20:42

## 2018-02-04 RX ADMIN — SODIUM CHLORIDE SCH MG: 9 INJECTION, SOLUTION INTRAVENOUS at 08:24

## 2018-02-04 RX ADMIN — DEXTROSE MONOHYDRATE SCH MLS/HR: 50 INJECTION, SOLUTION INTRAVENOUS at 20:42

## 2018-02-04 RX ADMIN — DEXTROSE MONOHYDRATE PRN MLS/HR: 50 INJECTION, SOLUTION INTRAVENOUS at 09:31

## 2018-02-04 RX ADMIN — Medication SCH GM: at 08:24

## 2018-02-04 RX ADMIN — Medication PRN MLS/HR: at 19:24

## 2018-02-04 RX ADMIN — MUPIROCIN SCH GM: 20 OINTMENT TOPICAL at 08:27

## 2018-02-04 RX ADMIN — Medication PRN EACH: at 06:10

## 2018-02-04 NOTE — NUR
RN NOTE;



PT IN BED RESTING COMFORTABLY. VENT DEPENDENT. TRACH IN PLACE. BREATHING EVENLY. NO SOB. W/ 
ON AND OFF EPISODE OF HIGH HEART. PT WAS REPOSITIONED. NORCO GIVE FOR PAIN MANAGEMENT . ON 
ONGOING MONITORING . CLEANED AND DRIED. GOOD SKIN CARE RENDERED. BED LOW LOCKED. CALL LIGHT 
WITHIN REACH. WILL CONT TO MONITOR , AND WILL ENDORSE TO AM SHIFT FOR MAISHA.

## 2018-02-04 NOTE — NUR
PT RECEIVED TRACHED ON VENT. NO RESP DISTRESS NOTED. PT TOLERATING VENT SETTINGS. SX'D FOR 
SML AMT OF THICK PALE SECRETIONS. VENT ALARMS SET AND AUDIBLE. AMBU BAG AT hospitals. VENT PLUGGED 
INTO RED OUTLET. WILL CONTINUE TO MONITOR.

-------------------------------------------------------------------------------

Addendum: 02/04/18 at 2219 by ANDREE GRIJALVA RT

-------------------------------------------------------------------------------

Amended: Links added.

## 2018-02-04 NOTE — NUR
NORCO GIVEN AS ORDERED FOR PAIN AS EVIDENCE BY INCREASED HR. PT WAS ALSO TURNED TO A 
COMFORTABLE POSITION. WILL CONT TO MONITOR.

## 2018-02-04 NOTE — NUR
TD RN NOTES



[facility]. PATIENT WAS AFIB DOCTOR NOTIFIED, DR FERMIN ORDERED AMIODARONE DRIP PER PROTOCOL, CONVERTED AROUND 10 06 TO SR HR 72. PATIENT ON 
DIALYSIS, VITAL SIGNS ARE STABLE UPON ENDORSING TO THE PM NURSE. DUE MEDS GIVEN. HEAD OF BED 
ELEVATED. SIDE RAILS UP. WILL ENDORSED PT TO PM NURSE.

## 2018-02-04 NOTE — NUR
GIULIANO MADRIGAL NOTES



AMIODARONE DRIP DECREASED TO .5MG/ML BLOOD 

-------------------------------------------------------------------------------

Addendum: 02/04/18 at 1542 by ANDREE DODSON RN

-------------------------------------------------------------------------------

BLOOD PRESSURE /58 HR OF 74

## 2018-02-04 NOTE — NUR
TAYO RN INITIAL NOTES



RECEIVED PATIENT OBTUNDED, NON-VERBAL, VENT DEPENDENT.  NO S/S OF PAIN OR DISCOMFORT.  NO 
RESPIRATORY DISTRESS NOTED.  WITH VENT SETTINGS AC 12, , FIO2 40%, PEEP 5, SPO2 100%.  
HD NURSE AT BEDSIDE, WITH HD ONGOING.  WITH GTF AT 25ML/HR.  GT PATENT AND INTACT, IN PLACE. 
 WITH DELMA MIDLINE PATENT AND INTACT WITH AMIODARONE AT 0.5MG/MIN.  PATIENT SR 74.  WITH 
RIGHT FEMORAL TLC, PATENT AND INTACT.  HOB ELEVATED.  SIDE RAILS UP AND LOCKED.  BED KEPT AT 
LOWEST POSITION.  ISOLATION PRECAUTIONS OBSERVED.  WILL CONTINUE TO MONITOR.

## 2018-02-04 NOTE — NUR
TELE RN NOTES



RECEIVED PT ON BED, SLEEPING , AFIB . ON MECH VENT SETTING SATURATING WELL. NO SIGN OF 
RESPIRATORY DISTRESS.ON NOVASOURCE 25CC/HR RESIDUAL OF 100CC, GT FEEDING STOPPED DUE TO 
RESIDUAL. IV ACCESS ON DELMA MIDLINE RUNNING WELL. NO PAIN OR REDNESS. HEAD OF BED ELEVATED. 
SIDE RAILS UP. CALL LIGHT WITHIN REACH. WILL CONTINUE TO MONITOR PT CLOSELY.

## 2018-02-05 VITALS — SYSTOLIC BLOOD PRESSURE: 123 MMHG | DIASTOLIC BLOOD PRESSURE: 56 MMHG

## 2018-02-05 VITALS — DIASTOLIC BLOOD PRESSURE: 67 MMHG | SYSTOLIC BLOOD PRESSURE: 146 MMHG

## 2018-02-05 VITALS — SYSTOLIC BLOOD PRESSURE: 121 MMHG | DIASTOLIC BLOOD PRESSURE: 53 MMHG

## 2018-02-05 VITALS — DIASTOLIC BLOOD PRESSURE: 53 MMHG | SYSTOLIC BLOOD PRESSURE: 121 MMHG

## 2018-02-05 VITALS — DIASTOLIC BLOOD PRESSURE: 66 MMHG | SYSTOLIC BLOOD PRESSURE: 138 MMHG

## 2018-02-05 VITALS — DIASTOLIC BLOOD PRESSURE: 63 MMHG | SYSTOLIC BLOOD PRESSURE: 137 MMHG

## 2018-02-05 LAB
ALBUMIN SERPL BCP-MCNC: 2.2 G/DL (ref 3.4–5)
ALBUMIN SERPL ELPH-MCNC: 2.5 G/DL (ref 2.9–4.4)
ALBUMIN/GLOB SERPL: 0.8 {RATIO} (ref 0.7–1.7)
ALP SERPL-CCNC: 305 U/L (ref 46–116)
ALPHA1 GLOB SERPL ELPH-MCNC: 0.4 G/DL (ref 0–0.4)
ALPHA2 GLOB SERPL ELPH-MCNC: 0.6 G/DL (ref 0.4–1)
ALT SERPL W P-5'-P-CCNC: 35 U/L (ref 12–78)
AST SERPL W P-5'-P-CCNC: 23 U/L (ref 15–37)
B-GLOBULIN SERPL ELPH-MCNC: 0.6 G/DL (ref 0.7–1.3)
BASOPHILS # BLD AUTO: 0 /CMM (ref 0–0.2)
BASOPHILS NFR BLD AUTO: 0.1 % (ref 0–2)
BILIRUB SERPL-MCNC: 0.7 MG/DL (ref 0.2–1)
BUN SERPL-MCNC: 56 MG/DL (ref 7–18)
CALCIUM SERPL-MCNC: 7.7 MG/DL (ref 8.5–10.1)
CHLORIDE SERPL-SCNC: 111 MMOL/L (ref 98–107)
CO2 SERPL-SCNC: 29 MMOL/L (ref 21–32)
CREAT SERPL-MCNC: 1.9 MG/DL (ref 0.6–1.3)
EOSINOPHIL # BLD AUTO: 0.8 /CMM (ref 0–0.7)
EOSINOPHIL NFR BLD AUTO: 4.8 % (ref 0–6)
EOSINOPHIL NFR BLD MANUAL: 4 % (ref 0–4)
GAMMA GLOB SERPL ELPH-MCNC: 1.7 G/DL (ref 0.4–1.8)
GLOBULIN SER CALC-MCNC: 3.3 G/DL (ref 2.2–3.9)
GLUCOSE SERPL-MCNC: 118 MG/DL (ref 74–106)
HCT VFR BLD AUTO: 26 % (ref 33–45)
HGB BLD-MCNC: 8.6 G/DL (ref 11.5–14.8)
LYMPHOCYTES NFR BLD AUTO: 1.3 /CMM (ref 0.8–4.8)
LYMPHOCYTES NFR BLD AUTO: 7.9 % (ref 20–44)
LYMPHOCYTES NFR BLD MANUAL: 5 % (ref 16–48)
MAGNESIUM SERPL-MCNC: 1.7 MG/DL (ref 1.8–2.4)
MCH RBC QN AUTO: 32 PG (ref 26–33)
MCHC RBC AUTO-ENTMCNC: 34 G/DL (ref 31–36)
MCV RBC AUTO: 94 FL (ref 82–100)
MONOCYTES NFR BLD AUTO: 0.3 /CMM (ref 0.1–1.3)
MONOCYTES NFR BLD AUTO: 1.5 % (ref 2–12)
MONOCYTES NFR BLD MANUAL: 7 % (ref 0–11)
NEUTROPHILS # BLD AUTO: 14.4 /CMM (ref 1.8–8.9)
NEUTROPHILS NFR BLD AUTO: 85.7 % (ref 43–81)
NEUTS SEG NFR BLD MANUAL: 84 % (ref 42–76)
PHOSPHATE SERPL-MCNC: 4 MG/DL (ref 2.5–4.9)
PLATELET # BLD AUTO: 192 /CMM (ref 150–450)
POTASSIUM SERPL-SCNC: 4.3 MMOL/L (ref 3.5–5.1)
PROT SERPL-MCNC: 6.3 G/DL (ref 6.4–8.2)
RBC # BLD AUTO: 2.7 MIL/UL (ref 4–5.2)
RDW COEFFICIENT OF VARIATION: 20.6 (ref 11.5–15)
SODIUM SERPL-SCNC: 149 MMOL/L (ref 136–145)
WBC NRBC COR # BLD AUTO: 16.9 K/UL (ref 4.3–11)

## 2018-02-05 RX ADMIN — CEFEPIME HYDROCHLORIDE SCH MLS/HR: 1 INJECTION, POWDER, FOR SOLUTION INTRAMUSCULAR; INTRAVENOUS at 22:52

## 2018-02-05 RX ADMIN — MUPIROCIN SCH APPLIC: 20 OINTMENT TOPICAL at 20:24

## 2018-02-05 RX ADMIN — AMIODARONE HYDROCHLORIDE SCH MG: 200 TABLET ORAL at 20:23

## 2018-02-05 RX ADMIN — FLUCONAZOLE SCH MG: 100 TABLET ORAL at 19:52

## 2018-02-05 RX ADMIN — Medication PRN ML: at 14:59

## 2018-02-05 RX ADMIN — MUPIROCIN SCH GM: 20 OINTMENT TOPICAL at 09:55

## 2018-02-05 RX ADMIN — DEXTROSE MONOHYDRATE SCH MLS/HR: 50 INJECTION, SOLUTION INTRAVENOUS at 17:07

## 2018-02-05 RX ADMIN — SODIUM CHLORIDE SCH MG: 9 INJECTION, SOLUTION INTRAVENOUS at 09:54

## 2018-02-05 RX ADMIN — Medication SCH GM: at 09:54

## 2018-02-05 NOTE — NUR
TELE RN CLOSING NOTES



PATIENT OBTUNDED, NON-VERBAL, SHILEY 6 TRACH TO VENT, SETTING- VENT SETTINGS AC 12, , 
FIO2 40%, PEEP 5, SPO2 100%. NOT IN ANY IN ANY DISTRESS, RESPIRATION UNLABORED, EQUAL. 
TELEMETRY READS SR HR 89, NO SIGNS OF PAIN, WITH DELMA MIDLINE PATENT AND INTACT. RIGHT 
FEMORAL TLC, PATENT AND INTACT. WITH GTF AT 25ML/HR.  O RESIDUAL. HOB ELEVATED.  SIDE RAILS 
UP AND LOCKED.  BED KEPT AT LOWEST POSITION.  ISOLATION PRECAUTIONS OBSERVED.  MADE 
COMFORTABLE. PRESCRIBED WOUND TREATMENT AND PM CARE DONE. TURNED AND REPOSITIONED Q 2HOURS. 
NO OTHER SIGNIFICANT CHANGE IN CONDITION. ALL NEEDS MET. WILL ENDORSE TO NEXT SHIFT FOR MAISHA.



PER DR. MONTERO [CARDIO ]  WILL HOLD AMIODARONE FOR NOW.

## 2018-02-05 NOTE — NUR
RT END OF THE SHIFT REPORT 

PT. 88 Y OLD FEMALE REMAIN TRACH'D SHILEY # 6 AND ON VENT WITH NOTED SETTINGS, ALARMS ARE 
SET AND FUNCTIONAL.

NO DISTRESS NOTED T/O SHIFT. B/S BILATERALLY RHONCHI. EQUAL CHEST RISE NOTED. SUX'D FOR 
MINIMUM AMT. YELLOWISH SECRETIONS, 

NO CHANGES AND CONTINUE FOR MONITOR AND CARE FOR PT. 

AMBU BAG REMAIN AT THE BEDSIDE. VENT PLUGGED INTO RED OUTLET.

HME CHANGED, REPORT WILL PASS TO PM SHIFT.

-------------------------------------------------------------------------------

Addendum: 02/05/18 at 1810 by LISA CHESTER RT

-------------------------------------------------------------------------------

Amended: Links added.

## 2018-02-05 NOTE — NUR
RN NOTES



PATIENT SEEN AND EXAMINED BY DR MONSALVE, WITH NEW ORDER FOR AMIODARONE 400MG VIA GT TID, 1ST 
DOSE TO START TONIGHT. ORDER READ BACK FOR CLARIFICATION. PHARMACY CALLED, SPOKE TO [facility] 
REGARDING NEW ORDER, MEDICATION WILL BE VERIFIED SHORTLY

## 2018-02-05 NOTE — NUR
TAYO RN AM NOTES



RECEIVED PATIENT OBTUNDED, NON-VERBAL, SHILEY 6 TRACH TO VENT, SETTING- VENT SETTINGS AC 12, 
, FIO2 40%, PEEP 5, SPO2 100%. NOT IN ANY IN ANY DISTRESS, RESPIRATION UNLABORED, 
EQUAL. TELEMETRY READS SR HR 78, NO SIGNS OF PAIN, WITH DELMA MIDLINE PATENT AND INTACT WITH 
AMIODARONE AT 0.5MG/MIN INFUSING. TO CONSUME THEN PO. RIGHT FEMORAL TLC, PATENT AND INTACT. 
WITH GTF AT 25ML/HR.  O RESIDUAL. HOB ELEVATED.  SIDE RAILS UP AND LOCKED.  BED KEPT AT 
LOWEST POSITION.  ISOLATION PRECAUTIONS OBSERVED.  WILL CONTINUE TO MONITOR.

## 2018-02-06 VITALS — DIASTOLIC BLOOD PRESSURE: 63 MMHG | SYSTOLIC BLOOD PRESSURE: 137 MMHG

## 2018-02-06 VITALS — SYSTOLIC BLOOD PRESSURE: 125 MMHG | DIASTOLIC BLOOD PRESSURE: 53 MMHG

## 2018-02-06 VITALS — DIASTOLIC BLOOD PRESSURE: 50 MMHG | SYSTOLIC BLOOD PRESSURE: 132 MMHG

## 2018-02-06 VITALS — DIASTOLIC BLOOD PRESSURE: 54 MMHG | SYSTOLIC BLOOD PRESSURE: 142 MMHG

## 2018-02-06 VITALS — DIASTOLIC BLOOD PRESSURE: 64 MMHG | SYSTOLIC BLOOD PRESSURE: 137 MMHG

## 2018-02-06 VITALS — SYSTOLIC BLOOD PRESSURE: 106 MMHG | DIASTOLIC BLOOD PRESSURE: 52 MMHG

## 2018-02-06 LAB
BASOPHILS # BLD AUTO: 0 /CMM (ref 0–0.2)
BASOPHILS NFR BLD AUTO: 0 % (ref 0–2)
BUN SERPL-MCNC: 60 MG/DL (ref 7–18)
CALCIUM SERPL-MCNC: 7.5 MG/DL (ref 8.5–10.1)
CHLORIDE SERPL-SCNC: 106 MMOL/L (ref 98–107)
CO2 SERPL-SCNC: 27 MMOL/L (ref 21–32)
CREAT SERPL-MCNC: 2.1 MG/DL (ref 0.6–1.3)
EOSINOPHIL # BLD AUTO: 0.8 /CMM (ref 0–0.7)
EOSINOPHIL NFR BLD AUTO: 3.3 % (ref 0–6)
EOSINOPHIL NFR BLD MANUAL: 5 % (ref 0–4)
GLUCOSE SERPL-MCNC: 112 MG/DL (ref 74–106)
HCT VFR BLD AUTO: 25 % (ref 33–45)
HGB BLD-MCNC: 8.3 G/DL (ref 11.5–14.8)
LYMPHOCYTES NFR BLD AUTO: 1.6 /CMM (ref 0.8–4.8)
LYMPHOCYTES NFR BLD AUTO: 6.4 % (ref 20–44)
LYMPHOCYTES NFR BLD MANUAL: 5 % (ref 16–48)
MAGNESIUM SERPL-MCNC: 1.8 MG/DL (ref 1.8–2.4)
MCH RBC QN AUTO: 32 PG (ref 26–33)
MCHC RBC AUTO-ENTMCNC: 34 G/DL (ref 31–36)
MCV RBC AUTO: 94 FL (ref 82–100)
MONOCYTES NFR BLD AUTO: 0.3 /CMM (ref 0.1–1.3)
MONOCYTES NFR BLD AUTO: 1.2 % (ref 2–12)
NEUTROPHILS # BLD AUTO: 22.2 /CMM (ref 1.8–8.9)
NEUTROPHILS NFR BLD AUTO: 89.1 % (ref 43–81)
NEUTS SEG NFR BLD MANUAL: 90 % (ref 42–76)
PHOSPHATE SERPL-MCNC: 4 MG/DL (ref 2.5–4.9)
PLATELET # BLD AUTO: 180 /CMM (ref 150–450)
POTASSIUM SERPL-SCNC: 4.1 MMOL/L (ref 3.5–5.1)
PTH-INTACT SERPL-MCNC: 267 PG/ML (ref 15–65)
RBC # BLD AUTO: 2.6 MIL/UL (ref 4–5.2)
RDW COEFFICIENT OF VARIATION: 20.6 (ref 11.5–15)
SODIUM SERPL-SCNC: 145 MMOL/L (ref 136–145)
WBC NRBC COR # BLD AUTO: 24.9 K/UL (ref 4.3–11)

## 2018-02-06 PROCEDURE — 5A1D70Z PERFORMANCE OF URINARY FILTRATION, INTERMITTENT, LESS THAN 6 HOURS PER DAY: ICD-10-PCS | Performed by: INTERNAL MEDICINE

## 2018-02-06 RX ADMIN — AMIODARONE HYDROCHLORIDE SCH MG: 200 TABLET ORAL at 16:47

## 2018-02-06 RX ADMIN — AMIODARONE HYDROCHLORIDE SCH MG: 200 TABLET ORAL at 08:49

## 2018-02-06 RX ADMIN — CEFEPIME HYDROCHLORIDE SCH MLS/HR: 1 INJECTION, POWDER, FOR SOLUTION INTRAMUSCULAR; INTRAVENOUS at 09:06

## 2018-02-06 RX ADMIN — MUPIROCIN SCH APPLIC: 20 OINTMENT TOPICAL at 08:55

## 2018-02-06 RX ADMIN — MUPIROCIN SCH APPLIC: 20 OINTMENT TOPICAL at 21:14

## 2018-02-06 RX ADMIN — Medication PRN MLS/HR: at 17:39

## 2018-02-06 RX ADMIN — FLUCONAZOLE SCH MG: 100 TABLET ORAL at 08:49

## 2018-02-06 RX ADMIN — MEROPENEM SCH MLS/HR: 500 INJECTION INTRAVENOUS at 21:13

## 2018-02-06 RX ADMIN — Medication SCH GM: at 08:46

## 2018-02-06 RX ADMIN — SODIUM CHLORIDE SCH MG: 9 INJECTION, SOLUTION INTRAVENOUS at 08:43

## 2018-02-06 RX ADMIN — AMIODARONE HYDROCHLORIDE SCH MG: 200 TABLET ORAL at 12:24

## 2018-02-06 NOTE — NUR
TELE RN CLOSING NOTES



PATIENT OBTUNDED, NON-VERBAL, SHILEY 6 TRACH TO VENT, SETTING- VENT SETTINGS AC 12, , 
FIO2 40%, PEEP 5, SPO2 100%. NOT IN ANY IN ANY DISTRESS, RESPIRATION UNLABORED, EQUAL. 
TELEMETRY READS SR HR 89, NO SIGNS OF PAIN, WITH DELMA MIDLINE PATENT AND INTACT. RIGHT 
FEMORAL TLC, PATENT AND INTACT. WITH GTF AT 25ML/HR.  O RESIDUAL. HOB ELEVATED.  SIDE RAILS 
UP AND LOCKED.  BED KEPT AT LOWEST POSITION.  ISOLATION PRECAUTIONS OBSERVED.  MADE 
COMFORTABLE. PRESCRIBED WOUND TREATMENT AND PM CARE DONE. TURNED AND REPOSITIONED Q 2HOURS. 
NO OTHER SIGNIFICANT CHANGE IN CONDITION. ALL NEEDS MET. WILL ENDORSE TO NEXT SHIFT FOR MAISHA.

## 2018-02-06 NOTE — NUR
PT RECEIVED TRACHED ON VENT. NO RESP DISTRESS NOTED. PT TOLERATING VENT SETTINGS. SX'D FOR 
MOD AMT OF THICK YELLOW SECRETIONS. VENT ALARMS SET AND AUDIBLE. AMBU BAG AT BEDSIDE. VENT 
PLUGGED INTO RED OUTLET. WILL CONTINUE TO MONITOR.

-------------------------------------------------------------------------------

Addendum: 02/06/18 at 2038 by ANDREE GRIJALVA RT

-------------------------------------------------------------------------------

Amended: Links added.

## 2018-02-06 NOTE — NUR
PT RECEIVED TRACHED ON MECHANICAL VENT W/ SETTINGS AS PER MD. VENT IN RED OUTLET, VENT 
ALARMS CHECKED AND AUDIBLE. PT SX'ED AND LAVAGED PRN TO MOD AMOUNTS OF THICK PALE YELLOW 
SECRETIONS. TRACH TUBE SECURED, PATENT, CLEAN AND DRY. BREATH SOUNDS EQUAL, DIMINISHED. PLAN 
IS TO CONTINUE CURRENT CARE UNDER MD ORDERS AND MONITOR FOR CHANGES IN STATUS.

-------------------------------------------------------------------------------

Addendum: 02/06/18 at 0739 by BINU TORIBIO RT

-------------------------------------------------------------------------------

Amended: Links added.

## 2018-02-07 VITALS — SYSTOLIC BLOOD PRESSURE: 120 MMHG | DIASTOLIC BLOOD PRESSURE: 59 MMHG

## 2018-02-07 VITALS — DIASTOLIC BLOOD PRESSURE: 53 MMHG | SYSTOLIC BLOOD PRESSURE: 129 MMHG

## 2018-02-07 VITALS — SYSTOLIC BLOOD PRESSURE: 135 MMHG | DIASTOLIC BLOOD PRESSURE: 55 MMHG

## 2018-02-07 VITALS — DIASTOLIC BLOOD PRESSURE: 46 MMHG | SYSTOLIC BLOOD PRESSURE: 116 MMHG

## 2018-02-07 VITALS — SYSTOLIC BLOOD PRESSURE: 102 MMHG | DIASTOLIC BLOOD PRESSURE: 49 MMHG

## 2018-02-07 VITALS — SYSTOLIC BLOOD PRESSURE: 127 MMHG | DIASTOLIC BLOOD PRESSURE: 55 MMHG

## 2018-02-07 LAB
BASOPHILS # BLD AUTO: 0 /CMM (ref 0–0.2)
BASOPHILS NFR BLD AUTO: 0.1 % (ref 0–2)
BUN SERPL-MCNC: 49 MG/DL (ref 7–18)
CALCIUM SERPL-MCNC: 7.6 MG/DL (ref 8.5–10.1)
CHLORIDE SERPL-SCNC: 108 MMOL/L (ref 98–107)
CO2 SERPL-SCNC: 28 MMOL/L (ref 21–32)
CREAT SERPL-MCNC: 1.7 MG/DL (ref 0.6–1.3)
EOSINOPHIL # BLD AUTO: 0 /CMM (ref 0–0.7)
EOSINOPHIL NFR BLD AUTO: 0 % (ref 0–6)
EOSINOPHIL NFR BLD MANUAL: 1 % (ref 0–4)
GLUCOSE SERPL-MCNC: 95 MG/DL (ref 74–106)
HCT VFR BLD AUTO: 24 % (ref 33–45)
HGB BLD-MCNC: 7.8 G/DL (ref 11.5–14.8)
LYMPHOCYTES NFR BLD AUTO: 1.7 /CMM (ref 0.8–4.8)
LYMPHOCYTES NFR BLD AUTO: 5.2 % (ref 20–44)
LYMPHOCYTES NFR BLD MANUAL: 7 % (ref 16–48)
MAGNESIUM SERPL-MCNC: 1.7 MG/DL (ref 1.8–2.4)
MCH RBC QN AUTO: 31 PG (ref 26–33)
MCHC RBC AUTO-ENTMCNC: 33 G/DL (ref 31–36)
MCV RBC AUTO: 95 FL (ref 82–100)
MONOCYTES NFR BLD AUTO: 0.7 /CMM (ref 0.1–1.3)
MONOCYTES NFR BLD AUTO: 2.1 % (ref 2–12)
MONOCYTES NFR BLD MANUAL: 7 % (ref 0–11)
NEUTROPHILS # BLD AUTO: 29.7 /CMM (ref 1.8–8.9)
NEUTROPHILS NFR BLD AUTO: 92.6 % (ref 43–81)
NEUTS SEG NFR BLD MANUAL: 85 % (ref 42–76)
PHOSPHATE SERPL-MCNC: 3.1 MG/DL (ref 2.5–4.9)
PLATELET # BLD AUTO: 139 /CMM (ref 150–450)
POTASSIUM SERPL-SCNC: 4.1 MMOL/L (ref 3.5–5.1)
RBC # BLD AUTO: 2.47 MIL/UL (ref 4–5.2)
RDW COEFFICIENT OF VARIATION: 20.3 (ref 11.5–15)
SODIUM SERPL-SCNC: 147 MMOL/L (ref 136–145)
WBC NRBC COR # BLD AUTO: 32.1 K/UL (ref 4.3–11)

## 2018-02-07 RX ADMIN — Medication PRN ML: at 20:38

## 2018-02-07 RX ADMIN — SODIUM CHLORIDE SCH MG: 9 INJECTION, SOLUTION INTRAVENOUS at 09:19

## 2018-02-07 RX ADMIN — Medication SCH GM: at 09:20

## 2018-02-07 RX ADMIN — Medication PRN EACH: at 20:33

## 2018-02-07 RX ADMIN — FLUCONAZOLE SCH MG: 100 TABLET ORAL at 09:19

## 2018-02-07 RX ADMIN — AMIODARONE HYDROCHLORIDE SCH MG: 200 TABLET ORAL at 13:00

## 2018-02-07 RX ADMIN — MUPIROCIN SCH APPLIC: 20 OINTMENT TOPICAL at 20:33

## 2018-02-07 RX ADMIN — MUPIROCIN SCH APPLIC: 20 OINTMENT TOPICAL at 09:21

## 2018-02-07 RX ADMIN — AMIODARONE HYDROCHLORIDE SCH MG: 200 TABLET ORAL at 09:20

## 2018-02-07 RX ADMIN — MEROPENEM SCH MLS/HR: 500 INJECTION INTRAVENOUS at 20:32

## 2018-02-07 RX ADMIN — MEROPENEM SCH MLS/HR: 500 INJECTION INTRAVENOUS at 08:30

## 2018-02-07 RX ADMIN — AMIODARONE HYDROCHLORIDE SCH MG: 200 TABLET ORAL at 17:05

## 2018-02-07 NOTE — NUR
RN NOTES



INSERTED ALAMO CATHETER PER MD ORDER 16 Mongolian. USED STERILE TECHNIQUE. WITH URINE OUTFLOW. 
URINE AMOUNT NOT ENOUGH AT THIS TIME TO COLLECT URINE CULTURE.

## 2018-02-07 NOTE — NUR
PT RECEIVED TRACH ON MECHANICAL VENTILATOR ON SETTINGS NOTED PER MD ORDER. SUCTIONED LARGE 
AMOUNT OF TAN/YELLOW THICK SECRETIONS. BREATH SOUNDS BILATERAL COARSE. PT AIRWAY PATENT AND 
SECURE. VENT ALARMS SET AND AUDIBLE. SPARE TRACH AT BEDSIDE, AMBU  BAG AT BEDSIDE. WILL 
CONTINUE TO MONITOR PT.

## 2018-02-07 NOTE — NUR
TELE RN AM NOTES



RECEIVED PATIENT OBTUNDED, NON-VERBAL, SHILEY 6 TRACH TO VENT, SETTING- VENT SETTINGS AC 12, 
, FIO2 40%, PEEP 5, SPO2 100%. NOT IN ANY IN ANY DISTRESS, RESPIRATION UNLABORED, 
EQUAL. TELEMETRY READS SR HR 66, NO SIGNS OF PAIN, WITH DELMA MIDLINE PATENT. SITE CLEAR. 
RIGHT FEMORAL TLC, PATENT AND INTACT. WITH GTF AT 25ML/HR.  O RESIDUAL. HOB ELEVATED.  SIDE 
RAILS UP AND LOCKED.  BED KEPT AT LOWEST POSITION.  ISOLATION PRECAUTIONS OBSERVED.  WILL 
CONTINUE TO MONITOR.

## 2018-02-08 VITALS — SYSTOLIC BLOOD PRESSURE: 100 MMHG | DIASTOLIC BLOOD PRESSURE: 58 MMHG

## 2018-02-08 VITALS — SYSTOLIC BLOOD PRESSURE: 112 MMHG | DIASTOLIC BLOOD PRESSURE: 36 MMHG

## 2018-02-08 VITALS — SYSTOLIC BLOOD PRESSURE: 133 MMHG | DIASTOLIC BLOOD PRESSURE: 64 MMHG

## 2018-02-08 VITALS — DIASTOLIC BLOOD PRESSURE: 43 MMHG | SYSTOLIC BLOOD PRESSURE: 103 MMHG

## 2018-02-08 VITALS — DIASTOLIC BLOOD PRESSURE: 53 MMHG | SYSTOLIC BLOOD PRESSURE: 103 MMHG

## 2018-02-08 VITALS — SYSTOLIC BLOOD PRESSURE: 106 MMHG | DIASTOLIC BLOOD PRESSURE: 45 MMHG

## 2018-02-08 LAB
BASOPHILS # BLD AUTO: 0 /CMM (ref 0–0.2)
BASOPHILS NFR BLD AUTO: 0 % (ref 0–2)
BUN SERPL-MCNC: 57 MG/DL (ref 7–18)
CALCIUM SERPL-MCNC: 7.6 MG/DL (ref 8.5–10.1)
CHLORIDE SERPL-SCNC: 109 MMOL/L (ref 98–107)
CO2 SERPL-SCNC: 28 MMOL/L (ref 21–32)
CREAT SERPL-MCNC: 1.9 MG/DL (ref 0.6–1.3)
EOSINOPHIL # BLD AUTO: 0.8 /CMM (ref 0–0.7)
EOSINOPHIL NFR BLD AUTO: 2.8 % (ref 0–6)
EOSINOPHIL NFR BLD MANUAL: 2 % (ref 0–4)
GLUCOSE SERPL-MCNC: 109 MG/DL (ref 74–106)
HCT VFR BLD AUTO: 23 % (ref 33–45)
HGB BLD-MCNC: 7.7 G/DL (ref 11.5–14.8)
LYMPHOCYTES NFR BLD AUTO: 0.7 /CMM (ref 0.8–4.8)
LYMPHOCYTES NFR BLD AUTO: 2.6 % (ref 20–44)
LYMPHOCYTES NFR BLD MANUAL: 2 % (ref 16–48)
MAGNESIUM SERPL-MCNC: 1.9 MG/DL (ref 1.8–2.4)
MCH RBC QN AUTO: 32 PG (ref 26–33)
MCHC RBC AUTO-ENTMCNC: 34 G/DL (ref 31–36)
MCV RBC AUTO: 94 FL (ref 82–100)
MONOCYTES NFR BLD AUTO: 0.8 /CMM (ref 0.1–1.3)
MONOCYTES NFR BLD AUTO: 3 % (ref 2–12)
MONOCYTES NFR BLD MANUAL: 2 % (ref 0–11)
NEUTROPHILS # BLD AUTO: 24.9 /CMM (ref 1.8–8.9)
NEUTROPHILS NFR BLD AUTO: 91.6 % (ref 43–81)
NEUTS BAND NFR BLD MANUAL: 3 % (ref 0–5)
NEUTS SEG NFR BLD MANUAL: 91 % (ref 42–76)
PHOSPHATE SERPL-MCNC: 3.5 MG/DL (ref 2.5–4.9)
PLATELET # BLD AUTO: 155 /CMM (ref 150–450)
POTASSIUM SERPL-SCNC: 3.8 MMOL/L (ref 3.5–5.1)
RBC # BLD AUTO: 2.44 MIL/UL (ref 4–5.2)
RDW COEFFICIENT OF VARIATION: 20.3 (ref 11.5–15)
SODIUM SERPL-SCNC: 147 MMOL/L (ref 136–145)
WBC NRBC COR # BLD AUTO: 27.1 K/UL (ref 4.3–11)

## 2018-02-08 PROCEDURE — 5A1D70Z PERFORMANCE OF URINARY FILTRATION, INTERMITTENT, LESS THAN 6 HOURS PER DAY: ICD-10-PCS | Performed by: INTERNAL MEDICINE

## 2018-02-08 RX ADMIN — Medication PRN EACH: at 05:05

## 2018-02-08 RX ADMIN — Medication SCH GM: at 09:02

## 2018-02-08 RX ADMIN — AMIODARONE HYDROCHLORIDE SCH MG: 200 TABLET ORAL at 17:00

## 2018-02-08 RX ADMIN — MUPIROCIN SCH APPLIC: 20 OINTMENT TOPICAL at 09:02

## 2018-02-08 RX ADMIN — MEROPENEM SCH MLS/HR: 500 INJECTION INTRAVENOUS at 20:50

## 2018-02-08 RX ADMIN — MEROPENEM SCH MLS/HR: 500 INJECTION INTRAVENOUS at 09:01

## 2018-02-08 RX ADMIN — SODIUM CHLORIDE SCH MG: 9 INJECTION, SOLUTION INTRAVENOUS at 08:56

## 2018-02-08 RX ADMIN — FLUCONAZOLE SCH MG: 100 TABLET ORAL at 08:56

## 2018-02-08 RX ADMIN — AMIODARONE HYDROCHLORIDE SCH MG: 200 TABLET ORAL at 09:00

## 2018-02-08 RX ADMIN — MUPIROCIN SCH APPLIC: 20 OINTMENT TOPICAL at 20:51

## 2018-02-08 RX ADMIN — AMIODARONE HYDROCHLORIDE SCH MG: 200 TABLET ORAL at 12:21

## 2018-02-08 NOTE — NUR
RN INITIAL NOTES



RECEIVED PT IN BED, OBTUNDED. PT CONNECTED TO VENT, AC: 12, TV: 400, FIO2: 40%, PEEP 5. O2 
%, NO SIGNS OF DISTRESS NOTED. DELMA MIDLINE IS INTACT AND SL. G TUBE INTACT AND 
RUNNING NOVASOURCE @ 25ML/HR. SAFETY MEASURES ARE IN PLACE, CALL LIGHT IS IN REACH. BED IN 
THE LOWEST LOCKED POSITION. I WILL CONTINUE TO MONITOR.

## 2018-02-08 NOTE — NUR
RN NOTES

PT IS LAYING DOWN IN BED, OBTUNDED. PT CONNECTED TO VENT, AC: 12, TV: 400, FIO2: 40%, PEEP 
5. O2 %, NO SIGNS OF DISTRESS NOTED. DELMA MIDLINE IS INTACT AND SL. TELE MONITOR SHOWS 
SINUS RHYTHM. HD DONE TODAY WITH NO OUTPUT AND HD CATH REMOVED, PER MD ORDER. G TUBE INTACT 
AND RUNNING NOVASOURCE @ 25ML/HR. WOUND CARE PROVIDED, PT KEPT CLEAN AND DRY. SAFETY 
MEASURES ARE IN PLACE, CALL LIGHT IS IN REACH. WILL ENDORSE TO NIGHT SHIFT RN FOR CONTINUITY 
OF CARE.

## 2018-02-08 NOTE — NUR
PT RCVD ON VENT WITH NOTED SETTINGS. VENTS PLUGGED INTO RED OUTLET, VENT ALARM WORKING AND 
AUDIBLE. SUCTIONED MODERATE   AMOUNT OF YELLOW THICK SECRETIONS . BILATERAL BS NOTED, NO 
RESPIRATORY DISTRESS NOTED AT THIS TIME. WILL CONTINUE TO MONITOR THE PT.

## 2018-02-08 NOTE — NUR
RN NOTES

PT IS RESTING COMFORTABLY IN BED WITH NO SIGNS OF DISTRESS NOTED. PT CONNECTED TO VENT, ALL 
SETTINGS ARE ACCURATE. PT CONNECTED TO GT FEEDING, RUNNING AT 25ML/HR. DELMA MIDLINE INTACT 
AND SL. SAFETY MEASURES ARE IN PLACE, CALL LIGHT IS IN REACH. WILL CONTINUE TO MONITOR.

## 2018-02-09 VITALS — SYSTOLIC BLOOD PRESSURE: 122 MMHG | DIASTOLIC BLOOD PRESSURE: 86 MMHG

## 2018-02-09 VITALS — DIASTOLIC BLOOD PRESSURE: 51 MMHG | SYSTOLIC BLOOD PRESSURE: 123 MMHG

## 2018-02-09 VITALS — SYSTOLIC BLOOD PRESSURE: 138 MMHG | DIASTOLIC BLOOD PRESSURE: 53 MMHG

## 2018-02-09 VITALS — DIASTOLIC BLOOD PRESSURE: 69 MMHG | SYSTOLIC BLOOD PRESSURE: 136 MMHG

## 2018-02-09 VITALS — SYSTOLIC BLOOD PRESSURE: 117 MMHG | DIASTOLIC BLOOD PRESSURE: 68 MMHG

## 2018-02-09 VITALS — SYSTOLIC BLOOD PRESSURE: 110 MMHG | DIASTOLIC BLOOD PRESSURE: 71 MMHG

## 2018-02-09 LAB
BUN SERPL-MCNC: 45 MG/DL (ref 7–18)
CALCIUM SERPL-MCNC: 7.5 MG/DL (ref 8.5–10.1)
CHLORIDE SERPL-SCNC: 107 MMOL/L (ref 98–107)
CO2 SERPL-SCNC: 28 MMOL/L (ref 21–32)
CREAT SERPL-MCNC: 1.7 MG/DL (ref 0.6–1.3)
GLUCOSE SERPL-MCNC: 97 MG/DL (ref 74–106)
POTASSIUM SERPL-SCNC: 4.1 MMOL/L (ref 3.5–5.1)
SODIUM SERPL-SCNC: 143 MMOL/L (ref 136–145)

## 2018-02-09 RX ADMIN — Medication SCH GM: at 08:10

## 2018-02-09 RX ADMIN — MUPIROCIN SCH APPLIC: 20 OINTMENT TOPICAL at 08:10

## 2018-02-09 RX ADMIN — AMIODARONE HYDROCHLORIDE SCH MG: 200 TABLET ORAL at 16:29

## 2018-02-09 RX ADMIN — FLUCONAZOLE SCH MG: 100 TABLET ORAL at 08:09

## 2018-02-09 RX ADMIN — MUPIROCIN SCH APPLIC: 20 OINTMENT TOPICAL at 21:22

## 2018-02-09 RX ADMIN — AMIODARONE HYDROCHLORIDE SCH MG: 200 TABLET ORAL at 08:09

## 2018-02-09 RX ADMIN — AMIODARONE HYDROCHLORIDE SCH MG: 200 TABLET ORAL at 13:23

## 2018-02-09 RX ADMIN — SODIUM CHLORIDE SCH MG: 9 INJECTION, SOLUTION INTRAVENOUS at 08:07

## 2018-02-09 RX ADMIN — MEROPENEM SCH MLS/HR: 500 INJECTION INTRAVENOUS at 21:21

## 2018-02-09 RX ADMIN — MEROPENEM SCH MLS/HR: 500 INJECTION INTRAVENOUS at 08:07

## 2018-02-09 NOTE — NUR
TELE RN NOTES



NO ACUTE CHANGES NOTED DURING THE SHIFT. DUE MEDS GIVEN. PROVIDED COMFORT AND SAFETY. 
ENDORSED TO THE PM NURSE FOR MAISHA.

## 2018-02-09 NOTE — NUR
TELE RN NOTES



RECEIVED PT ON BED SLEEPING. OBTUNDED. ON TELE MONITOR SR 76. ON [facility] VENT SETTING 
SATURATING WELL. IV ACCESS ON DELMA MIDLINE HL. HEAD OF BED ELEVATED. SIDE RAILS UP. CALL 
LIGHT WITHIN REACH. WILL CONTINUE TO MONITOR PT CLOSELY.

## 2018-02-09 NOTE — NUR
RCVD PT ON VENT WITH NOTED SETTINGS. VENTS PLUGGED INTO RED OUTLET, VENT ALARM WORKING AND 
AUDIBLE. SUCTIONED MODERATE   AMOUNT OF YELLOW THICK SECRETIONS . BILATERAL BS NOTED, NO 
RESPIRATORY DISTRESS NOTED AT THIS TIME. WILL CONTINUE TO MONITOR THE PT.

## 2018-02-09 NOTE — NUR
RN CLOSING NOTES



NO CHANGE IN PT CONDITION OVER SHIFT, PT IN BED OBTUNDED. PT CONNECTED TO VENT, AC: 12, TV: 
400, FIO2: 40%, PEEP 5. O2 %, NO SIGNS OF DISTRESS NOTED. DELMA MIDLINE IS INTACT AND 
SL. G TUBE INTACT AND RUNNING NOVASOURCE @ 25ML/HR. SAFETY MEASURES ARE IN PLACE, CALL LIGHT 
IS IN REACH. BED IN THE LOWEST LOCKED POSITION. I WILL ENDORSE TO AM RN.

## 2018-02-10 VITALS — SYSTOLIC BLOOD PRESSURE: 128 MMHG | DIASTOLIC BLOOD PRESSURE: 64 MMHG

## 2018-02-10 VITALS — DIASTOLIC BLOOD PRESSURE: 48 MMHG | SYSTOLIC BLOOD PRESSURE: 125 MMHG

## 2018-02-10 VITALS — DIASTOLIC BLOOD PRESSURE: 53 MMHG | SYSTOLIC BLOOD PRESSURE: 125 MMHG

## 2018-02-10 VITALS — SYSTOLIC BLOOD PRESSURE: 129 MMHG | DIASTOLIC BLOOD PRESSURE: 63 MMHG

## 2018-02-10 VITALS — SYSTOLIC BLOOD PRESSURE: 141 MMHG | DIASTOLIC BLOOD PRESSURE: 45 MMHG

## 2018-02-10 VITALS — DIASTOLIC BLOOD PRESSURE: 73 MMHG | SYSTOLIC BLOOD PRESSURE: 146 MMHG

## 2018-02-10 LAB
BUN SERPL-MCNC: 54 MG/DL (ref 7–18)
CALCIUM SERPL-MCNC: 7.8 MG/DL (ref 8.5–10.1)
CHLORIDE SERPL-SCNC: 104 MMOL/L (ref 98–107)
CO2 SERPL-SCNC: 26 MMOL/L (ref 21–32)
CREAT SERPL-MCNC: 2 MG/DL (ref 0.6–1.3)
GLUCOSE SERPL-MCNC: 120 MG/DL (ref 74–106)
POTASSIUM SERPL-SCNC: 4.3 MMOL/L (ref 3.5–5.1)
SODIUM SERPL-SCNC: 141 MMOL/L (ref 136–145)

## 2018-02-10 RX ADMIN — AMIODARONE HYDROCHLORIDE SCH MG: 200 TABLET ORAL at 16:18

## 2018-02-10 RX ADMIN — SODIUM CHLORIDE SCH MG: 9 INJECTION, SOLUTION INTRAVENOUS at 08:14

## 2018-02-10 RX ADMIN — MUPIROCIN SCH APPLIC: 20 OINTMENT TOPICAL at 20:57

## 2018-02-10 RX ADMIN — AMIODARONE HYDROCHLORIDE SCH MG: 200 TABLET ORAL at 08:14

## 2018-02-10 RX ADMIN — MEROPENEM SCH MLS/HR: 500 INJECTION INTRAVENOUS at 08:14

## 2018-02-10 RX ADMIN — MUPIROCIN SCH APPLIC: 20 OINTMENT TOPICAL at 08:16

## 2018-02-10 RX ADMIN — Medication SCH APPLIC: at 08:16

## 2018-02-10 RX ADMIN — MEROPENEM SCH MLS/HR: 500 INJECTION INTRAVENOUS at 20:55

## 2018-02-10 RX ADMIN — Medication PRN ML: at 04:08

## 2018-02-10 RX ADMIN — FLUCONAZOLE SCH MG: 100 TABLET ORAL at 08:14

## 2018-02-10 RX ADMIN — AMIODARONE HYDROCHLORIDE SCH MG: 200 TABLET ORAL at 12:31

## 2018-02-10 NOTE — NUR
PT RECEIVED ON VENT VIA CHARTED SETTINGS AND ROUTE. ALARMS ARE SET AND FUNCTIONAL. 
DISCONNECT ALARMS CHECKED. NO DISTRESS NOTED. AMBU BAG AT THE BEDSIDE. VENT PLUGGED INTO RED 
OUTLET.

-------------------------------------------------------------------------------

Addendum: 02/10/18 at 2146 by KARTIK KIM RT

-------------------------------------------------------------------------------

Amended: Links added.

## 2018-02-10 NOTE — NUR
RN NOTE

PT REMAINED STABLE THOUGH SHIFT, PT HAD 1 BM, LIQUID, YELLOW/GREEN COLOR. PT HAD RESIDUALS 
AT 1600 180ML, AND AT 1820 = 250ML WITH THE FEEDING ON HOLD. SAFETY MEASURES IMPLEMENTED. 
WILL ENDORSE TO NIGHT SHIFT NURSE.

## 2018-02-10 NOTE — NUR
RN INITIAL NOTES



RECEIVED PT IN BED, OBTUNDED. PT CONNECTED TO VENT, AC: 12, TV: 400, FIO2: 40%, PEEP 5. O2 
%, NO SIGNS OF DISTRESS NOTED. DELMA MIDLINE IS INTACT AND SL. G TUBE INTACT TUBE FEED 
HELD . SAFETY MEASURES ARE IN PLACE, CALL LIGHT IS IN REACH. BED IN THE LOWEST LOCKED 
POSITION. I WILL CONTINUE TO MONITOR.

## 2018-02-10 NOTE — NUR
RT END OF THE SHIFT REPORT 

PT. 88 Y OLD FEMALE REMAIN TRACH'D SHILEY # 6 AND ON VENT WITH NOTED SETTINGS, ALARMS ARE 
SET AND FUNCTIONAL.

NO DISTRESS NOTED T/O SHIFT. B/S BILATERALLY RHONCHI. EQUAL CHEST RISE NOTED. SUX'D FOR 
MINIMUM AMT. YELLOWISH SECRETIONS, 

NO CHANGES AND CONTINUE FOR MONITOR AND CARE FOR PT. 

AMBU BAG REMAIN AT THE BEDSIDE. VENT PLUGGED INTO RED OUTLET.

HME CHANGED, REPORT WILL PASS TO PM SHIFT.

-------------------------------------------------------------------------------

Addendum: 02/10/18 at 1813 by LISA CHESTER RT

-------------------------------------------------------------------------------

Amended: Links added.

## 2018-02-11 VITALS — DIASTOLIC BLOOD PRESSURE: 32 MMHG | SYSTOLIC BLOOD PRESSURE: 132 MMHG

## 2018-02-11 VITALS — SYSTOLIC BLOOD PRESSURE: 127 MMHG | DIASTOLIC BLOOD PRESSURE: 51 MMHG

## 2018-02-11 VITALS — DIASTOLIC BLOOD PRESSURE: 45 MMHG | SYSTOLIC BLOOD PRESSURE: 135 MMHG

## 2018-02-11 VITALS — SYSTOLIC BLOOD PRESSURE: 129 MMHG | DIASTOLIC BLOOD PRESSURE: 58 MMHG

## 2018-02-11 VITALS — DIASTOLIC BLOOD PRESSURE: 62 MMHG | SYSTOLIC BLOOD PRESSURE: 132 MMHG

## 2018-02-11 VITALS — SYSTOLIC BLOOD PRESSURE: 122 MMHG | DIASTOLIC BLOOD PRESSURE: 100 MMHG

## 2018-02-11 VITALS — DIASTOLIC BLOOD PRESSURE: 43 MMHG | SYSTOLIC BLOOD PRESSURE: 129 MMHG

## 2018-02-11 VITALS — DIASTOLIC BLOOD PRESSURE: 76 MMHG | SYSTOLIC BLOOD PRESSURE: 127 MMHG

## 2018-02-11 VITALS — SYSTOLIC BLOOD PRESSURE: 140 MMHG | DIASTOLIC BLOOD PRESSURE: 53 MMHG

## 2018-02-11 VITALS — DIASTOLIC BLOOD PRESSURE: 54 MMHG | SYSTOLIC BLOOD PRESSURE: 138 MMHG

## 2018-02-11 VITALS — SYSTOLIC BLOOD PRESSURE: 136 MMHG | DIASTOLIC BLOOD PRESSURE: 62 MMHG

## 2018-02-11 VITALS — SYSTOLIC BLOOD PRESSURE: 140 MMHG | DIASTOLIC BLOOD PRESSURE: 32 MMHG

## 2018-02-11 VITALS — SYSTOLIC BLOOD PRESSURE: 138 MMHG | DIASTOLIC BLOOD PRESSURE: 64 MMHG

## 2018-02-11 LAB
BASOPHILS # BLD AUTO: 0 /CMM (ref 0–0.2)
BASOPHILS NFR BLD AUTO: 0 % (ref 0–2)
BUN SERPL-MCNC: 59 MG/DL (ref 7–18)
CALCIUM SERPL-MCNC: 7.5 MG/DL (ref 8.5–10.1)
CHLORIDE SERPL-SCNC: 104 MMOL/L (ref 98–107)
CO2 SERPL-SCNC: 25 MMOL/L (ref 21–32)
CREAT SERPL-MCNC: 2.2 MG/DL (ref 0.6–1.3)
EOSINOPHIL # BLD AUTO: 0.7 /CMM (ref 0–0.7)
EOSINOPHIL NFR BLD AUTO: 3.7 % (ref 0–6)
GLUCOSE SERPL-MCNC: 85 MG/DL (ref 74–106)
HCT VFR BLD AUTO: 20 % (ref 33–45)
HGB BLD-MCNC: 6.9 G/DL (ref 11.5–14.8)
LYMPHOCYTES NFR BLD AUTO: 0.7 /CMM (ref 0.8–4.8)
LYMPHOCYTES NFR BLD AUTO: 3.6 % (ref 20–44)
LYMPHOCYTES NFR BLD MANUAL: 9 % (ref 16–48)
MAGNESIUM SERPL-MCNC: 1.9 MG/DL (ref 1.8–2.4)
MCH RBC QN AUTO: 32 PG (ref 26–33)
MCHC RBC AUTO-ENTMCNC: 34 G/DL (ref 31–36)
MCV RBC AUTO: 94 FL (ref 82–100)
MONOCYTES NFR BLD AUTO: 0.7 /CMM (ref 0.1–1.3)
MONOCYTES NFR BLD AUTO: 3.6 % (ref 2–12)
NEUTROPHILS # BLD AUTO: 17.8 /CMM (ref 1.8–8.9)
NEUTROPHILS NFR BLD AUTO: 89.1 % (ref 43–81)
NEUTS BAND NFR BLD MANUAL: 8 % (ref 0–5)
NEUTS SEG NFR BLD MANUAL: 83 % (ref 42–76)
PHOSPHATE SERPL-MCNC: 4 MG/DL (ref 2.5–4.9)
PLATELET # BLD AUTO: 197 /CMM (ref 150–450)
POTASSIUM SERPL-SCNC: 4.5 MMOL/L (ref 3.5–5.1)
RBC # BLD AUTO: 2.16 MIL/UL (ref 4–5.2)
RDW COEFFICIENT OF VARIATION: 19.8 (ref 11.5–15)
SODIUM SERPL-SCNC: 140 MMOL/L (ref 136–145)
WBC NRBC COR # BLD AUTO: 20 K/UL (ref 4.3–11)

## 2018-02-11 RX ADMIN — MUPIROCIN SCH APPLIC: 20 OINTMENT TOPICAL at 20:08

## 2018-02-11 RX ADMIN — MUPIROCIN SCH APPLIC: 20 OINTMENT TOPICAL at 08:52

## 2018-02-11 RX ADMIN — AMIODARONE HYDROCHLORIDE SCH MG: 200 TABLET ORAL at 12:55

## 2018-02-11 RX ADMIN — MEROPENEM SCH MLS/HR: 500 INJECTION INTRAVENOUS at 19:53

## 2018-02-11 RX ADMIN — MEROPENEM SCH MLS/HR: 500 INJECTION INTRAVENOUS at 08:50

## 2018-02-11 RX ADMIN — AMIODARONE HYDROCHLORIDE SCH MG: 200 TABLET ORAL at 08:51

## 2018-02-11 RX ADMIN — Medication SCH TAB: at 08:50

## 2018-02-11 RX ADMIN — SODIUM CHLORIDE SCH MG: 9 INJECTION, SOLUTION INTRAVENOUS at 08:50

## 2018-02-11 RX ADMIN — Medication SCH APPLIC: at 08:51

## 2018-02-11 RX ADMIN — AMIODARONE HYDROCHLORIDE SCH MG: 200 TABLET ORAL at 16:55

## 2018-02-11 RX ADMIN — FLUCONAZOLE SCH MG: 100 TABLET ORAL at 08:50

## 2018-02-11 NOTE — NUR
PT RECEIVED ON VENT VIA CHARTED SETTINGS AND ROUTE. ALARMS ARE SET AND FUNCTIONAL. 
DISCONNECT ALARMS CHECKED. NO DISTRESS NOTED. AMBU BAG AT THE BEDSIDE. VENT PLUGGED INTO RED 
OUTLET.

-------------------------------------------------------------------------------

Addendum: 02/11/18 at 0945 by HOUSTON HUNTER RT

-------------------------------------------------------------------------------

Amended: Links added.

## 2018-02-11 NOTE — NUR
RN NOTES 



RELAYED LAB RESULT Hgb=6.9 TO DR FERMIN WITH ORDER TO TRANSFUSE 1 UNIT PRBC. ORDER NOTED AND 
CARRIED OUT.

## 2018-02-11 NOTE — NUR
RN NOTES 



PATIENT RECEIVED 1 UNIT PRBC, NO ADVERSE REACTION NOTED. V/S STABLE. BREATHING IS NORMAL, 
EVEN AND UNLABORED. NO SOB NOTED. WILL CONT TO MONITOR.

## 2018-02-11 NOTE — NUR
RN NOTES 



PATIENT ENDORSED TO NEXT SHIFT IN STABLE CONDITION WITH BREATHING NORMAL, EVEN AND 
UNLABORED. NO SOB NOTED. NO ACUTE DISTRESS NOTED. KEPT CLEAN, DRY AND COMFORTABLE. ALL NEEDS 
ATTENDED. SAFETY MEASURE OBSERVED. CALL LIGHT WITH IN REACH. WILL CONT TO MONITOR.

## 2018-02-11 NOTE — NUR
RN NOTES 



RECEIVED PATIENT ON [facility] VENT WITH BREATHING NORMAL, EVEN AND UNLABORED. NO SOB NOTED. NO 
ACUTE DISTRESS NOTED. VENT SETTING REVIEWED AND VERIFIED. TOLERATED WELL. AFEBRILE. TELE 
MONITOR REVEALS SR, HR=68. DELMA MIDLINE LINE IS PATENT AND INTACT. ON GT FEED. POSITIVE 
PLACEMENT. ASPIRATION PRECAUTION TAKEN. HOB ELEVATED. KEPT CLEAN, DRY AND COMFORTABLE. ALL 
NEEDS ATTENDED. SAFETY MEASURE OBSERVED. CALL LIGHT WITH IN REACH. WILL CONT TO MONITOR.

## 2018-02-12 VITALS — SYSTOLIC BLOOD PRESSURE: 99 MMHG | DIASTOLIC BLOOD PRESSURE: 54 MMHG

## 2018-02-12 VITALS — SYSTOLIC BLOOD PRESSURE: 102 MMHG | DIASTOLIC BLOOD PRESSURE: 60 MMHG

## 2018-02-12 VITALS — DIASTOLIC BLOOD PRESSURE: 70 MMHG | SYSTOLIC BLOOD PRESSURE: 150 MMHG

## 2018-02-12 VITALS — DIASTOLIC BLOOD PRESSURE: 76 MMHG | SYSTOLIC BLOOD PRESSURE: 122 MMHG

## 2018-02-12 VITALS — DIASTOLIC BLOOD PRESSURE: 55 MMHG | SYSTOLIC BLOOD PRESSURE: 99 MMHG

## 2018-02-12 VITALS — SYSTOLIC BLOOD PRESSURE: 124 MMHG | DIASTOLIC BLOOD PRESSURE: 68 MMHG

## 2018-02-12 LAB
BASOPHILS # BLD AUTO: 0 /CMM (ref 0–0.2)
BASOPHILS NFR BLD AUTO: 0.1 % (ref 0–2)
BUN SERPL-MCNC: 71 MG/DL (ref 7–18)
CALCIUM SERPL-MCNC: 7.6 MG/DL (ref 8.5–10.1)
CHLORIDE SERPL-SCNC: 107 MMOL/L (ref 98–107)
CO2 SERPL-SCNC: 26 MMOL/L (ref 21–32)
CREAT SERPL-MCNC: 2.4 MG/DL (ref 0.6–1.3)
EOSINOPHIL # BLD AUTO: 0.7 /CMM (ref 0–0.7)
EOSINOPHIL NFR BLD AUTO: 3.2 % (ref 0–6)
GLUCOSE SERPL-MCNC: 118 MG/DL (ref 74–106)
HCT VFR BLD AUTO: 26 % (ref 33–45)
HGB BLD-MCNC: 8.9 G/DL (ref 11.5–14.8)
LYMPHOCYTES NFR BLD AUTO: 1.1 /CMM (ref 0.8–4.8)
LYMPHOCYTES NFR BLD AUTO: 5.1 % (ref 20–44)
MAGNESIUM SERPL-MCNC: 1.9 MG/DL (ref 1.8–2.4)
MCH RBC QN AUTO: 31 PG (ref 26–33)
MCHC RBC AUTO-ENTMCNC: 34 G/DL (ref 31–36)
MCV RBC AUTO: 91 FL (ref 82–100)
MONOCYTES NFR BLD AUTO: 0.4 /CMM (ref 0.1–1.3)
MONOCYTES NFR BLD AUTO: 2 % (ref 2–12)
NEUTROPHILS # BLD AUTO: 19.2 /CMM (ref 1.8–8.9)
NEUTROPHILS NFR BLD AUTO: 89.6 % (ref 43–81)
PHOSPHATE SERPL-MCNC: 4.5 MG/DL (ref 2.5–4.9)
PLATELET # BLD AUTO: 194 /CMM (ref 150–450)
POTASSIUM SERPL-SCNC: 4.9 MMOL/L (ref 3.5–5.1)
RBC # BLD AUTO: 2.87 MIL/UL (ref 4–5.2)
RDW COEFFICIENT OF VARIATION: 20.1 (ref 11.5–15)
SODIUM SERPL-SCNC: 143 MMOL/L (ref 136–145)
WBC NRBC COR # BLD AUTO: 21.4 K/UL (ref 4.3–11)

## 2018-02-12 PROCEDURE — 5A1D70Z PERFORMANCE OF URINARY FILTRATION, INTERMITTENT, LESS THAN 6 HOURS PER DAY: ICD-10-PCS | Performed by: INTERNAL MEDICINE

## 2018-02-12 PROCEDURE — 06HM33Z INSERTION OF INFUSION DEVICE INTO RIGHT FEMORAL VEIN, PERCUTANEOUS APPROACH: ICD-10-PCS | Performed by: THORACIC SURGERY (CARDIOTHORACIC VASCULAR SURGERY)

## 2018-02-12 PROCEDURE — B54BZZA ULTRASONOGRAPHY OF RIGHT LOWER EXTREMITY VEINS, GUIDANCE: ICD-10-PCS | Performed by: THORACIC SURGERY (CARDIOTHORACIC VASCULAR SURGERY)

## 2018-02-12 RX ADMIN — SODIUM CHLORIDE SCH MG: 9 INJECTION, SOLUTION INTRAVENOUS at 08:48

## 2018-02-12 RX ADMIN — AMIODARONE HYDROCHLORIDE SCH MG: 200 TABLET ORAL at 12:51

## 2018-02-12 RX ADMIN — Medication SCH MLS/HR: at 23:22

## 2018-02-12 RX ADMIN — Medication SCH MLS/HR: at 10:26

## 2018-02-12 RX ADMIN — SODIUM CHLORIDE SCH MLS/HR: 9 INJECTION, SOLUTION INTRAVENOUS at 21:56

## 2018-02-12 RX ADMIN — MUPIROCIN SCH APPLIC: 20 OINTMENT TOPICAL at 08:49

## 2018-02-12 RX ADMIN — MEROPENEM SCH MLS/HR: 500 INJECTION INTRAVENOUS at 21:07

## 2018-02-12 RX ADMIN — AMIODARONE HYDROCHLORIDE SCH MG: 200 TABLET ORAL at 17:40

## 2018-02-12 RX ADMIN — AMIODARONE HYDROCHLORIDE SCH MG: 200 TABLET ORAL at 08:48

## 2018-02-12 RX ADMIN — MUPIROCIN SCH APPLIC: 20 OINTMENT TOPICAL at 21:10

## 2018-02-12 RX ADMIN — Medication SCH APPLIC: at 08:49

## 2018-02-12 RX ADMIN — Medication SCH TAB: at 08:48

## 2018-02-12 RX ADMIN — FLUCONAZOLE SCH MG: 100 TABLET ORAL at 08:48

## 2018-02-12 RX ADMIN — MEROPENEM SCH MLS/HR: 500 INJECTION INTRAVENOUS at 08:48

## 2018-02-12 NOTE — NUR
RT NOTE:

PATIENT RECEIVED TRACHED ON  VENT. ALARMS VERIFIED AND AUDIBLE. SUCTIONED AND LAVAGED 
SMALL-MODERATE AMOUNT OF THICK TAN SECRETIONS. VENT PLUGGED INTO RED OUTLET. AMBU BAG AT 
[facility].

## 2018-02-12 NOTE — NUR
RN NOTES 



PATIENT IN BED RESTING NO SOB OR ACUTE DISTRESS NOTED. PATIENT VENT DEPENDENT, VENT SETTING 
NOTED. PERIPHERAL IV INTACT PATENT. BED IN LOW LOCKED POSITION. CALL LIGHT WITHIN REACH. 
WILL CONTINUE TO MONITOR.

## 2018-02-12 NOTE — NUR
RN NOTES 



PATIENT IN BED RESTING. ALL DUE MEDICATIONS ADMINISTERED. ALL NEEDS MET. NO BLEEDING NOTED 
AT INSERTION SITE OF HD CATHETER. WILL ENDORSE TO PM SHIFT.

## 2018-02-13 VITALS — SYSTOLIC BLOOD PRESSURE: 145 MMHG | DIASTOLIC BLOOD PRESSURE: 98 MMHG

## 2018-02-13 VITALS — DIASTOLIC BLOOD PRESSURE: 70 MMHG | SYSTOLIC BLOOD PRESSURE: 117 MMHG

## 2018-02-13 VITALS — SYSTOLIC BLOOD PRESSURE: 126 MMHG | DIASTOLIC BLOOD PRESSURE: 37 MMHG

## 2018-02-13 VITALS — DIASTOLIC BLOOD PRESSURE: 45 MMHG | SYSTOLIC BLOOD PRESSURE: 129 MMHG

## 2018-02-13 VITALS — DIASTOLIC BLOOD PRESSURE: 54 MMHG | SYSTOLIC BLOOD PRESSURE: 126 MMHG

## 2018-02-13 VITALS — SYSTOLIC BLOOD PRESSURE: 133 MMHG | DIASTOLIC BLOOD PRESSURE: 38 MMHG

## 2018-02-13 VITALS — DIASTOLIC BLOOD PRESSURE: 51 MMHG | SYSTOLIC BLOOD PRESSURE: 141 MMHG

## 2018-02-13 LAB
BASOPHILS # BLD AUTO: 0 /CMM (ref 0–0.2)
BASOPHILS NFR BLD AUTO: 0.2 % (ref 0–2)
BUN SERPL-MCNC: 57 MG/DL (ref 7–18)
CALCIUM SERPL-MCNC: 7.2 MG/DL (ref 8.5–10.1)
CHLORIDE SERPL-SCNC: 103 MMOL/L (ref 98–107)
CO2 SERPL-SCNC: 24 MMOL/L (ref 21–32)
CREAT SERPL-MCNC: 2.1 MG/DL (ref 0.6–1.3)
EOSINOPHIL # BLD AUTO: 1.1 /CMM (ref 0–0.7)
EOSINOPHIL NFR BLD AUTO: 5.4 % (ref 0–6)
GLUCOSE SERPL-MCNC: 102 MG/DL (ref 74–106)
HCT VFR BLD AUTO: 24 % (ref 33–45)
HGB BLD-MCNC: 8.1 G/DL (ref 11.5–14.8)
LYMPHOCYTES NFR BLD AUTO: 0.7 /CMM (ref 0.8–4.8)
LYMPHOCYTES NFR BLD AUTO: 3.6 % (ref 20–44)
MAGNESIUM SERPL-MCNC: 1.9 MG/DL (ref 1.8–2.4)
MCH RBC QN AUTO: 31 PG (ref 26–33)
MCHC RBC AUTO-ENTMCNC: 34 G/DL (ref 31–36)
MCV RBC AUTO: 92 FL (ref 82–100)
MONOCYTES NFR BLD AUTO: 0.4 /CMM (ref 0.1–1.3)
MONOCYTES NFR BLD AUTO: 2 % (ref 2–12)
NEUTROPHILS # BLD AUTO: 17.4 /CMM (ref 1.8–8.9)
NEUTROPHILS NFR BLD AUTO: 88.8 % (ref 43–81)
PHOSPHATE SERPL-MCNC: 3.7 MG/DL (ref 2.5–4.9)
PLATELET # BLD AUTO: 192 /CMM (ref 150–450)
POTASSIUM SERPL-SCNC: 4 MMOL/L (ref 3.5–5.1)
RBC # BLD AUTO: 2.59 MIL/UL (ref 4–5.2)
RDW COEFFICIENT OF VARIATION: 19.7 (ref 11.5–15)
SODIUM SERPL-SCNC: 139 MMOL/L (ref 136–145)
WBC NRBC COR # BLD AUTO: 19.6 K/UL (ref 4.3–11)

## 2018-02-13 PROCEDURE — 5A1D70Z PERFORMANCE OF URINARY FILTRATION, INTERMITTENT, LESS THAN 6 HOURS PER DAY: ICD-10-PCS | Performed by: INTERNAL MEDICINE

## 2018-02-13 RX ADMIN — AMIODARONE HYDROCHLORIDE SCH MG: 200 TABLET ORAL at 10:23

## 2018-02-13 RX ADMIN — Medication SCH TAB: at 10:24

## 2018-02-13 RX ADMIN — Medication SCH MLS/HR: at 21:35

## 2018-02-13 RX ADMIN — AMIODARONE HYDROCHLORIDE SCH MG: 200 TABLET ORAL at 13:00

## 2018-02-13 RX ADMIN — MUPIROCIN SCH APPLIC: 20 OINTMENT TOPICAL at 10:26

## 2018-02-13 RX ADMIN — MEROPENEM SCH MLS/HR: 500 INJECTION INTRAVENOUS at 08:17

## 2018-02-13 RX ADMIN — Medication SCH APPLIC: at 10:25

## 2018-02-13 RX ADMIN — MUPIROCIN SCH APPLIC: 20 OINTMENT TOPICAL at 20:30

## 2018-02-13 RX ADMIN — MEROPENEM SCH MLS/HR: 500 INJECTION INTRAVENOUS at 20:30

## 2018-02-13 RX ADMIN — SODIUM CHLORIDE SCH MLS/HR: 9 INJECTION, SOLUTION INTRAVENOUS at 20:10

## 2018-02-13 RX ADMIN — AMIODARONE HYDROCHLORIDE SCH MG: 200 TABLET ORAL at 17:00

## 2018-02-13 RX ADMIN — Medication SCH MLS/HR: at 10:24

## 2018-02-13 RX ADMIN — SODIUM CHLORIDE SCH MG: 9 INJECTION, SOLUTION INTRAVENOUS at 10:23

## 2018-02-13 NOTE — NUR
Rt note:

Patient received trached on  vent. Alarms verified and audible. Suctioned and lavaged 
moderate-large amount of thick tan secretions. Vent plugged into red outlet. Ambu bag at 
[facility].

## 2018-02-13 NOTE — NUR
RT NOTES 



PT RECEIVED ON [facility] VENT WITH NOTED SETTING. VENT TO RED OUTLET. AMBU BAG AT [facility]. MLT DONE. 
ALARMS SET AND AUDIBLE. NO SOB OR RESP DISTRESS NOTED SHIFT. 

-------------------------------------------------------------------------------

Addendum: 02/13/18 at 0147 by HOUSTON HUNTER RT

-------------------------------------------------------------------------------

Amended: Links added.

## 2018-02-13 NOTE — NUR
TELE RN OPENING NOTES

RECEIVED REPORT FROM AM RN. PATIENT OBTUNDED & VENT-TRACH DEPENDENT. TRACH INTACT W/ VENT 
SETTINGS AC 12, , FIO2 40%, PEEP 5. NO RESPIRATORY DISTRESS NOTED, SATING WELL @ 
%. ON TELE SINUS RHYTHM IN THE 70S. LEFT UPPER ARM MIDLINE #18 INTACT W/ DRESSING CDI, 
TKO. G-TUBE INTACT & FLUSHING WELL W/ NOVASOURCE @ 25 ML/HR. NO RESIDUAL NOTED @ THIS TIME. 
NO S/S OF PAIN OR DISCOMFORT @ THIS TIME. SAFETY MEASURES IN PLACE W/ SIDE RAILS UP & BED 
LOCKED & IN LOWEST POSITION. WILL CONTINUE TO MONITOR.

## 2018-02-14 VITALS — SYSTOLIC BLOOD PRESSURE: 97 MMHG | DIASTOLIC BLOOD PRESSURE: 40 MMHG

## 2018-02-14 VITALS — SYSTOLIC BLOOD PRESSURE: 117 MMHG | DIASTOLIC BLOOD PRESSURE: 56 MMHG

## 2018-02-14 VITALS — DIASTOLIC BLOOD PRESSURE: 72 MMHG | SYSTOLIC BLOOD PRESSURE: 110 MMHG

## 2018-02-14 VITALS — SYSTOLIC BLOOD PRESSURE: 114 MMHG | DIASTOLIC BLOOD PRESSURE: 42 MMHG

## 2018-02-14 VITALS — DIASTOLIC BLOOD PRESSURE: 47 MMHG | SYSTOLIC BLOOD PRESSURE: 130 MMHG

## 2018-02-14 VITALS — DIASTOLIC BLOOD PRESSURE: 32 MMHG | SYSTOLIC BLOOD PRESSURE: 116 MMHG

## 2018-02-14 LAB
BASOPHILS # BLD AUTO: 0 /CMM (ref 0–0.2)
BASOPHILS NFR BLD AUTO: 0.2 % (ref 0–2)
BUN SERPL-MCNC: 47 MG/DL (ref 7–18)
CALCIUM SERPL-MCNC: 7.1 MG/DL (ref 8.5–10.1)
CHLORIDE SERPL-SCNC: 104 MMOL/L (ref 98–107)
CO2 SERPL-SCNC: 29 MMOL/L (ref 21–32)
CREAT SERPL-MCNC: 1.8 MG/DL (ref 0.6–1.3)
EOSINOPHIL # BLD AUTO: 1.5 /CMM (ref 0–0.7)
EOSINOPHIL NFR BLD AUTO: 9.2 % (ref 0–6)
GLUCOSE SERPL-MCNC: 92 MG/DL (ref 74–106)
HCT VFR BLD AUTO: 22 % (ref 33–45)
HGB BLD-MCNC: 7.5 G/DL (ref 11.5–14.8)
LYMPHOCYTES NFR BLD AUTO: 1.1 /CMM (ref 0.8–4.8)
LYMPHOCYTES NFR BLD AUTO: 6.7 % (ref 20–44)
MAGNESIUM SERPL-MCNC: 1.8 MG/DL (ref 1.8–2.4)
MCH RBC QN AUTO: 31 PG (ref 26–33)
MCHC RBC AUTO-ENTMCNC: 34 G/DL (ref 31–36)
MCV RBC AUTO: 92 FL (ref 82–100)
MONOCYTES NFR BLD AUTO: 0.6 /CMM (ref 0.1–1.3)
MONOCYTES NFR BLD AUTO: 3.9 % (ref 2–12)
NEUTROPHILS # BLD AUTO: 13.3 /CMM (ref 1.8–8.9)
NEUTROPHILS NFR BLD AUTO: 80 % (ref 43–81)
PHOSPHATE SERPL-MCNC: 3.2 MG/DL (ref 2.5–4.9)
PLATELET # BLD AUTO: 178 /CMM (ref 150–450)
POTASSIUM SERPL-SCNC: 4 MMOL/L (ref 3.5–5.1)
RBC # BLD AUTO: 2.4 MIL/UL (ref 4–5.2)
RDW COEFFICIENT OF VARIATION: 19.9 (ref 11.5–15)
SODIUM SERPL-SCNC: 139 MMOL/L (ref 136–145)
WBC NRBC COR # BLD AUTO: 16.7 K/UL (ref 4.3–11)

## 2018-02-14 PROCEDURE — 5A1D70Z PERFORMANCE OF URINARY FILTRATION, INTERMITTENT, LESS THAN 6 HOURS PER DAY: ICD-10-PCS | Performed by: INTERNAL MEDICINE

## 2018-02-14 RX ADMIN — SODIUM CHLORIDE SCH MLS/HR: 9 INJECTION, SOLUTION INTRAVENOUS at 21:32

## 2018-02-14 RX ADMIN — MEROPENEM SCH MLS/HR: 500 INJECTION INTRAVENOUS at 08:02

## 2018-02-14 RX ADMIN — MUPIROCIN SCH APPLIC: 20 OINTMENT TOPICAL at 08:22

## 2018-02-14 RX ADMIN — AMIODARONE HYDROCHLORIDE SCH MG: 200 TABLET ORAL at 12:14

## 2018-02-14 RX ADMIN — Medication SCH MLS/HR: at 09:28

## 2018-02-14 RX ADMIN — AMIODARONE HYDROCHLORIDE SCH MG: 200 TABLET ORAL at 08:22

## 2018-02-14 RX ADMIN — Medication SCH TAB: at 08:20

## 2018-02-14 RX ADMIN — ACETAMINOPHEN PRN MG: 325 TABLET ORAL at 08:21

## 2018-02-14 RX ADMIN — Medication SCH MLS/HR: at 20:53

## 2018-02-14 RX ADMIN — MEROPENEM SCH MLS/HR: 500 INJECTION INTRAVENOUS at 19:52

## 2018-02-14 RX ADMIN — MUPIROCIN SCH APPLIC: 20 OINTMENT TOPICAL at 20:53

## 2018-02-14 RX ADMIN — AMIODARONE HYDROCHLORIDE SCH MG: 200 TABLET ORAL at 16:10

## 2018-02-14 RX ADMIN — SODIUM CHLORIDE SCH MG: 9 INJECTION, SOLUTION INTRAVENOUS at 08:20

## 2018-02-14 RX ADMIN — Medication SCH APPLIC: at 08:22

## 2018-02-14 NOTE — NUR
TELE RN NOTE 

 T 101.1  YARIEL RN ID NOTIFIED ALSO DR TITUS AT BEDSIDE NOTIFIED THAT T 101.1 WAS EARLIER 
COOLING MEASURE PROVIDED,TYLENOL WAS GIVEN ,WITH ORDER TO DO BLOOD CX

## 2018-02-14 NOTE — NUR
TELE RN OPENING NOTES

RECEIVED REPORT FROM RYAN MADRIGAL. PATIENT OBTUNDED & VENT-TRACH DEPENDENT. TRACH INTACT W/ 
VENT SETTINGS AC 12, , FIO2 40%, PEEP 5. NO RESPIRATORY DISTRESS NOTED, SATING WELL @ 
%. ON TELE SINUS RHYTHM IN THE 70S. LEFT UPPER ARM MIDLINE #18 INTACT W/ DRESSING CDI, 
TKO. G-TUBE INTACT & FLUSHING WELL W/ NOVASOURCE @ 25 ML/HR. NO RESIDUAL NOTED @ THIS TIME. 
NO S/S OF PAIN OR DISCOMFORT @ THIS TIME. SAFETY MEASURES IN PLACE W/ SIDE RAILS UP & BED 
LOCKED & IN LOWEST POSITION. WILL CONTINUE TO MONITOR.

## 2018-02-14 NOTE — NUR
TELE RN NOTE 

 RT AT BEDSIDE ,A TRACH SUCTION DONE , NO FEVER AT THIS TIME , WILL CONT TO MONITOR CLOSELY

## 2018-02-14 NOTE — NUR
TELE RN NOTE 

 T 99.3, ALL NEEDS ATTENDED, SPOKE WITH DR CABRERA NOTIFIED AICHA EARLIER BP WAS LOW AND  
T 101.1 STATED WILL CONT TO MONITOR  LABS AR BEDSIDE BLOOD CX DONE ,

## 2018-02-14 NOTE — NUR
RT

PATIENT REC'D TRACHED ON [facility] VENT WITH SETTINGS SET BY MD KAMALJIT TAVERA. VENT ALARMS CHECKED + 
AUDIBLE. CUFF PRESSURE CHECKED MLT. SUCTIONED WITH SMALL AMT TAN SEMITHICK SECRETIONS. DIM 
COARSE B/S. PATIENT IN NO DISTRESS AT THIS TIME.  AMBU BAG AT [facility]. 

-------------------------------------------------------------------------------

Addendum: 02/14/18 at 0911 by MARIA FERNANDA TIAN RT

-------------------------------------------------------------------------------

Amended: Links added.

## 2018-02-14 NOTE — NUR
TELE RN  NOTES

RECEIVED  PATIENT OBTUNDED & VENT-TRACH DEPENDENT. TRACH INTACT W/ VENT SETTINGS AC 12, TV 
450, FIO2 40%, PEEP 5. NO RESPIRATORY DISTRESS NOTED, SATING WELL @ %. ON TELE SINUS 
RHYTHM IN THE 70S. LEFT UPPER ARM MIDLINE #18 INTACT W/ DRESSING CDI, TKO. G-TUBE INTACT & 
FLUSHING WELL W/ NOVASOURCE @ 25 ML/HR. NO RESIDUAL NOTED @ THIS TIME. NO S/S OF PAIN OR 
DISCOMFORT @ THIS TIME. SAFETY MEASURES IN PLACE W/ SIDE RAILS UP & BED LOCKED & IN LOWEST 
POSITION. WILL CONTINUE TO MONITOR., RT AT BEDSIDE  TRACH SUCTION DONE , BED IN LOWEST AND 
LOCKED POSITION , NOTED T 101.1 WILL DO COOLING MEASURE

## 2018-02-14 NOTE — NUR
TELE RN NOTE 



 ORAL SUCTION  DONE, KEEP CLEAN DRY, ON TRACH TO VENT SETTING , WILL CONT TO MONITOR CLOSELY

## 2018-02-15 VITALS — SYSTOLIC BLOOD PRESSURE: 91 MMHG | DIASTOLIC BLOOD PRESSURE: 32 MMHG

## 2018-02-15 VITALS — DIASTOLIC BLOOD PRESSURE: 43 MMHG | SYSTOLIC BLOOD PRESSURE: 93 MMHG

## 2018-02-15 VITALS — SYSTOLIC BLOOD PRESSURE: 77 MMHG | DIASTOLIC BLOOD PRESSURE: 31 MMHG

## 2018-02-15 VITALS — SYSTOLIC BLOOD PRESSURE: 109 MMHG | DIASTOLIC BLOOD PRESSURE: 48 MMHG

## 2018-02-15 VITALS — DIASTOLIC BLOOD PRESSURE: 47 MMHG | SYSTOLIC BLOOD PRESSURE: 125 MMHG

## 2018-02-15 VITALS — SYSTOLIC BLOOD PRESSURE: 81 MMHG | DIASTOLIC BLOOD PRESSURE: 36 MMHG

## 2018-02-15 RX ADMIN — AMIODARONE HYDROCHLORIDE SCH MG: 200 TABLET ORAL at 10:16

## 2018-02-15 RX ADMIN — MEROPENEM SCH MLS/HR: 500 INJECTION INTRAVENOUS at 20:57

## 2018-02-15 RX ADMIN — Medication SCH APPLIC: at 09:24

## 2018-02-15 RX ADMIN — Medication SCH TAB: at 09:17

## 2018-02-15 RX ADMIN — AMIODARONE HYDROCHLORIDE SCH MG: 200 TABLET ORAL at 17:00

## 2018-02-15 RX ADMIN — SODIUM CHLORIDE SCH MLS/HR: 9 INJECTION, SOLUTION INTRAVENOUS at 20:13

## 2018-02-15 RX ADMIN — MEROPENEM SCH MLS/HR: 500 INJECTION INTRAVENOUS at 08:09

## 2018-02-15 RX ADMIN — AMIODARONE HYDROCHLORIDE SCH MG: 200 TABLET ORAL at 13:00

## 2018-02-15 RX ADMIN — MUPIROCIN SCH APPLIC: 20 OINTMENT TOPICAL at 09:18

## 2018-02-15 RX ADMIN — MUPIROCIN SCH APPLIC: 20 OINTMENT TOPICAL at 22:17

## 2018-02-15 RX ADMIN — ACETAMINOPHEN PRN MG: 325 TABLET ORAL at 12:45

## 2018-02-15 RX ADMIN — Medication SCH MLS/HR: at 22:16

## 2018-02-15 RX ADMIN — Medication SCH MLS/HR: at 09:17

## 2018-02-15 RX ADMIN — SODIUM CHLORIDE SCH MG: 9 INJECTION, SOLUTION INTRAVENOUS at 09:17

## 2018-02-15 NOTE — NUR
RN TELE NOTE:



PATIENT IN BED, ASLEEP W/ HOB ELEVATED. RESPIRATION IS EVEN AND UNLABORED. VENT-TRACH 
DEPENDENT. (R) UA MIDLINE REMAINED PATENT AND INTACT. NO S/S OF DISCOMFORT/PAIN. ON CARDIAC 
MONITOR SR= 70. GT FEEDING OF NOVASOURCE @25CC/HR AND TOLERATING IT WELL. CALL LIGHT WITHIN 
REACH. BED ALARM AND LOCKED AT ALL TIMES.

## 2018-02-15 NOTE — NUR
RN TELE NOTE:



CALLED AND SPOKE W/ LUCIO DE LOS SANTOS (DAUGHTER) AND MADE HER AWARE RE: THE MD ORDER FOR 
ULTRASOUND GUIDED THORACENTESIS OF THE (R) LUNG. LUCIO GAVE A VERBAL CONSENT AND IT WAS 
VERIFIED VIA PHONE BY ROHAN ENGEL AND ROHAN RAMIREZ.

## 2018-02-15 NOTE — NUR
RN TELE NOTE:



PATIENT'S AMIODARONE WAS HELD DUE TO LOW BP 75/44, HR 60. SOULEYMANE GALLEGO NP AWARE.

## 2018-02-15 NOTE — NUR
RN TELE NOTE:



INFORMED SOULEYMANE GALLEGO NP RE: THE PATIENT'S BP OF 82/34 HR OF 68. NP MADE AWARE THAT AMIODARONE 
WAS HELD. HE WAS ALSO AWARE THAT PT HAD A TEMP .1F AND TYLENOL 650 MG WAS ADMINISTERED 
W/ COOLING MEASURE.

## 2018-02-15 NOTE — NUR
RN TELE NOTE:



RECEIVED AN ORDER FROM SOULEYMANE GALLEGO NP RE: PATIENT'S LOW BP. NS 500ML IV BOLUS WAS ORDERED.

## 2018-02-15 NOTE — NUR
RN TELE NOTE:



RECHECKED THE PATIENT'S GT RESIDUAL AND NO RESIDUAL WAS NOTED. PATIENT TOLERATING THE 
CURRENT GT FEEDING RATE (NOVASOURCE@35CC/HR).

## 2018-02-15 NOTE — NUR
RN TELE NOTE:



PATIENT IN BED, ASLEEP W/ HOB ELEVATED. RESPIRATION IS EVEN AND UNLABORED. VENT-TRACH 
DEPENDENT. (R) UA MIDLINE REMAINED PATENT AND INTACT. NO S/S OF DISCOMFORT/PAIN. GT FEEDING 
OF NOVASOURCE @35CC/HR AND TOLERATING IT WELL. CURRENTLY RECEIVING IV BOLUS OF NS 500ML PER 
SOULEYMANE GALLEGO NP. AFEBRILE. TEMP 98.4F. WILL REPORT TO PM SHIFT NURSE FOR CONTINUITY OF CARE.

## 2018-02-15 NOTE — NUR
TELE RN OPENING NOTES

RECEIVED REPORT FROM ASHLEY MADRIGAL. PATIENT OBTUNDED & VENT-TRACH DEPENDENT. TRACH INTACT W/ 
VENT SETTINGS AC 12, , FIO2 40%, PEEP 5. NO RESPIRATORY DISTRESS NOTED, SATING WELL @ 
%. ON TELE SINUS RHYTHM IN THE 70S. LEFT UPPER ARM MIDLINE #18 INTACT W/ DRESSING CDI, 
TKO. G-TUBE INTACT & FLUSHING WELL W/ NOVASOURCE @ 35 ML/HR. NO RESIDUAL NOTED @ THIS TIME. 
NO S/S OF PAIN OR DISCOMFORT @ THIS TIME. SAFETY MEASURES IN PLACE W/ SIDE RAILS UP & BED 
LOCKED & IN LOWEST POSITION. WILL CONTINUE TO MONITOR.

## 2018-02-15 NOTE — NUR
RN TELE NOTE:



PATIENT'S GT FEEDING OF NOVASOURCE WAS INCREASED TO 35CC/HR AND WILL MONITOR FOR ANY 
RESIDUAL.

## 2018-02-15 NOTE — NUR
ICU RN

DURING EVENING CARE, PT WAS NOTED TO HAVE A DISLODGED FLEXISEAL. NEW FLEXISEAL WAS INSERTED 
DUE TO DIARRHEA AND SKIN PROTECTION. WILL CONTINUE TO MONITOR. 

-------------------------------------------------------------------------------

Addendum: 02/19/18 at 0531 by SHABANA SCHREIBER RN

-------------------------------------------------------------------------------

DATE FOR THIS NOTE IS 2/18/18

## 2018-02-16 VITALS — SYSTOLIC BLOOD PRESSURE: 71 MMHG | DIASTOLIC BLOOD PRESSURE: 39 MMHG

## 2018-02-16 VITALS — SYSTOLIC BLOOD PRESSURE: 79 MMHG | DIASTOLIC BLOOD PRESSURE: 45 MMHG

## 2018-02-16 VITALS — SYSTOLIC BLOOD PRESSURE: 99 MMHG | DIASTOLIC BLOOD PRESSURE: 49 MMHG

## 2018-02-16 VITALS — DIASTOLIC BLOOD PRESSURE: 41 MMHG | SYSTOLIC BLOOD PRESSURE: 83 MMHG

## 2018-02-16 VITALS — SYSTOLIC BLOOD PRESSURE: 108 MMHG | DIASTOLIC BLOOD PRESSURE: 47 MMHG

## 2018-02-16 VITALS — DIASTOLIC BLOOD PRESSURE: 50 MMHG | SYSTOLIC BLOOD PRESSURE: 90 MMHG

## 2018-02-16 VITALS — SYSTOLIC BLOOD PRESSURE: 93 MMHG | DIASTOLIC BLOOD PRESSURE: 48 MMHG

## 2018-02-16 VITALS — DIASTOLIC BLOOD PRESSURE: 68 MMHG | SYSTOLIC BLOOD PRESSURE: 115 MMHG

## 2018-02-16 VITALS — DIASTOLIC BLOOD PRESSURE: 36 MMHG | SYSTOLIC BLOOD PRESSURE: 81 MMHG

## 2018-02-16 VITALS — SYSTOLIC BLOOD PRESSURE: 90 MMHG | DIASTOLIC BLOOD PRESSURE: 42 MMHG

## 2018-02-16 VITALS — DIASTOLIC BLOOD PRESSURE: 35 MMHG | SYSTOLIC BLOOD PRESSURE: 77 MMHG

## 2018-02-16 VITALS — DIASTOLIC BLOOD PRESSURE: 42 MMHG | SYSTOLIC BLOOD PRESSURE: 80 MMHG

## 2018-02-16 VITALS — DIASTOLIC BLOOD PRESSURE: 39 MMHG | SYSTOLIC BLOOD PRESSURE: 71 MMHG

## 2018-02-16 LAB
ALBUMIN SERPL BCP-MCNC: 1.3 G/DL (ref 3.4–5)
ALP SERPL-CCNC: 515 U/L (ref 46–116)
ALT SERPL W P-5'-P-CCNC: 37 U/L (ref 12–78)
AST SERPL W P-5'-P-CCNC: 47 U/L (ref 15–37)
BASOPHILS # BLD AUTO: 0 /CMM (ref 0–0.2)
BASOPHILS # BLD AUTO: 0 /CMM (ref 0–0.2)
BASOPHILS NFR BLD AUTO: 0 % (ref 0–2)
BASOPHILS NFR BLD AUTO: 0.2 % (ref 0–2)
BILIRUB SERPL-MCNC: 1 MG/DL (ref 0.2–1)
BUN SERPL-MCNC: 58 MG/DL (ref 7–18)
CALCIUM SERPL-MCNC: 7.6 MG/DL (ref 8.5–10.1)
CHLORIDE SERPL-SCNC: 104 MMOL/L (ref 98–107)
CO2 SERPL-SCNC: 24 MMOL/L (ref 21–32)
CREAT SERPL-MCNC: 2.1 MG/DL (ref 0.6–1.3)
EOSINOPHIL # BLD AUTO: 0.8 /CMM (ref 0–0.7)
EOSINOPHIL # BLD AUTO: 1.5 /CMM (ref 0–0.7)
EOSINOPHIL NFR BLD AUTO: 6.8 % (ref 0–6)
EOSINOPHIL NFR BLD AUTO: 7.8 % (ref 0–6)
EOSINOPHIL NFR BLD MANUAL: 4 % (ref 0–4)
GLUCOSE SERPL-MCNC: 84 MG/DL (ref 74–106)
HCT VFR BLD AUTO: 19 % (ref 33–45)
HCT VFR BLD AUTO: 20 % (ref 33–45)
HGB BLD-MCNC: 6.3 G/DL (ref 11.5–14.8)
HGB BLD-MCNC: 6.6 G/DL (ref 11.5–14.8)
INR PPP: 1.76 (ref 0.87–1.13)
LYMPHOCYTES NFR BLD AUTO: 0.5 /CMM (ref 0.8–4.8)
LYMPHOCYTES NFR BLD AUTO: 1 /CMM (ref 0.8–4.8)
LYMPHOCYTES NFR BLD AUTO: 4.4 % (ref 20–44)
LYMPHOCYTES NFR BLD AUTO: 5.3 % (ref 20–44)
LYMPHOCYTES NFR BLD MANUAL: 5 % (ref 16–48)
MAGNESIUM SERPL-MCNC: 1.8 MG/DL (ref 1.8–2.4)
MCH RBC QN AUTO: 30 PG (ref 26–33)
MCH RBC QN AUTO: 31 PG (ref 26–33)
MCHC RBC AUTO-ENTMCNC: 33 G/DL (ref 31–36)
MCHC RBC AUTO-ENTMCNC: 34 G/DL (ref 31–36)
MCV RBC AUTO: 90 FL (ref 82–100)
MCV RBC AUTO: 92 FL (ref 82–100)
MONOCYTES NFR BLD AUTO: 0.1 /CMM (ref 0.1–1.3)
MONOCYTES NFR BLD AUTO: 0.4 /CMM (ref 0.1–1.3)
MONOCYTES NFR BLD AUTO: 0.5 % (ref 2–12)
MONOCYTES NFR BLD AUTO: 1.9 % (ref 2–12)
MONOCYTES NFR BLD MANUAL: 2 % (ref 0–11)
NEUTROPHILS # BLD AUTO: 16.4 /CMM (ref 1.8–8.9)
NEUTROPHILS # BLD AUTO: 9.9 /CMM (ref 1.8–8.9)
NEUTROPHILS NFR BLD AUTO: 84.8 % (ref 43–81)
NEUTROPHILS NFR BLD AUTO: 88.3 % (ref 43–81)
NEUTS SEG NFR BLD MANUAL: 89 % (ref 42–76)
PHOSPHATE SERPL-MCNC: 3.8 MG/DL (ref 2.5–4.9)
PLATELET # BLD AUTO: 114 /CMM (ref 150–450)
PLATELET # BLD AUTO: 160 /CMM (ref 150–450)
POTASSIUM SERPL-SCNC: 4.4 MMOL/L (ref 3.5–5.1)
PROT SERPL-MCNC: 5.3 G/DL (ref 6.4–8.2)
RBC # BLD AUTO: 2 MIL/UL (ref 4–5.2)
RBC # BLD AUTO: 2.21 MIL/UL (ref 4–5.2)
RDW COEFFICIENT OF VARIATION: 19.1 (ref 11.5–15)
RDW COEFFICIENT OF VARIATION: 19.8 (ref 11.5–15)
SODIUM SERPL-SCNC: 138 MMOL/L (ref 136–145)
WBC NRBC COR # BLD AUTO: 11.2 K/UL (ref 4.3–11)
WBC NRBC COR # BLD AUTO: 19.3 K/UL (ref 4.3–11)

## 2018-02-16 PROCEDURE — 0W993ZZ DRAINAGE OF RIGHT PLEURAL CAVITY, PERCUTANEOUS APPROACH: ICD-10-PCS

## 2018-02-16 PROCEDURE — 5A1D70Z PERFORMANCE OF URINARY FILTRATION, INTERMITTENT, LESS THAN 6 HOURS PER DAY: ICD-10-PCS | Performed by: INTERNAL MEDICINE

## 2018-02-16 RX ADMIN — MEROPENEM SCH MLS/HR: 500 INJECTION INTRAVENOUS at 20:37

## 2018-02-16 RX ADMIN — AMIODARONE HYDROCHLORIDE SCH MG: 200 TABLET ORAL at 08:55

## 2018-02-16 RX ADMIN — Medication SCH APPLIC: at 08:59

## 2018-02-16 RX ADMIN — SODIUM CHLORIDE SCH MG: 9 INJECTION, SOLUTION INTRAVENOUS at 08:56

## 2018-02-16 RX ADMIN — Medication SCH MLS/HR: at 09:07

## 2018-02-16 RX ADMIN — Medication SCH MLS/HR: at 21:09

## 2018-02-16 RX ADMIN — MUPIROCIN SCH APPLIC: 20 OINTMENT TOPICAL at 08:59

## 2018-02-16 RX ADMIN — Medication PRN ML: at 22:28

## 2018-02-16 RX ADMIN — AMIODARONE HYDROCHLORIDE SCH MG: 200 TABLET ORAL at 12:23

## 2018-02-16 RX ADMIN — MUPIROCIN SCH APPLIC: 20 OINTMENT TOPICAL at 20:48

## 2018-02-16 RX ADMIN — AMIODARONE HYDROCHLORIDE SCH MG: 200 TABLET ORAL at 17:00

## 2018-02-16 RX ADMIN — Medication SCH TAB: at 09:06

## 2018-02-16 RX ADMIN — SODIUM CHLORIDE SCH MLS/HR: 9 INJECTION, SOLUTION INTRAVENOUS at 21:28

## 2018-02-16 RX ADMIN — MEROPENEM SCH MLS/HR: 500 INJECTION INTRAVENOUS at 08:55

## 2018-02-16 NOTE — NUR
RN NOTE 

RN CONTACTED [facility] TO NOTIFY MD SOULEYMANE CELAYA IN REGARDS TO CRITICAL LABS HB.3 
HCT19 AND ALBUMIN 1.3 , RN AWAITING RETURN PHONE CALL CHARGE NURSE NOTIFIED

## 2018-02-16 NOTE — NUR
RN NOTES

PATIENT NOTED W/ BP 79/45, HR 71. SPOKE TO ÓSCAR BARROW & RECEIVED NEW ORDER FOR BOLUS. NEW 
ORDER NOTED & CARRIED OUT. WILL CONTINUE TO MONITOR PATIENT CLOSELY.

## 2018-02-16 NOTE — NUR
TELE RN INITIAL  NOTES



RN RECEIVED PATIENT IN BED OBTUNDED & VENT-TRACH DEPENDENT. TRACH INTACT W/ VENT SETTINGS AC 
12, , FIO2 40%, PEEP 5. NO RESPIRATORY DISTRESS NOTED, PT SATING WELL 98%. PT ON TELE- 
SINUS RHYTHM IN THE 70S. LEFT UPPER ARM MIDLINE #18 INTACT W/ DRESSING CLEAN DRY AND INTACT, 
TKO. G-TUBE INTACT & FLUSHING WELL W/ NOVASOURCE @ 35 ML/HR. NO RESIDUAL NOTED @ THIS TIME. 
NO S/S OF PAIN OR DISCOMFORT @ THIS TIME. SAFETY MEASURES IN PLACE W/ SIDE RAILS UP & BED 
LOCKED & IN LOWEST POSITION. RN WILL CONTINUE TO MONITOR THROUGHOUT THE DAY.

## 2018-02-16 NOTE — NUR
2:00 PM - PATIENT CURRENTLY ON DIALYSIS.  WAITING TO DO ULTRASOUND GUIDED THORACENTESIS. 
WILL CHECK BACK AT 3:30 PM

## 2018-02-16 NOTE — NUR
RN CLOSING NOTE 

PATIENT HAS EXPERIENCED DECREASED BLOOD PRESSURE THROUGHOUT THE DAY , HOWEVER PATIENT 
RECEIVED 2 L OF RBC PER MD GALLEGO ORDER FOR LOW H/H PATIENT HAS BEEN TURNED AND REPOSITIONED 
Q 2HRS PATIENT LABS MONITORED AND ASSESSED PATIENT HAS REMAINED IN STABLE CONDITION 
THROUGHOUT THE DAY NO SOB NOTED TOLERATED VENT SETTINGS WELL , 5L REMOVED DURING 
HEMODIALYSIS

## 2018-02-17 VITALS — DIASTOLIC BLOOD PRESSURE: 77 MMHG | SYSTOLIC BLOOD PRESSURE: 140 MMHG

## 2018-02-17 VITALS — DIASTOLIC BLOOD PRESSURE: 73 MMHG | SYSTOLIC BLOOD PRESSURE: 120 MMHG

## 2018-02-17 VITALS — DIASTOLIC BLOOD PRESSURE: 63 MMHG | SYSTOLIC BLOOD PRESSURE: 111 MMHG

## 2018-02-17 VITALS — SYSTOLIC BLOOD PRESSURE: 122 MMHG | DIASTOLIC BLOOD PRESSURE: 69 MMHG

## 2018-02-17 VITALS — SYSTOLIC BLOOD PRESSURE: 63 MMHG | DIASTOLIC BLOOD PRESSURE: 43 MMHG

## 2018-02-17 VITALS — SYSTOLIC BLOOD PRESSURE: 110 MMHG | DIASTOLIC BLOOD PRESSURE: 72 MMHG

## 2018-02-17 VITALS — SYSTOLIC BLOOD PRESSURE: 155 MMHG | DIASTOLIC BLOOD PRESSURE: 80 MMHG

## 2018-02-17 VITALS — DIASTOLIC BLOOD PRESSURE: 48 MMHG | SYSTOLIC BLOOD PRESSURE: 74 MMHG

## 2018-02-17 VITALS — DIASTOLIC BLOOD PRESSURE: 51 MMHG | SYSTOLIC BLOOD PRESSURE: 95 MMHG

## 2018-02-17 VITALS — DIASTOLIC BLOOD PRESSURE: 70 MMHG | SYSTOLIC BLOOD PRESSURE: 111 MMHG

## 2018-02-17 VITALS — DIASTOLIC BLOOD PRESSURE: 62 MMHG | SYSTOLIC BLOOD PRESSURE: 96 MMHG

## 2018-02-17 VITALS — DIASTOLIC BLOOD PRESSURE: 65 MMHG | SYSTOLIC BLOOD PRESSURE: 119 MMHG

## 2018-02-17 VITALS — SYSTOLIC BLOOD PRESSURE: 99 MMHG | DIASTOLIC BLOOD PRESSURE: 61 MMHG

## 2018-02-17 VITALS — SYSTOLIC BLOOD PRESSURE: 79 MMHG | DIASTOLIC BLOOD PRESSURE: 50 MMHG

## 2018-02-17 VITALS — DIASTOLIC BLOOD PRESSURE: 70 MMHG | SYSTOLIC BLOOD PRESSURE: 101 MMHG

## 2018-02-17 VITALS — SYSTOLIC BLOOD PRESSURE: 92 MMHG | DIASTOLIC BLOOD PRESSURE: 53 MMHG

## 2018-02-17 VITALS — SYSTOLIC BLOOD PRESSURE: 66 MMHG | DIASTOLIC BLOOD PRESSURE: 40 MMHG

## 2018-02-17 VITALS — SYSTOLIC BLOOD PRESSURE: 119 MMHG | DIASTOLIC BLOOD PRESSURE: 67 MMHG

## 2018-02-17 VITALS — SYSTOLIC BLOOD PRESSURE: 115 MMHG | DIASTOLIC BLOOD PRESSURE: 68 MMHG

## 2018-02-17 VITALS — SYSTOLIC BLOOD PRESSURE: 97 MMHG | DIASTOLIC BLOOD PRESSURE: 61 MMHG

## 2018-02-17 VITALS — DIASTOLIC BLOOD PRESSURE: 71 MMHG | SYSTOLIC BLOOD PRESSURE: 112 MMHG

## 2018-02-17 VITALS — SYSTOLIC BLOOD PRESSURE: 83 MMHG | DIASTOLIC BLOOD PRESSURE: 54 MMHG

## 2018-02-17 VITALS — DIASTOLIC BLOOD PRESSURE: 43 MMHG | SYSTOLIC BLOOD PRESSURE: 75 MMHG

## 2018-02-17 VITALS — SYSTOLIC BLOOD PRESSURE: 92 MMHG | DIASTOLIC BLOOD PRESSURE: 57 MMHG

## 2018-02-17 VITALS — DIASTOLIC BLOOD PRESSURE: 50 MMHG | SYSTOLIC BLOOD PRESSURE: 71 MMHG

## 2018-02-17 VITALS — SYSTOLIC BLOOD PRESSURE: 91 MMHG | DIASTOLIC BLOOD PRESSURE: 56 MMHG

## 2018-02-17 VITALS — SYSTOLIC BLOOD PRESSURE: 105 MMHG | DIASTOLIC BLOOD PRESSURE: 58 MMHG

## 2018-02-17 VITALS — DIASTOLIC BLOOD PRESSURE: 58 MMHG | SYSTOLIC BLOOD PRESSURE: 114 MMHG

## 2018-02-17 VITALS — DIASTOLIC BLOOD PRESSURE: 62 MMHG | SYSTOLIC BLOOD PRESSURE: 118 MMHG

## 2018-02-17 VITALS — SYSTOLIC BLOOD PRESSURE: 89 MMHG | DIASTOLIC BLOOD PRESSURE: 43 MMHG

## 2018-02-17 VITALS — SYSTOLIC BLOOD PRESSURE: 101 MMHG | DIASTOLIC BLOOD PRESSURE: 47 MMHG

## 2018-02-17 VITALS — SYSTOLIC BLOOD PRESSURE: 126 MMHG | DIASTOLIC BLOOD PRESSURE: 72 MMHG

## 2018-02-17 VITALS — SYSTOLIC BLOOD PRESSURE: 64 MMHG | DIASTOLIC BLOOD PRESSURE: 44 MMHG

## 2018-02-17 VITALS — SYSTOLIC BLOOD PRESSURE: 99 MMHG | DIASTOLIC BLOOD PRESSURE: 50 MMHG

## 2018-02-17 LAB
BASOPHILS # BLD AUTO: 0 /CMM (ref 0–0.2)
BASOPHILS # BLD AUTO: 0 /CMM (ref 0–0.2)
BASOPHILS NFR BLD AUTO: 0.1 % (ref 0–2)
BASOPHILS NFR BLD AUTO: 0.2 % (ref 0–2)
BUN SERPL-MCNC: 39 MG/DL (ref 7–18)
CALCIUM SERPL-MCNC: 8 MG/DL (ref 8.5–10.1)
CHLORIDE SERPL-SCNC: 104 MMOL/L (ref 98–107)
CO2 SERPL-SCNC: 27 MMOL/L (ref 21–32)
CREAT SERPL-MCNC: 1.7 MG/DL (ref 0.6–1.3)
EOSINOPHIL # BLD AUTO: 1.6 /CMM (ref 0–0.7)
EOSINOPHIL # BLD AUTO: 1.7 /CMM (ref 0–0.7)
EOSINOPHIL NFR BLD AUTO: 10 % (ref 0–6)
EOSINOPHIL NFR BLD AUTO: 10.8 % (ref 0–6)
GLUCOSE SERPL-MCNC: 93 MG/DL (ref 74–106)
HCT VFR BLD AUTO: 24 % (ref 33–45)
HCT VFR BLD AUTO: 25 % (ref 33–45)
HGB BLD-MCNC: 8 G/DL (ref 11.5–14.8)
HGB BLD-MCNC: 8.6 G/DL (ref 11.5–14.8)
LYMPHOCYTES NFR BLD AUTO: 0.8 /CMM (ref 0.8–4.8)
LYMPHOCYTES NFR BLD AUTO: 1.2 /CMM (ref 0.8–4.8)
LYMPHOCYTES NFR BLD AUTO: 5.5 % (ref 20–44)
LYMPHOCYTES NFR BLD AUTO: 6.7 % (ref 20–44)
MAGNESIUM SERPL-MCNC: 1.6 MG/DL (ref 1.8–2.4)
MCH RBC QN AUTO: 30 PG (ref 26–33)
MCH RBC QN AUTO: 31 PG (ref 26–33)
MCHC RBC AUTO-ENTMCNC: 34 G/DL (ref 31–36)
MCHC RBC AUTO-ENTMCNC: 35 G/DL (ref 31–36)
MCV RBC AUTO: 88 FL (ref 82–100)
MCV RBC AUTO: 89 FL (ref 82–100)
MONOCYTES NFR BLD AUTO: 0.4 /CMM (ref 0.1–1.3)
MONOCYTES NFR BLD AUTO: 0.5 /CMM (ref 0.1–1.3)
MONOCYTES NFR BLD AUTO: 2.8 % (ref 2–12)
MONOCYTES NFR BLD AUTO: 2.9 % (ref 2–12)
NEUTROPHILS # BLD AUTO: 12.1 /CMM (ref 1.8–8.9)
NEUTROPHILS # BLD AUTO: 13.9 /CMM (ref 1.8–8.9)
NEUTROPHILS NFR BLD AUTO: 80.3 % (ref 43–81)
NEUTROPHILS NFR BLD AUTO: 80.7 % (ref 43–81)
PHOSPHATE SERPL-MCNC: 3 MG/DL (ref 2.5–4.9)
PLATELET # BLD AUTO: 123 /CMM (ref 150–450)
PLATELET # BLD AUTO: 127 /CMM (ref 150–450)
POTASSIUM SERPL-SCNC: 3.8 MMOL/L (ref 3.5–5.1)
RBC # BLD AUTO: 2.67 MIL/UL (ref 4–5.2)
RBC # BLD AUTO: 2.83 MIL/UL (ref 4–5.2)
RDW COEFFICIENT OF VARIATION: 19 (ref 11.5–15)
RDW COEFFICIENT OF VARIATION: 19.6 (ref 11.5–15)
SODIUM SERPL-SCNC: 139 MMOL/L (ref 136–145)
WBC NRBC COR # BLD AUTO: 15 K/UL (ref 4.3–11)
WBC NRBC COR # BLD AUTO: 17.3 K/UL (ref 4.3–11)

## 2018-02-17 PROCEDURE — 05HM33Z INSERTION OF INFUSION DEVICE INTO RIGHT INTERNAL JUGULAR VEIN, PERCUTANEOUS APPROACH: ICD-10-PCS | Performed by: NURSE PRACTITIONER

## 2018-02-17 PROCEDURE — B543ZZA ULTRASONOGRAPHY OF RIGHT JUGULAR VEINS, GUIDANCE: ICD-10-PCS | Performed by: NURSE PRACTITIONER

## 2018-02-17 RX ADMIN — Medication SCH TAB: at 08:50

## 2018-02-17 RX ADMIN — Medication SCH MLS/HR: at 21:32

## 2018-02-17 RX ADMIN — Medication SCH MLS/HR: at 09:32

## 2018-02-17 RX ADMIN — MEROPENEM SCH MLS/HR: 500 INJECTION INTRAVENOUS at 20:37

## 2018-02-17 RX ADMIN — MUPIROCIN SCH APPLIC: 20 OINTMENT TOPICAL at 08:54

## 2018-02-17 RX ADMIN — AMIODARONE HYDROCHLORIDE SCH MG: 200 TABLET ORAL at 12:43

## 2018-02-17 RX ADMIN — HYDROCORTISONE SODIUM SUCCINATE SCH MG: 100 INJECTION, POWDER, FOR SOLUTION INTRAMUSCULAR; INTRAVASCULAR at 16:36

## 2018-02-17 RX ADMIN — SODIUM CHLORIDE SCH MLS/HR: 9 INJECTION, SOLUTION INTRAVENOUS at 19:50

## 2018-02-17 RX ADMIN — AMIODARONE HYDROCHLORIDE SCH MG: 200 TABLET ORAL at 08:49

## 2018-02-17 RX ADMIN — MUPIROCIN SCH APPLIC: 20 OINTMENT TOPICAL at 21:00

## 2018-02-17 RX ADMIN — SODIUM CHLORIDE SCH MG: 9 INJECTION, SOLUTION INTRAVENOUS at 08:50

## 2018-02-17 RX ADMIN — MEROPENEM SCH MLS/HR: 500 INJECTION INTRAVENOUS at 08:49

## 2018-02-17 RX ADMIN — Medication SCH APPLIC: at 08:50

## 2018-02-17 NOTE — NUR
RN INITIAL  NOTES



RECEIVED PATIENT IN BED OBTUNDED & VENT-TRACH DEPENDENT. TRACH INTACT W/ VENT SETTINGS AC 
12, , FIO2 40%, PEEP 5. NO RESPIRATORY DISTRESS NOTED, PT SATING WELL 98%. PT ON TELE- 
SINUS RHYTHM IN THE 70S. LEFT UPPER ARM MIDLINE #18 INTACT W/ DRESSING CLEAN DRY AND INTACT, 
TKO. G-TUBE INTACT & FLUSHING WELL W/ NOVASOURCE @ 35 ML/HR. NO RESIDUAL NOTED @ THIS TIME. 
NO S/S OF PAIN OR DISCOMFORT @ THIS TIME. SAFETY MEASURES IN PLACE W/ SIDE RAILS UP & BED 
LOCKED & IN LOWEST POSITION. RN WILL CONTINUE TO MONITOR.

## 2018-02-17 NOTE — NUR
RN  INITIAL ICU NOTES

RECEIVED PT IN  ROOM 252, OBTUNDED,NONVERBAL , VENT/ TRACH DEPENDENT , TOLERATING CURRENT 
VENT SETTING WELL, O2 % AT THIS TIME , NO SOB NOTED, BP= 112/71 ON LEVO @ 2 MCG, ON 
TELE SR HR IN 87'S , SR UP x3, CALL LIGHTS WITHIN EASY REACH,  BED LOCKED AND IN LOWEST 
POSITION WILL CONTINUE TO MONITOR PT CLOSELY.

## 2018-02-17 NOTE — NUR
RN NOTES

RECEIVED PT IN  ROOM 252, OBTUNDED,NONVERBAL , VENT/ TRACH DEPENDENT , TOLERATING CURRENT 
VENT SETTING WELL, O2 % AT THIS TIME , NO SOB NOTED, BP= 79/50, DR TITUS NOTIFIED, ON 
TELE SR HR IN 70'S , SR UP x3, CALL LIGHTS WITHIN EASY REACH,  BED LOCKED AND IN LOWEST 
POSITION WILL CONTINUE TO MONITOR PT CLOSELY.

## 2018-02-17 NOTE — NUR
RN OPEN NOTES



RECEIVED REPORT FROM NIGHT SHIFT NURSE. PATIENT IS IN BED, VENT DEPENDENT AND NON VERBAL. 
BED IN LOW POSITION LOCKED AND TWO SIDE RAILS ARE UP. CALL LIGHT WITHIN REACH FOR SAFETY. 
BREATHING IS BILATERALLY EVEN. NO SIGNS AND SYMPTOMS OF DISTRESS. WILL CONTINUE TO MONITOR 
AND ASSESS PATIENT

## 2018-02-17 NOTE — NUR
BLOOD PRESSURE IS 60/25. DR TITUS NOTIFIED. GIVE ANOTHER 500 ML BOLUS, PLUS CBC AND BCX2. IF 
BP STILL LOW, TRANSFER TO ICU

## 2018-02-17 NOTE — NUR
RN CLOSING NOTES



NO CHANGE IN PTS CONDITION OVER NIGHT, PATIENT IN BED OBTUNDED & VENT-TRACH DEPENDENT. TRACH 
INTACT W/ VENT SETTINGS AC 12, , FIO2 40%, PEEP 5. NO RESPIRATORY DISTRESS NOTED, PT 
SATING WELL 98%. PT ON TELE- SINUS RHYTHM IN THE 70S. LEFT UPPER ARM MIDLINE #18 INTACT W/ 
DRESSING CLEAN DRY AND INTACT, TKO. G-TUBE INTACT & FLUSHING WELL W/ NOVASOURCE @ 35 ML/HR. 
NO RESIDUAL NOTED @ THIS TIME. NO S/S OF PAIN OR DISCOMFORT @ THIS TIME. SAFETY MEASURES IN 
PLACE W/ SIDE RAILS UP & BED LOCKED & IN LOWEST POSITION. RN WILL ENDORSE TO AM RN.

## 2018-02-17 NOTE — NUR
500ML BOLUS ADMINISTERED. BP IS STILL LOW 77/36. DR TITUS AND DR GALLEGO NOTIFIED. PER DR TITUS GIVE ANOTHER 250 ML NS BOLUS

## 2018-02-17 NOTE — NUR
RN NOTES

TRACH CARE DONE, RESPIRATION EVEN AND UNLABORED, HR IN 80'S SR , /59, LEVOPHED AT 
2MCG/MIN RUNNING VIA R NECK IJ TLC, SITE CDI,  TF NOVASOURCE AT 35CC/HR RUNNING VIA GT , NO 
RESIDUAL NOTED, SR UP x3, CALL LIGHT WITHIN EASY REACH, WILL ENDORSE TO NIGHT SHIFT NURSE 
FOR MAISHA.

## 2018-02-17 NOTE — NUR
RN NOTES 

R IJ TLC LINE INSERTED AT THE BEDSIDE BY DR GALLEGO . CONTINUE TO MONITOR THE INSERTION SITE 
CLOSELY . NO COMPLICATION NOTED .

## 2018-02-17 NOTE — NUR
PT RECEIVED TRACHED  VENT SHLY 6. NO RESP DISTRESS NOTED. PT TOLERATING VENT SETTINGS. 
SX'D FRO SML AMT OF TAN THICK SECRETIONS. VENT ALARMS SET AND AUDIBLE. AMBU BAG AT BEDSIDE. 
WILL CONTINUE TO MONITOR.

-------------------------------------------------------------------------------

Addendum: 02/17/18 at 2016 by ANDREE GRIJALVA RT

-------------------------------------------------------------------------------

Amended: Links added.

## 2018-02-18 VITALS — DIASTOLIC BLOOD PRESSURE: 49 MMHG | SYSTOLIC BLOOD PRESSURE: 92 MMHG

## 2018-02-18 VITALS — DIASTOLIC BLOOD PRESSURE: 64 MMHG | SYSTOLIC BLOOD PRESSURE: 98 MMHG

## 2018-02-18 VITALS — DIASTOLIC BLOOD PRESSURE: 84 MMHG | SYSTOLIC BLOOD PRESSURE: 150 MMHG

## 2018-02-18 VITALS — DIASTOLIC BLOOD PRESSURE: 80 MMHG | SYSTOLIC BLOOD PRESSURE: 129 MMHG

## 2018-02-18 VITALS — SYSTOLIC BLOOD PRESSURE: 87 MMHG | DIASTOLIC BLOOD PRESSURE: 51 MMHG

## 2018-02-18 VITALS — SYSTOLIC BLOOD PRESSURE: 107 MMHG | DIASTOLIC BLOOD PRESSURE: 65 MMHG

## 2018-02-18 VITALS — SYSTOLIC BLOOD PRESSURE: 127 MMHG | DIASTOLIC BLOOD PRESSURE: 70 MMHG

## 2018-02-18 VITALS — SYSTOLIC BLOOD PRESSURE: 95 MMHG | DIASTOLIC BLOOD PRESSURE: 59 MMHG

## 2018-02-18 VITALS — DIASTOLIC BLOOD PRESSURE: 76 MMHG | SYSTOLIC BLOOD PRESSURE: 126 MMHG

## 2018-02-18 VITALS — DIASTOLIC BLOOD PRESSURE: 74 MMHG | SYSTOLIC BLOOD PRESSURE: 124 MMHG

## 2018-02-18 VITALS — SYSTOLIC BLOOD PRESSURE: 122 MMHG | DIASTOLIC BLOOD PRESSURE: 62 MMHG

## 2018-02-18 VITALS — DIASTOLIC BLOOD PRESSURE: 58 MMHG | SYSTOLIC BLOOD PRESSURE: 93 MMHG

## 2018-02-18 VITALS — DIASTOLIC BLOOD PRESSURE: 73 MMHG | SYSTOLIC BLOOD PRESSURE: 110 MMHG

## 2018-02-18 VITALS — SYSTOLIC BLOOD PRESSURE: 134 MMHG | DIASTOLIC BLOOD PRESSURE: 75 MMHG

## 2018-02-18 VITALS — DIASTOLIC BLOOD PRESSURE: 82 MMHG | SYSTOLIC BLOOD PRESSURE: 140 MMHG

## 2018-02-18 VITALS — DIASTOLIC BLOOD PRESSURE: 80 MMHG | SYSTOLIC BLOOD PRESSURE: 156 MMHG

## 2018-02-18 VITALS — SYSTOLIC BLOOD PRESSURE: 114 MMHG | DIASTOLIC BLOOD PRESSURE: 63 MMHG

## 2018-02-18 VITALS — SYSTOLIC BLOOD PRESSURE: 138 MMHG | DIASTOLIC BLOOD PRESSURE: 74 MMHG

## 2018-02-18 VITALS — DIASTOLIC BLOOD PRESSURE: 57 MMHG | SYSTOLIC BLOOD PRESSURE: 90 MMHG

## 2018-02-18 VITALS — DIASTOLIC BLOOD PRESSURE: 78 MMHG | SYSTOLIC BLOOD PRESSURE: 148 MMHG

## 2018-02-18 VITALS — DIASTOLIC BLOOD PRESSURE: 54 MMHG | SYSTOLIC BLOOD PRESSURE: 90 MMHG

## 2018-02-18 VITALS — DIASTOLIC BLOOD PRESSURE: 51 MMHG | SYSTOLIC BLOOD PRESSURE: 96 MMHG

## 2018-02-18 VITALS — SYSTOLIC BLOOD PRESSURE: 121 MMHG | DIASTOLIC BLOOD PRESSURE: 71 MMHG

## 2018-02-18 VITALS — SYSTOLIC BLOOD PRESSURE: 106 MMHG | DIASTOLIC BLOOD PRESSURE: 62 MMHG

## 2018-02-18 VITALS — DIASTOLIC BLOOD PRESSURE: 68 MMHG | SYSTOLIC BLOOD PRESSURE: 120 MMHG

## 2018-02-18 VITALS — SYSTOLIC BLOOD PRESSURE: 123 MMHG | DIASTOLIC BLOOD PRESSURE: 74 MMHG

## 2018-02-18 VITALS — SYSTOLIC BLOOD PRESSURE: 118 MMHG | DIASTOLIC BLOOD PRESSURE: 65 MMHG

## 2018-02-18 VITALS — DIASTOLIC BLOOD PRESSURE: 72 MMHG | SYSTOLIC BLOOD PRESSURE: 122 MMHG

## 2018-02-18 VITALS — SYSTOLIC BLOOD PRESSURE: 120 MMHG | DIASTOLIC BLOOD PRESSURE: 72 MMHG

## 2018-02-18 VITALS — DIASTOLIC BLOOD PRESSURE: 53 MMHG | SYSTOLIC BLOOD PRESSURE: 85 MMHG

## 2018-02-18 VITALS — DIASTOLIC BLOOD PRESSURE: 53 MMHG | SYSTOLIC BLOOD PRESSURE: 86 MMHG

## 2018-02-18 VITALS — SYSTOLIC BLOOD PRESSURE: 85 MMHG | DIASTOLIC BLOOD PRESSURE: 53 MMHG

## 2018-02-18 VITALS — SYSTOLIC BLOOD PRESSURE: 96 MMHG | DIASTOLIC BLOOD PRESSURE: 57 MMHG

## 2018-02-18 VITALS — SYSTOLIC BLOOD PRESSURE: 109 MMHG | DIASTOLIC BLOOD PRESSURE: 63 MMHG

## 2018-02-18 VITALS — SYSTOLIC BLOOD PRESSURE: 118 MMHG | DIASTOLIC BLOOD PRESSURE: 98 MMHG

## 2018-02-18 VITALS — SYSTOLIC BLOOD PRESSURE: 103 MMHG | DIASTOLIC BLOOD PRESSURE: 64 MMHG

## 2018-02-18 VITALS — DIASTOLIC BLOOD PRESSURE: 65 MMHG | SYSTOLIC BLOOD PRESSURE: 107 MMHG

## 2018-02-18 VITALS — DIASTOLIC BLOOD PRESSURE: 72 MMHG | SYSTOLIC BLOOD PRESSURE: 128 MMHG

## 2018-02-18 VITALS — SYSTOLIC BLOOD PRESSURE: 123 MMHG | DIASTOLIC BLOOD PRESSURE: 77 MMHG

## 2018-02-18 VITALS — DIASTOLIC BLOOD PRESSURE: 63 MMHG | SYSTOLIC BLOOD PRESSURE: 105 MMHG

## 2018-02-18 VITALS — DIASTOLIC BLOOD PRESSURE: 64 MMHG | SYSTOLIC BLOOD PRESSURE: 101 MMHG

## 2018-02-18 VITALS — DIASTOLIC BLOOD PRESSURE: 54 MMHG | SYSTOLIC BLOOD PRESSURE: 91 MMHG

## 2018-02-18 VITALS — SYSTOLIC BLOOD PRESSURE: 109 MMHG | DIASTOLIC BLOOD PRESSURE: 67 MMHG

## 2018-02-18 VITALS — SYSTOLIC BLOOD PRESSURE: 124 MMHG | DIASTOLIC BLOOD PRESSURE: 77 MMHG

## 2018-02-18 VITALS — DIASTOLIC BLOOD PRESSURE: 70 MMHG | SYSTOLIC BLOOD PRESSURE: 107 MMHG

## 2018-02-18 VITALS — DIASTOLIC BLOOD PRESSURE: 62 MMHG | SYSTOLIC BLOOD PRESSURE: 99 MMHG

## 2018-02-18 VITALS — DIASTOLIC BLOOD PRESSURE: 76 MMHG | SYSTOLIC BLOOD PRESSURE: 123 MMHG

## 2018-02-18 VITALS — SYSTOLIC BLOOD PRESSURE: 102 MMHG | DIASTOLIC BLOOD PRESSURE: 65 MMHG

## 2018-02-18 VITALS — SYSTOLIC BLOOD PRESSURE: 93 MMHG | DIASTOLIC BLOOD PRESSURE: 58 MMHG

## 2018-02-18 VITALS — DIASTOLIC BLOOD PRESSURE: 83 MMHG | SYSTOLIC BLOOD PRESSURE: 144 MMHG

## 2018-02-18 VITALS — SYSTOLIC BLOOD PRESSURE: 146 MMHG | DIASTOLIC BLOOD PRESSURE: 80 MMHG

## 2018-02-18 VITALS — DIASTOLIC BLOOD PRESSURE: 67 MMHG | SYSTOLIC BLOOD PRESSURE: 115 MMHG

## 2018-02-18 VITALS — DIASTOLIC BLOOD PRESSURE: 68 MMHG | SYSTOLIC BLOOD PRESSURE: 133 MMHG

## 2018-02-18 VITALS — SYSTOLIC BLOOD PRESSURE: 91 MMHG | DIASTOLIC BLOOD PRESSURE: 49 MMHG

## 2018-02-18 VITALS — DIASTOLIC BLOOD PRESSURE: 70 MMHG | SYSTOLIC BLOOD PRESSURE: 138 MMHG

## 2018-02-18 VITALS — DIASTOLIC BLOOD PRESSURE: 76 MMHG | SYSTOLIC BLOOD PRESSURE: 124 MMHG

## 2018-02-18 VITALS — SYSTOLIC BLOOD PRESSURE: 124 MMHG | DIASTOLIC BLOOD PRESSURE: 74 MMHG

## 2018-02-18 VITALS — SYSTOLIC BLOOD PRESSURE: 105 MMHG | DIASTOLIC BLOOD PRESSURE: 56 MMHG

## 2018-02-18 VITALS — SYSTOLIC BLOOD PRESSURE: 140 MMHG | DIASTOLIC BLOOD PRESSURE: 82 MMHG

## 2018-02-18 VITALS — SYSTOLIC BLOOD PRESSURE: 102 MMHG | DIASTOLIC BLOOD PRESSURE: 61 MMHG

## 2018-02-18 VITALS — DIASTOLIC BLOOD PRESSURE: 64 MMHG | SYSTOLIC BLOOD PRESSURE: 111 MMHG

## 2018-02-18 VITALS — DIASTOLIC BLOOD PRESSURE: 59 MMHG | SYSTOLIC BLOOD PRESSURE: 92 MMHG

## 2018-02-18 VITALS — DIASTOLIC BLOOD PRESSURE: 59 MMHG | SYSTOLIC BLOOD PRESSURE: 116 MMHG

## 2018-02-18 VITALS — SYSTOLIC BLOOD PRESSURE: 118 MMHG | DIASTOLIC BLOOD PRESSURE: 76 MMHG

## 2018-02-18 VITALS — DIASTOLIC BLOOD PRESSURE: 50 MMHG | SYSTOLIC BLOOD PRESSURE: 82 MMHG

## 2018-02-18 VITALS — SYSTOLIC BLOOD PRESSURE: 105 MMHG | DIASTOLIC BLOOD PRESSURE: 61 MMHG

## 2018-02-18 VITALS — SYSTOLIC BLOOD PRESSURE: 101 MMHG | DIASTOLIC BLOOD PRESSURE: 65 MMHG

## 2018-02-18 VITALS — DIASTOLIC BLOOD PRESSURE: 58 MMHG | SYSTOLIC BLOOD PRESSURE: 85 MMHG

## 2018-02-18 VITALS — SYSTOLIC BLOOD PRESSURE: 129 MMHG | DIASTOLIC BLOOD PRESSURE: 68 MMHG

## 2018-02-18 VITALS — DIASTOLIC BLOOD PRESSURE: 60 MMHG | SYSTOLIC BLOOD PRESSURE: 104 MMHG

## 2018-02-18 VITALS — SYSTOLIC BLOOD PRESSURE: 111 MMHG | DIASTOLIC BLOOD PRESSURE: 60 MMHG

## 2018-02-18 VITALS — DIASTOLIC BLOOD PRESSURE: 70 MMHG | SYSTOLIC BLOOD PRESSURE: 110 MMHG

## 2018-02-18 VITALS — SYSTOLIC BLOOD PRESSURE: 106 MMHG | DIASTOLIC BLOOD PRESSURE: 59 MMHG

## 2018-02-18 VITALS — SYSTOLIC BLOOD PRESSURE: 100 MMHG | DIASTOLIC BLOOD PRESSURE: 55 MMHG

## 2018-02-18 VITALS — DIASTOLIC BLOOD PRESSURE: 87 MMHG | SYSTOLIC BLOOD PRESSURE: 96 MMHG

## 2018-02-18 LAB
BASE EXCESS BLDA CALC-SCNC: -2.9 MMOL/L
BASOPHILS # BLD AUTO: 0 /CMM (ref 0–0.2)
BASOPHILS NFR BLD AUTO: 0.1 % (ref 0–2)
BUN SERPL-MCNC: 43 MG/DL (ref 7–18)
CALCIUM SERPL-MCNC: 8.1 MG/DL (ref 8.5–10.1)
CHLORIDE SERPL-SCNC: 103 MMOL/L (ref 98–107)
CO2 SERPL-SCNC: 23 MMOL/L (ref 21–32)
CREAT SERPL-MCNC: 1.9 MG/DL (ref 0.6–1.3)
DO-HGB MFR BLDA: 64.8 MMHG
EOSINOPHIL # BLD AUTO: 0 /CMM (ref 0–0.7)
EOSINOPHIL NFR BLD AUTO: 0.1 % (ref 0–6)
GLUCOSE SERPL-MCNC: 134 MG/DL (ref 74–106)
HCT VFR BLD AUTO: 26 % (ref 33–45)
HGB BLD-MCNC: 8.7 G/DL (ref 11.5–14.8)
INHALED O2 CONCENTRATION: 30 %
LYMPHOCYTES NFR BLD AUTO: 0.4 /CMM (ref 0.8–4.8)
LYMPHOCYTES NFR BLD AUTO: 2.5 % (ref 20–44)
MAGNESIUM SERPL-MCNC: 1.5 MG/DL (ref 1.8–2.4)
MCH RBC QN AUTO: 30 PG (ref 26–33)
MCHC RBC AUTO-ENTMCNC: 34 G/DL (ref 31–36)
MCV RBC AUTO: 89 FL (ref 82–100)
MONOCYTES NFR BLD AUTO: 0.3 /CMM (ref 0.1–1.3)
MONOCYTES NFR BLD AUTO: 1.6 % (ref 2–12)
NEUTROPHILS # BLD AUTO: 15.7 /CMM (ref 1.8–8.9)
NEUTROPHILS NFR BLD AUTO: 95.7 % (ref 43–81)
PCO2 TEMP ADJ BLDA: 30.7 MMHG (ref 35–45)
PEEP SETTING VENT: 450 ML
PH TEMP ADJ BLDA: 7.44 [PH] (ref 7.35–7.45)
PHOSPHATE SERPL-MCNC: 3.2 MG/DL (ref 2.5–4.9)
PLATELET # BLD AUTO: 147 /CMM (ref 150–450)
PO2 TEMP ADJ BLDA: 113 MMHG (ref 75–100)
POTASSIUM SERPL-SCNC: 3.9 MMOL/L (ref 3.5–5.1)
RBC # BLD AUTO: 2.88 MIL/UL (ref 4–5.2)
RDW COEFFICIENT OF VARIATION: 19.5 (ref 11.5–15)
SAO2 % BLDA: 97.7 % (ref 92–98.5)
SET RATE, BG: 12
SODIUM SERPL-SCNC: 136 MMOL/L (ref 136–145)
VENTILATION MODE VENT: (no result)
WBC NRBC COR # BLD AUTO: 16.4 K/UL (ref 4.3–11)

## 2018-02-18 PROCEDURE — 5A1D70Z PERFORMANCE OF URINARY FILTRATION, INTERMITTENT, LESS THAN 6 HOURS PER DAY: ICD-10-PCS | Performed by: INTERNAL MEDICINE

## 2018-02-18 RX ADMIN — Medication PRN ML: at 05:47

## 2018-02-18 RX ADMIN — Medication SCH TAB: at 08:21

## 2018-02-18 RX ADMIN — Medication SCH APPLIC: at 11:31

## 2018-02-18 RX ADMIN — SODIUM CHLORIDE PRN MLS/HR: 9 INJECTION, SOLUTION INTRAVENOUS at 19:30

## 2018-02-18 RX ADMIN — Medication SCH MLS/HR: at 21:17

## 2018-02-18 RX ADMIN — MEROPENEM SCH MLS/HR: 500 INJECTION INTRAVENOUS at 08:21

## 2018-02-18 RX ADMIN — SODIUM CHLORIDE SCH MLS/HR: 9 INJECTION, SOLUTION INTRAVENOUS at 20:33

## 2018-02-18 RX ADMIN — HYDROCORTISONE SODIUM SUCCINATE SCH MG: 100 INJECTION, POWDER, FOR SOLUTION INTRAMUSCULAR; INTRAVASCULAR at 12:14

## 2018-02-18 RX ADMIN — MUPIROCIN SCH APPLIC: 20 OINTMENT TOPICAL at 12:14

## 2018-02-18 RX ADMIN — MEROPENEM SCH MLS/HR: 500 INJECTION INTRAVENOUS at 19:53

## 2018-02-18 RX ADMIN — MAGNESIUM SULFATE IN DEXTROSE SCH MLS/HR: 10 INJECTION, SOLUTION INTRAVENOUS at 16:00

## 2018-02-18 RX ADMIN — HYDROCORTISONE SODIUM SUCCINATE SCH MG: 100 INJECTION, POWDER, FOR SOLUTION INTRAMUSCULAR; INTRAVASCULAR at 17:38

## 2018-02-18 RX ADMIN — Medication SCH MLS/HR: at 09:48

## 2018-02-18 RX ADMIN — HYDROCORTISONE SODIUM SUCCINATE SCH MG: 100 INJECTION, POWDER, FOR SOLUTION INTRAMUSCULAR; INTRAVASCULAR at 08:21

## 2018-02-18 RX ADMIN — SODIUM CHLORIDE SCH MG: 9 INJECTION, SOLUTION INTRAVENOUS at 08:20

## 2018-02-18 RX ADMIN — SODIUM CHLORIDE SCH MLS/HR: 9 INJECTION, SOLUTION INTRAVENOUS at 09:06

## 2018-02-18 RX ADMIN — MUPIROCIN SCH APPLIC: 20 OINTMENT TOPICAL at 21:17

## 2018-02-18 RX ADMIN — MAGNESIUM SULFATE IN DEXTROSE SCH MLS/HR: 10 INJECTION, SOLUTION INTRAVENOUS at 16:57

## 2018-02-18 RX ADMIN — SODIUM CHLORIDE SCH MLS/HR: 9 INJECTION, SOLUTION INTRAVENOUS at 14:21

## 2018-02-18 NOTE — NUR
ICU RN

DURING EVENING CARE, PT WAS NOTED TO HAVE A DISLODGED FLEXISEAL. NEW FLEXISEAL WAS INSERTED 
DUE TO DIARRHEA AND SKIN PROTECTION. WILL CONTINUE TO MONITOR.

## 2018-02-18 NOTE — NUR
ICU RN- Dr. Sommer at bedside. Per md, give infusing NS @ 200 ml for 2 L only. Orders placed 
by md. Will continue to monitor.

## 2018-02-18 NOTE — NUR
ICU RN- HD nurse removed right femoral HD cath per Dr. Underwood's order. RN stated that pt 
will have no HD for 3 days per md. Charge nurse aware. Will continue to monitor.

## 2018-02-18 NOTE — NUR
PT RECEIVED TRACHED  VENT SHLY 6. NO RESP DISTRESS NOTED. PT TOLERATING VENT SETTINGS. 
SX'D FOR SML AMT OF TAN THICK SECRETIONS. VENT ALARMS SET AND AUDIBLE. AMBU BAG AT BEDSIDE. 
WILL CONTINUE TO MONITOR.

-------------------------------------------------------------------------------

Addendum: 02/18/18 at 2000 by ANDREE GRIJALVA RT

-------------------------------------------------------------------------------

Amended: Links added.

## 2018-02-18 NOTE — NUR
ICU RN- HD started. HD nurse at bedside.

1230- HD completed. HD nurse gave 200 mL (no fluids removed). Will continue to monitor.

## 2018-02-18 NOTE — NUR
ICU JONA Benavidez DNP at bedside. Per NP, once 2L NS completed, infuse NS @ 80 ml/hr. Also 
obtained order for Magnesium 2 gm for Mag level of 1.5. Orders placed. Will continue to 
monitor. 

-------------------------------------------------------------------------------

Addendum: 02/18/18 at 1937 by JAM TINSLEY RN

-------------------------------------------------------------------------------

In addition, obtained order to insert flexi-seal since pt has had liquid stool. Flexi-seal 
inserted.

## 2018-02-19 VITALS — DIASTOLIC BLOOD PRESSURE: 54 MMHG | SYSTOLIC BLOOD PRESSURE: 90 MMHG

## 2018-02-19 VITALS — DIASTOLIC BLOOD PRESSURE: 68 MMHG | SYSTOLIC BLOOD PRESSURE: 98 MMHG

## 2018-02-19 VITALS — SYSTOLIC BLOOD PRESSURE: 109 MMHG | DIASTOLIC BLOOD PRESSURE: 56 MMHG

## 2018-02-19 VITALS — DIASTOLIC BLOOD PRESSURE: 54 MMHG | SYSTOLIC BLOOD PRESSURE: 93 MMHG

## 2018-02-19 VITALS — DIASTOLIC BLOOD PRESSURE: 59 MMHG | SYSTOLIC BLOOD PRESSURE: 116 MMHG

## 2018-02-19 VITALS — SYSTOLIC BLOOD PRESSURE: 98 MMHG | DIASTOLIC BLOOD PRESSURE: 57 MMHG

## 2018-02-19 VITALS — DIASTOLIC BLOOD PRESSURE: 57 MMHG | SYSTOLIC BLOOD PRESSURE: 98 MMHG

## 2018-02-19 VITALS — DIASTOLIC BLOOD PRESSURE: 52 MMHG | SYSTOLIC BLOOD PRESSURE: 95 MMHG

## 2018-02-19 VITALS — SYSTOLIC BLOOD PRESSURE: 111 MMHG | DIASTOLIC BLOOD PRESSURE: 53 MMHG

## 2018-02-19 VITALS — SYSTOLIC BLOOD PRESSURE: 107 MMHG | DIASTOLIC BLOOD PRESSURE: 61 MMHG

## 2018-02-19 VITALS — DIASTOLIC BLOOD PRESSURE: 61 MMHG | SYSTOLIC BLOOD PRESSURE: 104 MMHG

## 2018-02-19 VITALS — SYSTOLIC BLOOD PRESSURE: 97 MMHG | DIASTOLIC BLOOD PRESSURE: 57 MMHG

## 2018-02-19 VITALS — SYSTOLIC BLOOD PRESSURE: 130 MMHG | DIASTOLIC BLOOD PRESSURE: 70 MMHG

## 2018-02-19 VITALS — DIASTOLIC BLOOD PRESSURE: 56 MMHG | SYSTOLIC BLOOD PRESSURE: 99 MMHG

## 2018-02-19 VITALS — DIASTOLIC BLOOD PRESSURE: 58 MMHG | SYSTOLIC BLOOD PRESSURE: 90 MMHG

## 2018-02-19 VITALS — SYSTOLIC BLOOD PRESSURE: 107 MMHG | DIASTOLIC BLOOD PRESSURE: 68 MMHG

## 2018-02-19 VITALS — SYSTOLIC BLOOD PRESSURE: 93 MMHG | DIASTOLIC BLOOD PRESSURE: 54 MMHG

## 2018-02-19 VITALS — DIASTOLIC BLOOD PRESSURE: 57 MMHG | SYSTOLIC BLOOD PRESSURE: 93 MMHG

## 2018-02-19 VITALS — SYSTOLIC BLOOD PRESSURE: 95 MMHG | DIASTOLIC BLOOD PRESSURE: 58 MMHG

## 2018-02-19 VITALS — SYSTOLIC BLOOD PRESSURE: 94 MMHG | DIASTOLIC BLOOD PRESSURE: 64 MMHG

## 2018-02-19 VITALS — DIASTOLIC BLOOD PRESSURE: 59 MMHG | SYSTOLIC BLOOD PRESSURE: 115 MMHG

## 2018-02-19 VITALS — DIASTOLIC BLOOD PRESSURE: 57 MMHG | SYSTOLIC BLOOD PRESSURE: 96 MMHG

## 2018-02-19 VITALS — DIASTOLIC BLOOD PRESSURE: 68 MMHG | SYSTOLIC BLOOD PRESSURE: 107 MMHG

## 2018-02-19 VITALS — DIASTOLIC BLOOD PRESSURE: 58 MMHG | SYSTOLIC BLOOD PRESSURE: 93 MMHG

## 2018-02-19 VITALS — SYSTOLIC BLOOD PRESSURE: 96 MMHG | DIASTOLIC BLOOD PRESSURE: 57 MMHG

## 2018-02-19 VITALS — DIASTOLIC BLOOD PRESSURE: 55 MMHG | SYSTOLIC BLOOD PRESSURE: 99 MMHG

## 2018-02-19 VITALS — SYSTOLIC BLOOD PRESSURE: 130 MMHG | DIASTOLIC BLOOD PRESSURE: 63 MMHG

## 2018-02-19 VITALS — SYSTOLIC BLOOD PRESSURE: 91 MMHG | DIASTOLIC BLOOD PRESSURE: 54 MMHG

## 2018-02-19 VITALS — SYSTOLIC BLOOD PRESSURE: 105 MMHG | DIASTOLIC BLOOD PRESSURE: 56 MMHG

## 2018-02-19 VITALS — DIASTOLIC BLOOD PRESSURE: 66 MMHG | SYSTOLIC BLOOD PRESSURE: 103 MMHG

## 2018-02-19 VITALS — SYSTOLIC BLOOD PRESSURE: 103 MMHG | DIASTOLIC BLOOD PRESSURE: 46 MMHG

## 2018-02-19 LAB
BASOPHILS # BLD AUTO: 0 /CMM (ref 0–0.2)
BASOPHILS NFR BLD AUTO: 0 % (ref 0–2)
BUN SERPL-MCNC: 33 MG/DL (ref 7–18)
CALCIUM SERPL-MCNC: 7.7 MG/DL (ref 8.5–10.1)
CHLORIDE SERPL-SCNC: 106 MMOL/L (ref 98–107)
CO2 SERPL-SCNC: 22 MMOL/L (ref 21–32)
CREAT SERPL-MCNC: 1.5 MG/DL (ref 0.6–1.3)
EOSINOPHIL # BLD AUTO: 0 /CMM (ref 0–0.7)
EOSINOPHIL NFR BLD AUTO: 0.1 % (ref 0–6)
GLUCOSE SERPL-MCNC: 134 MG/DL (ref 74–106)
HCT VFR BLD AUTO: 23 % (ref 33–45)
HGB BLD-MCNC: 7.8 G/DL (ref 11.5–14.8)
LYMPHOCYTES NFR BLD AUTO: 0.6 /CMM (ref 0.8–4.8)
LYMPHOCYTES NFR BLD AUTO: 3.7 % (ref 20–44)
MAGNESIUM SERPL-MCNC: 1.8 MG/DL (ref 1.8–2.4)
MCH RBC QN AUTO: 30 PG (ref 26–33)
MCHC RBC AUTO-ENTMCNC: 33 G/DL (ref 31–36)
MCV RBC AUTO: 90 FL (ref 82–100)
MONOCYTES NFR BLD AUTO: 0.1 /CMM (ref 0.1–1.3)
MONOCYTES NFR BLD AUTO: 0.8 % (ref 2–12)
NEUTROPHILS # BLD AUTO: 14.3 /CMM (ref 1.8–8.9)
NEUTROPHILS NFR BLD AUTO: 95.4 % (ref 43–81)
PHOSPHATE SERPL-MCNC: 2.8 MG/DL (ref 2.5–4.9)
PLATELET # BLD AUTO: 110 /CMM (ref 150–450)
POTASSIUM SERPL-SCNC: 3.1 MMOL/L (ref 3.5–5.1)
RBC # BLD AUTO: 2.6 MIL/UL (ref 4–5.2)
RDW COEFFICIENT OF VARIATION: 19.6 (ref 11.5–15)
SODIUM SERPL-SCNC: 139 MMOL/L (ref 136–145)
WBC NRBC COR # BLD AUTO: 14.9 K/UL (ref 4.3–11)

## 2018-02-19 RX ADMIN — METRONIDAZOLE SCH MG: 250 TABLET ORAL at 23:36

## 2018-02-19 RX ADMIN — POTASSIUM CHLORIDE SCH MLS/HR: 200 INJECTION, SOLUTION INTRAVENOUS at 09:10

## 2018-02-19 RX ADMIN — METRONIDAZOLE SCH MG: 250 TABLET ORAL at 17:59

## 2018-02-19 RX ADMIN — POTASSIUM CHLORIDE SCH MLS/HR: 200 INJECTION, SOLUTION INTRAVENOUS at 12:46

## 2018-02-19 RX ADMIN — HYDROCORTISONE SODIUM SUCCINATE SCH MG: 100 INJECTION, POWDER, FOR SOLUTION INTRAMUSCULAR; INTRAVASCULAR at 17:59

## 2018-02-19 RX ADMIN — SODIUM CHLORIDE PRN MLS/HR: 9 INJECTION, SOLUTION INTRAVENOUS at 11:01

## 2018-02-19 RX ADMIN — Medication SCH APPLIC: at 09:02

## 2018-02-19 RX ADMIN — MEROPENEM SCH MLS/HR: 500 INJECTION INTRAVENOUS at 07:50

## 2018-02-19 RX ADMIN — POTASSIUM CHLORIDE SCH MLS/HR: 200 INJECTION, SOLUTION INTRAVENOUS at 16:08

## 2018-02-19 RX ADMIN — POTASSIUM CHLORIDE SCH MLS/HR: 200 INJECTION, SOLUTION INTRAVENOUS at 11:01

## 2018-02-19 RX ADMIN — Medication PRN ML: at 01:50

## 2018-02-19 RX ADMIN — MUPIROCIN SCH APPLIC: 20 OINTMENT TOPICAL at 21:21

## 2018-02-19 RX ADMIN — METRONIDAZOLE SCH MG: 250 TABLET ORAL at 11:01

## 2018-02-19 RX ADMIN — Medication SCH MLS/HR: at 23:09

## 2018-02-19 RX ADMIN — HYDROCORTISONE SODIUM SUCCINATE SCH MG: 100 INJECTION, POWDER, FOR SOLUTION INTRAMUSCULAR; INTRAVASCULAR at 09:01

## 2018-02-19 RX ADMIN — POTASSIUM CHLORIDE SCH MLS/HR: 200 INJECTION, SOLUTION INTRAVENOUS at 10:09

## 2018-02-19 RX ADMIN — METRONIDAZOLE SCH MG: 250 TABLET ORAL at 00:06

## 2018-02-19 RX ADMIN — MEROPENEM SCH MLS/HR: 500 INJECTION INTRAVENOUS at 21:21

## 2018-02-19 RX ADMIN — Medication SCH TAB: at 09:02

## 2018-02-19 RX ADMIN — METRONIDAZOLE SCH MG: 250 TABLET ORAL at 05:59

## 2018-02-19 RX ADMIN — POTASSIUM CHLORIDE SCH MLS/HR: 200 INJECTION, SOLUTION INTRAVENOUS at 14:51

## 2018-02-19 RX ADMIN — SODIUM CHLORIDE SCH MG: 9 INJECTION, SOLUTION INTRAVENOUS at 11:24

## 2018-02-19 RX ADMIN — SODIUM CHLORIDE PRN MLS/HR: 9 INJECTION, SOLUTION INTRAVENOUS at 21:20

## 2018-02-19 RX ADMIN — Medication SCH MLS/HR: at 09:01

## 2018-02-19 RX ADMIN — HYDROCORTISONE SODIUM SUCCINATE SCH MG: 100 INJECTION, POWDER, FOR SOLUTION INTRAMUSCULAR; INTRAVASCULAR at 14:53

## 2018-02-19 RX ADMIN — SODIUM CHLORIDE SCH MLS/HR: 9 INJECTION, SOLUTION INTRAVENOUS at 21:51

## 2018-02-19 RX ADMIN — MUPIROCIN SCH APPLIC: 20 OINTMENT TOPICAL at 09:02

## 2018-02-19 NOTE — NUR
Female trach pt received on mechanical vent.  Pt trach is secure.  Vent is plugged into a 
red outlet, alarms are set and audible, and BVM is at bedside.  

-------------------------------------------------------------------------------

Addendum: 02/19/18 at 0720 by HUNTER TIWARI RT

-------------------------------------------------------------------------------

Amended: Links added.

## 2018-02-19 NOTE — NUR
RCVD PT ON VENT WITH NOTED SETTINGS.  SUCTIONED MODERATE AMOUNT OF YELLOW THICK SECRETIONS. 
VENT PLUGGED INTO RED OUTLET, VENT ALARM WORKING AND AUDIBLE. AMBU BAG AT BEDSIDE, WILL 
CONTINUE TO MONITOR THE PT.

## 2018-02-19 NOTE — NUR
RN [facility] NOTE:



RECEIVED PATIENT FROM ICU TO ROOM 117-1, ON STABLE CONDITION. ASLEEP, AND RESPONDS TO 
TACTILE STIMULI. RESPIRATION EVEN AND UNLABORED. VENT-TRACH DEPENDENT SATURATING 97%-99%. ON 
TELEMONITOR SR=69. NO FACIAL GRIMACING NOTED. HOB ELEVATED. GT FEEDING OF NOVASOURCE RENAL 
@35CC/HR, TOLERATING IT. 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  (R) 
INTERNAL JUGULAR IV CENTRAL LINE NOTED PATENT AND INTACT W/ TRANSPARENT DRESSING. NS@80ML/HR 
BEING INFUSED. NOTED W/ GENERALIZED EDEMA. FLEXISEAL IN PLACED W/ LIQUID BROWN STOOL 100CC 
NOTED. BED ALARM AND LOCKED AT ALL TIMES. WILL REPORT TO PM SHIFT NURSE FOR CONTINUITY OF 
CARE.

## 2018-02-19 NOTE — NUR
ICU RN- Report given to Soon charge nurse in [facility]. Pt to transfer to [facility].

1800- Pt transferred to [facility], Room 117-1 in stable condition.

## 2018-02-19 NOTE — NUR
RN NOTE



PATIENT'S TEMP NOTED TO BE 95.0 AXILLARY. RECHECKED AND UNABLE TO BE APPRECIATED VIA ORAL 
AND AXILLARY ROUTE. RECTAL TEMP CHECKED AND NOTED PATIENT'S TEMP TO BE 91.6. BEDBATH GIVEN. 
WOUND TREATMENT RENDERED. TRACH CARE DONE. FLEXISEAL SECURED AND FLUSHED, NOTED TO BE IN 
PLACE AND INTACT. PATIENT REPOSITIONED. WARM BLANKET AND BEAR HUGGER PLACED. WILL MONITOR 
CLOSELY.

## 2018-02-19 NOTE — NUR
RN NOTE



RECEIVED ENDORSEMENT FROM AM SHIFT. PATIENT IS ON CONTACT ISOLATION FOR VRE WOUND AND MRSA 
NARES. PATIENT WITH NO DISTRESS AS OBSERVED AND RESTING COMFORTABLY. ON MECH VENT VIA TRACH, 
TOLERATING WELL, SATURATION %. FLEXISEAL IN PLACE, NOTED TO BE LEAKING, WILL ASSESS 
AND PROVIDE PM CARE/BEDBATH AS SOON AS POSSIBLE. GTF AS ORDERED, TOLERATING WELL, NO 
RESIDUAL NOTED. R IJ TRIPLE LUMEN INTACT, IVF AS ORDERED. SAFETY AND COMFORT ENSURED. BED IN 
LOW AND LOCKED POSITION. WILL MONITOR CLOSELY.

## 2018-02-20 VITALS — SYSTOLIC BLOOD PRESSURE: 94 MMHG | DIASTOLIC BLOOD PRESSURE: 49 MMHG

## 2018-02-20 VITALS — SYSTOLIC BLOOD PRESSURE: 96 MMHG | DIASTOLIC BLOOD PRESSURE: 40 MMHG

## 2018-02-20 VITALS — SYSTOLIC BLOOD PRESSURE: 95 MMHG | DIASTOLIC BLOOD PRESSURE: 54 MMHG

## 2018-02-20 VITALS — DIASTOLIC BLOOD PRESSURE: 41 MMHG | SYSTOLIC BLOOD PRESSURE: 95 MMHG

## 2018-02-20 VITALS — DIASTOLIC BLOOD PRESSURE: 44 MMHG | SYSTOLIC BLOOD PRESSURE: 94 MMHG

## 2018-02-20 VITALS — SYSTOLIC BLOOD PRESSURE: 96 MMHG | DIASTOLIC BLOOD PRESSURE: 36 MMHG

## 2018-02-20 LAB
BASOPHILS # BLD AUTO: 0 /CMM (ref 0–0.2)
BASOPHILS NFR BLD AUTO: 0 % (ref 0–2)
BUN SERPL-MCNC: 41 MG/DL (ref 7–18)
CALCIUM SERPL-MCNC: 7.4 MG/DL (ref 8.5–10.1)
CHLORIDE SERPL-SCNC: 107 MMOL/L (ref 98–107)
CO2 SERPL-SCNC: 21 MMOL/L (ref 21–32)
CREAT SERPL-MCNC: 1.6 MG/DL (ref 0.6–1.3)
EOSINOPHIL # BLD AUTO: 0 /CMM (ref 0–0.7)
EOSINOPHIL NFR BLD AUTO: 0 % (ref 0–6)
GLUCOSE SERPL-MCNC: 95 MG/DL (ref 74–106)
HCT VFR BLD AUTO: 21 % (ref 33–45)
HGB BLD-MCNC: 7.2 G/DL (ref 11.5–14.8)
LYMPHOCYTES NFR BLD AUTO: 0.4 /CMM (ref 0.8–4.8)
LYMPHOCYTES NFR BLD AUTO: 2.7 % (ref 20–44)
MAGNESIUM SERPL-MCNC: 1.7 MG/DL (ref 1.8–2.4)
MCH RBC QN AUTO: 31 PG (ref 26–33)
MCHC RBC AUTO-ENTMCNC: 34 G/DL (ref 31–36)
MCV RBC AUTO: 90 FL (ref 82–100)
MONOCYTES NFR BLD AUTO: 0.3 /CMM (ref 0.1–1.3)
MONOCYTES NFR BLD AUTO: 2.3 % (ref 2–12)
NEUTROPHILS # BLD AUTO: 12.5 /CMM (ref 1.8–8.9)
NEUTROPHILS NFR BLD AUTO: 95 % (ref 43–81)
PHOSPHATE SERPL-MCNC: 3.2 MG/DL (ref 2.5–4.9)
PLATELET # BLD AUTO: 105 /CMM (ref 150–450)
POTASSIUM SERPL-SCNC: 3.4 MMOL/L (ref 3.5–5.1)
RBC # BLD AUTO: 2.34 MIL/UL (ref 4–5.2)
RDW COEFFICIENT OF VARIATION: 19.3 (ref 11.5–15)
SODIUM SERPL-SCNC: 140 MMOL/L (ref 136–145)
WBC NRBC COR # BLD AUTO: 13.1 K/UL (ref 4.3–11)

## 2018-02-20 RX ADMIN — Medication SCH MLS/HR: at 20:15

## 2018-02-20 RX ADMIN — MUPIROCIN SCH APPLIC: 20 OINTMENT TOPICAL at 20:16

## 2018-02-20 RX ADMIN — METRONIDAZOLE SCH MG: 250 TABLET ORAL at 17:30

## 2018-02-20 RX ADMIN — MUPIROCIN SCH APPLIC: 20 OINTMENT TOPICAL at 08:39

## 2018-02-20 RX ADMIN — Medication SCH MLS/HR: at 08:39

## 2018-02-20 RX ADMIN — METRONIDAZOLE SCH MG: 250 TABLET ORAL at 12:22

## 2018-02-20 RX ADMIN — SODIUM CHLORIDE PRN MLS/HR: 9 INJECTION, SOLUTION INTRAVENOUS at 12:24

## 2018-02-20 RX ADMIN — Medication SCH TAB: at 08:38

## 2018-02-20 RX ADMIN — Medication PRN ML: at 19:09

## 2018-02-20 RX ADMIN — HYDROCORTISONE SODIUM SUCCINATE SCH MG: 100 INJECTION, POWDER, FOR SOLUTION INTRAMUSCULAR; INTRAVASCULAR at 12:22

## 2018-02-20 RX ADMIN — SODIUM CHLORIDE SCH MG: 9 INJECTION, SOLUTION INTRAVENOUS at 08:38

## 2018-02-20 RX ADMIN — HYDROCORTISONE SODIUM SUCCINATE SCH MG: 100 INJECTION, POWDER, FOR SOLUTION INTRAMUSCULAR; INTRAVASCULAR at 17:30

## 2018-02-20 RX ADMIN — MEROPENEM SCH MLS/HR: 500 INJECTION INTRAVENOUS at 20:10

## 2018-02-20 RX ADMIN — MEROPENEM SCH MLS/HR: 500 INJECTION INTRAVENOUS at 08:37

## 2018-02-20 RX ADMIN — SODIUM CHLORIDE SCH MLS/HR: 9 INJECTION, SOLUTION INTRAVENOUS at 21:15

## 2018-02-20 RX ADMIN — METRONIDAZOLE SCH MG: 250 TABLET ORAL at 05:48

## 2018-02-20 RX ADMIN — Medication SCH APPLIC: at 08:39

## 2018-02-20 RX ADMIN — HYDROCORTISONE SODIUM SUCCINATE SCH MG: 100 INJECTION, POWDER, FOR SOLUTION INTRAMUSCULAR; INTRAVASCULAR at 08:38

## 2018-02-20 RX ADMIN — METRONIDAZOLE SCH MG: 250 TABLET ORAL at 23:08

## 2018-02-20 NOTE — NUR
RN NOTE



PATIENT WAS TURNED AND REPOSITIONED. UPON REPOSITIONING, PATIENT'S FLEXISEAL NOTED TO BE 
DISLODGED. ATTEMPTED TO REPLACE FLEXISEAL, HOWEVER WITH EACH INFLATION OF THE BALLOON, IT 
WAS NOTED THAT THE WHOLE BALLOON WITH FLUID INSIDE SLIDES OUT FROM PATIENT'S RECTUM. 
OBSERVED THAT PATIENT IS UNABLE TO HOLD THE FLEXISEAL BALLOON. CNLEONARDO, MADE AWARE. 
FLEXISEAL OPTED TO BE REMOVED AT THIS TIME. WILL ENDORSE ACCORDINGLY TO F/UP WITH MD. WILL 
KEEP PATIENT CLEAN AND DRY. DIARRHEA STILL ONGOING.

## 2018-02-20 NOTE — NUR
PT RECEIVED ON VENT WITH SETTINGS. NO RESPIRATORY DISTRESS NOTED ATT. VENT CHECKED PLUGGED 
INTO RED OUTLET, VENT ALARMS CHECKED FOUND TO BE ON AUDIBLE, AND WITHIN RANGE. BVM AT 
BEDSIDE. PT SX PRN

## 2018-02-20 NOTE — NUR
RN NOTE



PATIENT WITH NO DISTRESS. BEAR HUGGER IN PLACE. DIARRHEA ONGOING, CHANGED FREQUENTLY. GTF 
TOLERATED WELL. SR ON TELE. VENT TOLERATED WELL. WOUND TREATMENT RENDERED. TRACH CARE DONE. 
SAFETY AND COMFORT ENSURED. TURNED AND REPOSITIONED. WILL ENDORSE ACCORDINGLY FOR CONTINUITY 
OF CARE.

## 2018-02-20 NOTE — NUR
TD RN NOTE

SPOKE TO DR. TITUS REGARDING DAUGHTER CONCERNS WITH BREATHING. DR. TITUS ORDERED STAT ABG. 
RT NOTIFIED AS WELL AS DAUGHTER @ BEDSIDE.

## 2018-02-21 VITALS — DIASTOLIC BLOOD PRESSURE: 45 MMHG | SYSTOLIC BLOOD PRESSURE: 90 MMHG

## 2018-02-21 VITALS — SYSTOLIC BLOOD PRESSURE: 97 MMHG | DIASTOLIC BLOOD PRESSURE: 54 MMHG

## 2018-02-21 VITALS — DIASTOLIC BLOOD PRESSURE: 61 MMHG | SYSTOLIC BLOOD PRESSURE: 104 MMHG

## 2018-02-21 VITALS — DIASTOLIC BLOOD PRESSURE: 40 MMHG | SYSTOLIC BLOOD PRESSURE: 92 MMHG

## 2018-02-21 VITALS — DIASTOLIC BLOOD PRESSURE: 51 MMHG | SYSTOLIC BLOOD PRESSURE: 96 MMHG

## 2018-02-21 VITALS — DIASTOLIC BLOOD PRESSURE: 64 MMHG | SYSTOLIC BLOOD PRESSURE: 90 MMHG

## 2018-02-21 VITALS — DIASTOLIC BLOOD PRESSURE: 55 MMHG | SYSTOLIC BLOOD PRESSURE: 113 MMHG

## 2018-02-21 VITALS — DIASTOLIC BLOOD PRESSURE: 66 MMHG | SYSTOLIC BLOOD PRESSURE: 100 MMHG

## 2018-02-21 VITALS — SYSTOLIC BLOOD PRESSURE: 93 MMHG | DIASTOLIC BLOOD PRESSURE: 54 MMHG

## 2018-02-21 VITALS — DIASTOLIC BLOOD PRESSURE: 67 MMHG | SYSTOLIC BLOOD PRESSURE: 107 MMHG

## 2018-02-21 LAB
BASOPHILS # BLD AUTO: 0 /CMM (ref 0–0.2)
BASOPHILS NFR BLD AUTO: 0.1 % (ref 0–2)
BUN SERPL-MCNC: 56 MG/DL (ref 7–18)
CALCIUM SERPL-MCNC: 7.5 MG/DL (ref 8.5–10.1)
CHLORIDE SERPL-SCNC: 108 MMOL/L (ref 98–107)
CO2 SERPL-SCNC: 20 MMOL/L (ref 21–32)
CREAT SERPL-MCNC: 1.8 MG/DL (ref 0.6–1.3)
EOSINOPHIL # BLD AUTO: 0 /CMM (ref 0–0.7)
EOSINOPHIL NFR BLD AUTO: 0 % (ref 0–6)
GLUCOSE SERPL-MCNC: 116 MG/DL (ref 74–106)
HCT VFR BLD AUTO: 20 % (ref 33–45)
HGB BLD-MCNC: 6.8 G/DL (ref 11.5–14.8)
LYMPHOCYTES NFR BLD AUTO: 0.5 /CMM (ref 0.8–4.8)
LYMPHOCYTES NFR BLD AUTO: 3.2 % (ref 20–44)
LYMPHOCYTES NFR BLD MANUAL: 1 % (ref 16–48)
MAGNESIUM SERPL-MCNC: 1.8 MG/DL (ref 1.8–2.4)
MCH RBC QN AUTO: 30 PG (ref 26–33)
MCHC RBC AUTO-ENTMCNC: 34 G/DL (ref 31–36)
MCV RBC AUTO: 89 FL (ref 82–100)
MONOCYTES NFR BLD AUTO: 0.3 /CMM (ref 0.1–1.3)
MONOCYTES NFR BLD AUTO: 2.2 % (ref 2–12)
MONOCYTES NFR BLD MANUAL: 5 % (ref 0–11)
NEUTROPHILS # BLD AUTO: 13.2 /CMM (ref 1.8–8.9)
NEUTROPHILS NFR BLD AUTO: 94.5 % (ref 43–81)
NEUTS BAND NFR BLD MANUAL: 3 % (ref 0–5)
NEUTS SEG NFR BLD MANUAL: 91 % (ref 42–76)
PHOSPHATE SERPL-MCNC: 4 MG/DL (ref 2.5–4.9)
PLATELET # BLD AUTO: 111 /CMM (ref 150–450)
POTASSIUM SERPL-SCNC: 3.5 MMOL/L (ref 3.5–5.1)
RBC # BLD AUTO: 2.28 MIL/UL (ref 4–5.2)
RDW COEFFICIENT OF VARIATION: 19.7 (ref 11.5–15)
SODIUM SERPL-SCNC: 140 MMOL/L (ref 136–145)
WBC NRBC COR # BLD AUTO: 14 K/UL (ref 4.3–11)

## 2018-02-21 RX ADMIN — HYDROCORTISONE SODIUM SUCCINATE SCH MG: 100 INJECTION, POWDER, FOR SOLUTION INTRAMUSCULAR; INTRAVASCULAR at 17:29

## 2018-02-21 RX ADMIN — Medication PRN OZ: at 08:59

## 2018-02-21 RX ADMIN — MEROPENEM SCH MLS/HR: 500 INJECTION INTRAVENOUS at 08:58

## 2018-02-21 RX ADMIN — METRONIDAZOLE SCH MG: 250 TABLET ORAL at 17:29

## 2018-02-21 RX ADMIN — Medication SCH TAB: at 08:59

## 2018-02-21 RX ADMIN — SODIUM CHLORIDE SCH MG: 9 INJECTION, SOLUTION INTRAVENOUS at 08:56

## 2018-02-21 RX ADMIN — HYDROCORTISONE SODIUM SUCCINATE SCH MG: 100 INJECTION, POWDER, FOR SOLUTION INTRAMUSCULAR; INTRAVASCULAR at 12:29

## 2018-02-21 RX ADMIN — Medication SCH APPLIC: at 09:00

## 2018-02-21 RX ADMIN — MUPIROCIN SCH APPLIC: 20 OINTMENT TOPICAL at 09:01

## 2018-02-21 RX ADMIN — HYDROCORTISONE SODIUM SUCCINATE SCH MG: 100 INJECTION, POWDER, FOR SOLUTION INTRAMUSCULAR; INTRAVASCULAR at 08:56

## 2018-02-21 RX ADMIN — METRONIDAZOLE SCH MG: 250 TABLET ORAL at 05:02

## 2018-02-21 RX ADMIN — SODIUM CHLORIDE PRN MLS/HR: 9 INJECTION, SOLUTION INTRAVENOUS at 21:03

## 2018-02-21 RX ADMIN — MEROPENEM SCH MLS/HR: 500 INJECTION INTRAVENOUS at 20:29

## 2018-02-21 RX ADMIN — METRONIDAZOLE SCH MG: 250 TABLET ORAL at 12:30

## 2018-02-21 RX ADMIN — Medication SCH MLS/HR: at 08:58

## 2018-02-21 RX ADMIN — Medication SCH MLS/HR: at 21:03

## 2018-02-21 RX ADMIN — MUPIROCIN SCH APPLIC: 20 OINTMENT TOPICAL at 21:06

## 2018-02-21 RX ADMIN — SODIUM CHLORIDE SCH MLS/HR: 9 INJECTION, SOLUTION INTRAVENOUS at 19:41

## 2018-02-21 RX ADMIN — SODIUM CHLORIDE PRN MLS/HR: 9 INJECTION, SOLUTION INTRAVENOUS at 03:15

## 2018-02-21 NOTE — NUR
TELE RN NOTE

SPOKE TO MARTIR DURHAM N.P. CLARIFIED ORDERED ONLY 1 UNIT OF BLOOD FOR H/H 6.8. REPORT 
EXCORIATION OF PERIANAL AREA DUE TO DIARRHEA C-DIFF . PICTURE TAKEN AND PUT IN CHART ORDERED 
WOUND CONSULT. UNABLE TO USE FLEXI SEAL ON PATIENT DUE TO SPHINCTER MUSCLE HOLDING BALLOON 
IN PLACE.

## 2018-02-21 NOTE — NUR
RT



PT RECEIVED TRACHED ON THE VENT WITH NOTED SETTINGS. PT IS AWAKE BUT DOES NOT FOLLOW 
COMMANDS. VENT ALARMS ARE SET AND AUDIBLE WITH BVM BY BEDSIDE. MLT CUFF PRESSURE NOTED. VENT 
IS PLUGGED INTO RED OUTLET. NO RESPIRATORY DISTRESS NOTED AT THIS TIME, WILL CONTINUE TO 
MONITOR.

-------------------------------------------------------------------------------

Addendum: 02/21/18 at 1813 by PRATIK CABAN RT

-------------------------------------------------------------------------------

Amended: Links added.

## 2018-02-21 NOTE — NUR
RN INITIAL NOTES



RECEIVED PATIENT IN BED, EYES CLOSED, OBTUNDED AT BASELINE. TRACH MIDLINE AND INTACT, ON 
MECHANICAL VENTILATOR AT PRESCRIBED SETTINGS, TOLERATING WELL, FREE FROM ANY S/S OF 
RESPIRATORY DISTRESS. ON TELEMETRY MONITORING, REVEALING SINUS RHYTHM, HR = 71 AT THIS TIME. 
NOTED WITH RIJ TRIPLE LUMEN CATHETER, ALL PORTS FLUSHING WELL, NOTED WITH GOOD VENOUS 
RETURN. GT PATENT AND INTACT, RUNNING NOVASOURCE RENAL @ 35ML/HR, TOLERATING WELL, NO 
GASTRIC RESIDUALS. CALL LIGHT LEFT WITHIN REACH, BED IN LOWEST AND LOCKED POSITION. WILL 
CONTINUE TO CLOSELY MONITOR. ISOLATION PRECAUTIONS STRICTLY OBSERVED

## 2018-02-21 NOTE — NUR
TAYO RN NOTE

CALLED DR. CELAYA REPORTED H/H 6.8. ORDERED 2 UNITS PRBC. PT IS ON LINE HOLIDAY MD AWARE.

## 2018-02-21 NOTE — NUR
TELE  RN NOTE

S/P 1 UNIT PRBC. PT TOLERATED UNIT WELL. NO REACTIONS NOTED THROUGHOUT PRBC TRANSFUSION. V/S 
STABLE TO PT BASELINE. NO SOB NOTED.

## 2018-02-22 VITALS — DIASTOLIC BLOOD PRESSURE: 60 MMHG | SYSTOLIC BLOOD PRESSURE: 105 MMHG

## 2018-02-22 VITALS — SYSTOLIC BLOOD PRESSURE: 108 MMHG | DIASTOLIC BLOOD PRESSURE: 52 MMHG

## 2018-02-22 VITALS — DIASTOLIC BLOOD PRESSURE: 50 MMHG | SYSTOLIC BLOOD PRESSURE: 101 MMHG

## 2018-02-22 VITALS — DIASTOLIC BLOOD PRESSURE: 58 MMHG | SYSTOLIC BLOOD PRESSURE: 119 MMHG

## 2018-02-22 VITALS — SYSTOLIC BLOOD PRESSURE: 117 MMHG | DIASTOLIC BLOOD PRESSURE: 65 MMHG

## 2018-02-22 VITALS — DIASTOLIC BLOOD PRESSURE: 62 MMHG | SYSTOLIC BLOOD PRESSURE: 107 MMHG

## 2018-02-22 LAB
BASE EXCESS BLDA CALC-SCNC: -9.5 MMOL/L
BASOPHILS # BLD AUTO: 0 /CMM (ref 0–0.2)
BASOPHILS NFR BLD AUTO: 0 % (ref 0–2)
BUN SERPL-MCNC: 70 MG/DL (ref 7–18)
CALCIUM SERPL-MCNC: 7.4 MG/DL (ref 8.5–10.1)
CHLORIDE SERPL-SCNC: 109 MMOL/L (ref 98–107)
CO2 SERPL-SCNC: 19 MMOL/L (ref 21–32)
CREAT SERPL-MCNC: 1.9 MG/DL (ref 0.6–1.3)
DO-HGB MFR BLDA: 77.5 MMHG
EOSINOPHIL # BLD AUTO: 0 /CMM (ref 0–0.7)
EOSINOPHIL NFR BLD AUTO: 0 % (ref 0–6)
GLUCOSE SERPL-MCNC: 103 MG/DL (ref 74–106)
HCT VFR BLD AUTO: 22 % (ref 33–45)
HGB BLD-MCNC: 7.5 G/DL (ref 11.5–14.8)
INHALED O2 CONCENTRATION: 30 %
INTRINSIC PEEP RESPIRATORY: 5 CM H2O
LYMPHOCYTES NFR BLD AUTO: 0.5 /CMM (ref 0.8–4.8)
LYMPHOCYTES NFR BLD AUTO: 3.7 % (ref 20–44)
LYMPHOCYTES NFR BLD MANUAL: 1 % (ref 16–48)
MAGNESIUM SERPL-MCNC: 1.8 MG/DL (ref 1.8–2.4)
MCH RBC QN AUTO: 31 PG (ref 26–33)
MCHC RBC AUTO-ENTMCNC: 34 G/DL (ref 31–36)
MCV RBC AUTO: 90 FL (ref 82–100)
MONOCYTES NFR BLD AUTO: 0.1 /CMM (ref 0.1–1.3)
MONOCYTES NFR BLD AUTO: 0.8 % (ref 2–12)
MONOCYTES NFR BLD MANUAL: 1 % (ref 0–11)
NEUTROPHILS # BLD AUTO: 12.5 /CMM (ref 1.8–8.9)
NEUTROPHILS NFR BLD AUTO: 95.5 % (ref 43–81)
NEUTS BAND NFR BLD MANUAL: 7 % (ref 0–5)
NEUTS SEG NFR BLD MANUAL: 91 % (ref 42–76)
PCO2 TEMP ADJ BLDA: 29.9 MMHG (ref 35–45)
PEEP SETTING VENT: 450 ML
PH TEMP ADJ BLDA: 7.33 [PH] (ref 7.35–7.45)
PHOSPHATE SERPL-MCNC: 5 MG/DL (ref 2.5–4.9)
PLATELET # BLD AUTO: 98 /CMM (ref 150–450)
PO2 TEMP ADJ BLDA: 101.3 MMHG (ref 75–100)
POTASSIUM SERPL-SCNC: 3.5 MMOL/L (ref 3.5–5.1)
RBC # BLD AUTO: 2.41 MIL/UL (ref 4–5.2)
RDW COEFFICIENT OF VARIATION: 18.2 (ref 11.5–15)
SAO2 % BLDA: 96.8 % (ref 92–98.5)
SET RATE, BG: 12
SODIUM SERPL-SCNC: 141 MMOL/L (ref 136–145)
WBC NRBC COR # BLD AUTO: 13.1 K/UL (ref 4.3–11)

## 2018-02-22 RX ADMIN — HYDROCORTISONE SODIUM SUCCINATE SCH MG: 100 INJECTION, POWDER, FOR SOLUTION INTRAMUSCULAR; INTRAVASCULAR at 08:21

## 2018-02-22 RX ADMIN — SODIUM CHLORIDE SCH MLS/HR: 9 INJECTION, SOLUTION INTRAVENOUS at 20:30

## 2018-02-22 RX ADMIN — MUPIROCIN SCH APPLIC: 20 OINTMENT TOPICAL at 21:26

## 2018-02-22 RX ADMIN — Medication SCH TAB: at 08:21

## 2018-02-22 RX ADMIN — Medication SCH MLS/HR: at 09:45

## 2018-02-22 RX ADMIN — SODIUM CITRATE AND CITRIC ACID MONOHYDRATE SCH ML: 500; 334 SOLUTION ORAL at 21:25

## 2018-02-22 RX ADMIN — MEROPENEM SCH MLS/HR: 500 INJECTION INTRAVENOUS at 20:41

## 2018-02-22 RX ADMIN — MUPIROCIN SCH APPLIC: 20 OINTMENT TOPICAL at 08:21

## 2018-02-22 RX ADMIN — SODIUM CITRATE AND CITRIC ACID MONOHYDRATE SCH ML: 500; 334 SOLUTION ORAL at 16:13

## 2018-02-22 RX ADMIN — Medication SCH MLS/HR: at 21:26

## 2018-02-22 RX ADMIN — HYDROCORTISONE SODIUM SUCCINATE SCH MG: 100 INJECTION, POWDER, FOR SOLUTION INTRAMUSCULAR; INTRAVASCULAR at 16:14

## 2018-02-22 RX ADMIN — Medication SCH APPLIC: at 08:21

## 2018-02-22 RX ADMIN — SODIUM CHLORIDE SCH MG: 9 INJECTION, SOLUTION INTRAVENOUS at 08:20

## 2018-02-22 RX ADMIN — MEROPENEM SCH MLS/HR: 500 INJECTION INTRAVENOUS at 08:20

## 2018-02-22 RX ADMIN — SODIUM CITRATE AND CITRIC ACID MONOHYDRATE SCH ML: 500; 334 SOLUTION ORAL at 13:43

## 2018-02-22 NOTE — NUR
RN CLOSING NOTES



PATIENT RESTING IN BED, APPEARS COMFORTABLE, HAD 3 EPISODES OF DIARRHEA THROUGHOUT SHIFT, 
WOUND CARE RENDERED. LEFT PATIENT CLEAN AND DRY. WILL ENDORSE THE PATIENT TO THE AM SHIFT 
NURSE FOR MAISHA

## 2018-02-22 NOTE — NUR
RN NOTES

RECEIVED PT FROM NIGHT SHIFT, CHRONIC VENT/TRACH DEPENDENT, OBTUNDED. TOLERATING VENT 
SETTINGS NO SOB OR DISTRESS NOTED. SR ON THE TELE ISSA HR 85. GTF AT 35ML/HR. RIGHT IJ WITH 
IVF AT 80ML/HR.  BED LOCKED AND IN LOWEST POSITION, CALL LIGHT WITHIN REACH, SIDE RAILS 
UPX3. WILL CONT TO ISSA.

## 2018-02-22 NOTE — NUR
WOUND CARE CONSULT   WOUND CARE RECEIVED CONSULT FOR PERIANAL EXCORIATION DUE TO DIARRHEA.  
WOUND CARE WILL DEFER TO SURGICAL TEAM WHO ARE CURRENTLY FOLLOWING.

## 2018-02-22 NOTE — NUR
TELE RN INITIAL NOTE



PT RECEIVED SLEEPING IN BED. OBTUNDED. ON MECH VENT WITH SETTINGS WELL TOLERATED AND 
SATURATING WELL. TELE- SINUS RHYTHM 80'S. GTUBE FEEDING WELL TOLERATED WITHOUT RESIDUALS 
NOTED. HOB ELEVATED AND ON ASPIRATION PRECAUTIONS. IV RIJ TLC CLEAN, DRY AND PATENT WITH 
FLUIDS INFUSING. ISOLATION PRECAUTIONS OBSERVED. WILL CONTINUE TO MONITOR.

## 2018-02-22 NOTE — NUR
PT RECEIVED TRACHED ON THE VENT WITH NOTED SETTIGNS. VENT ALARMS ARE SET AND AUDIBLE WITH 
BVM BY BEDSIDE. MLT CUFF PRESSURE NOTED. VENT IS PLUGGED INTO RED OUTLET. NO RESPIRATORY 
DISTRESS NOTED AT THIS TIME, WILL CONTINUE TO MONITOR.

-------------------------------------------------------------------------------

Addendum: 02/22/18 at 0143 by GOMEZ VILLEGAS RT

-------------------------------------------------------------------------------

Amended: Links added.

## 2018-02-23 VITALS — SYSTOLIC BLOOD PRESSURE: 92 MMHG | DIASTOLIC BLOOD PRESSURE: 46 MMHG

## 2018-02-23 VITALS — SYSTOLIC BLOOD PRESSURE: 103 MMHG | DIASTOLIC BLOOD PRESSURE: 57 MMHG

## 2018-02-23 VITALS — SYSTOLIC BLOOD PRESSURE: 109 MMHG | DIASTOLIC BLOOD PRESSURE: 58 MMHG

## 2018-02-23 VITALS — SYSTOLIC BLOOD PRESSURE: 94 MMHG | DIASTOLIC BLOOD PRESSURE: 53 MMHG

## 2018-02-23 VITALS — DIASTOLIC BLOOD PRESSURE: 68 MMHG | SYSTOLIC BLOOD PRESSURE: 132 MMHG

## 2018-02-23 VITALS — SYSTOLIC BLOOD PRESSURE: 98 MMHG | DIASTOLIC BLOOD PRESSURE: 53 MMHG

## 2018-02-23 VITALS — DIASTOLIC BLOOD PRESSURE: 55 MMHG | SYSTOLIC BLOOD PRESSURE: 94 MMHG

## 2018-02-23 VITALS — SYSTOLIC BLOOD PRESSURE: 84 MMHG | DIASTOLIC BLOOD PRESSURE: 42 MMHG

## 2018-02-23 VITALS — DIASTOLIC BLOOD PRESSURE: 39 MMHG | SYSTOLIC BLOOD PRESSURE: 84 MMHG

## 2018-02-23 VITALS — DIASTOLIC BLOOD PRESSURE: 37 MMHG | SYSTOLIC BLOOD PRESSURE: 68 MMHG

## 2018-02-23 VITALS — DIASTOLIC BLOOD PRESSURE: 67 MMHG | SYSTOLIC BLOOD PRESSURE: 115 MMHG

## 2018-02-23 VITALS — SYSTOLIC BLOOD PRESSURE: 68 MMHG | DIASTOLIC BLOOD PRESSURE: 31 MMHG

## 2018-02-23 VITALS — DIASTOLIC BLOOD PRESSURE: 45 MMHG | SYSTOLIC BLOOD PRESSURE: 95 MMHG

## 2018-02-23 VITALS — DIASTOLIC BLOOD PRESSURE: 54 MMHG | SYSTOLIC BLOOD PRESSURE: 97 MMHG

## 2018-02-23 VITALS — DIASTOLIC BLOOD PRESSURE: 50 MMHG | SYSTOLIC BLOOD PRESSURE: 91 MMHG

## 2018-02-23 VITALS — SYSTOLIC BLOOD PRESSURE: 79 MMHG | DIASTOLIC BLOOD PRESSURE: 40 MMHG

## 2018-02-23 VITALS — DIASTOLIC BLOOD PRESSURE: 61 MMHG | SYSTOLIC BLOOD PRESSURE: 112 MMHG

## 2018-02-23 VITALS — SYSTOLIC BLOOD PRESSURE: 85 MMHG | DIASTOLIC BLOOD PRESSURE: 45 MMHG

## 2018-02-23 VITALS — SYSTOLIC BLOOD PRESSURE: 123 MMHG | DIASTOLIC BLOOD PRESSURE: 69 MMHG

## 2018-02-23 VITALS — DIASTOLIC BLOOD PRESSURE: 49 MMHG | SYSTOLIC BLOOD PRESSURE: 88 MMHG

## 2018-02-23 VITALS — DIASTOLIC BLOOD PRESSURE: 53 MMHG | SYSTOLIC BLOOD PRESSURE: 99 MMHG

## 2018-02-23 VITALS — DIASTOLIC BLOOD PRESSURE: 44 MMHG | SYSTOLIC BLOOD PRESSURE: 81 MMHG

## 2018-02-23 VITALS — DIASTOLIC BLOOD PRESSURE: 34 MMHG | SYSTOLIC BLOOD PRESSURE: 85 MMHG

## 2018-02-23 VITALS — SYSTOLIC BLOOD PRESSURE: 145 MMHG | DIASTOLIC BLOOD PRESSURE: 74 MMHG

## 2018-02-23 VITALS — SYSTOLIC BLOOD PRESSURE: 105 MMHG | DIASTOLIC BLOOD PRESSURE: 50 MMHG

## 2018-02-23 VITALS — SYSTOLIC BLOOD PRESSURE: 91 MMHG | DIASTOLIC BLOOD PRESSURE: 49 MMHG

## 2018-02-23 VITALS — SYSTOLIC BLOOD PRESSURE: 63 MMHG | DIASTOLIC BLOOD PRESSURE: 34 MMHG

## 2018-02-23 VITALS — DIASTOLIC BLOOD PRESSURE: 32 MMHG | SYSTOLIC BLOOD PRESSURE: 72 MMHG

## 2018-02-23 VITALS — DIASTOLIC BLOOD PRESSURE: 68 MMHG | SYSTOLIC BLOOD PRESSURE: 148 MMHG

## 2018-02-23 VITALS — DIASTOLIC BLOOD PRESSURE: 77 MMHG | SYSTOLIC BLOOD PRESSURE: 122 MMHG

## 2018-02-23 VITALS — DIASTOLIC BLOOD PRESSURE: 43 MMHG | SYSTOLIC BLOOD PRESSURE: 95 MMHG

## 2018-02-23 VITALS — DIASTOLIC BLOOD PRESSURE: 48 MMHG | SYSTOLIC BLOOD PRESSURE: 91 MMHG

## 2018-02-23 VITALS — DIASTOLIC BLOOD PRESSURE: 46 MMHG | SYSTOLIC BLOOD PRESSURE: 83 MMHG

## 2018-02-23 VITALS — SYSTOLIC BLOOD PRESSURE: 84 MMHG | DIASTOLIC BLOOD PRESSURE: 38 MMHG

## 2018-02-23 VITALS — DIASTOLIC BLOOD PRESSURE: 50 MMHG | SYSTOLIC BLOOD PRESSURE: 94 MMHG

## 2018-02-23 VITALS — SYSTOLIC BLOOD PRESSURE: 99 MMHG | DIASTOLIC BLOOD PRESSURE: 55 MMHG

## 2018-02-23 VITALS — SYSTOLIC BLOOD PRESSURE: 68 MMHG | DIASTOLIC BLOOD PRESSURE: 27 MMHG

## 2018-02-23 VITALS — SYSTOLIC BLOOD PRESSURE: 70 MMHG | DIASTOLIC BLOOD PRESSURE: 27 MMHG

## 2018-02-23 LAB
BASE EXCESS BLDA CALC-SCNC: -13.6 MMOL/L
BASOPHILS # BLD AUTO: 0 /CMM (ref 0–0.2)
BASOPHILS NFR BLD AUTO: 0.1 % (ref 0–2)
BUN SERPL-MCNC: 85 MG/DL (ref 7–18)
CALCIUM SERPL-MCNC: 7.4 MG/DL (ref 8.5–10.1)
CHLORIDE SERPL-SCNC: 111 MMOL/L (ref 98–107)
CO2 SERPL-SCNC: 15 MMOL/L (ref 21–32)
CREAT SERPL-MCNC: 2 MG/DL (ref 0.6–1.3)
DO-HGB MFR BLDA: 65.6 MMHG
EOSINOPHIL # BLD AUTO: 0.1 /CMM (ref 0–0.7)
EOSINOPHIL NFR BLD AUTO: 0.6 % (ref 0–6)
GLUCOSE SERPL-MCNC: 109 MG/DL (ref 74–106)
HCT VFR BLD AUTO: 20 % (ref 33–45)
HGB BLD-MCNC: 7 G/DL (ref 11.5–14.8)
INHALED O2 CONCENTRATION: 30 %
INTRINSIC PEEP RESPIRATORY: 0 CM H2O
LYMPHOCYTES NFR BLD AUTO: 0.8 /CMM (ref 0.8–4.8)
LYMPHOCYTES NFR BLD AUTO: 5.9 % (ref 20–44)
LYMPHOCYTES NFR BLD MANUAL: 3 % (ref 16–48)
MAGNESIUM SERPL-MCNC: 1.9 MG/DL (ref 1.8–2.4)
MCH RBC QN AUTO: 31 PG (ref 26–33)
MCHC RBC AUTO-ENTMCNC: 34 G/DL (ref 31–36)
MCV RBC AUTO: 90 FL (ref 82–100)
MONOCYTES NFR BLD AUTO: 0.3 /CMM (ref 0.1–1.3)
MONOCYTES NFR BLD AUTO: 1.9 % (ref 2–12)
MONOCYTES NFR BLD MANUAL: 2 % (ref 0–11)
NEUTROPHILS # BLD AUTO: 12.7 /CMM (ref 1.8–8.9)
NEUTROPHILS NFR BLD AUTO: 91.5 % (ref 43–81)
NEUTS SEG NFR BLD MANUAL: 95 % (ref 42–76)
PCO2 TEMP ADJ BLDA: 33.8 MMHG (ref 35–45)
PH TEMP ADJ BLDA: 7.21 [PH] (ref 7.35–7.45)
PHOSPHATE SERPL-MCNC: 5.6 MG/DL (ref 2.5–4.9)
PLATELET # BLD AUTO: 87 /CMM (ref 150–450)
PO2 TEMP ADJ BLDA: 108.6 MMHG (ref 75–100)
POTASSIUM SERPL-SCNC: 3.1 MMOL/L (ref 3.5–5.1)
RBC # BLD AUTO: 2.26 MIL/UL (ref 4–5.2)
RDW COEFFICIENT OF VARIATION: 18 (ref 11.5–15)
SAO2 % BLDA: 96.6 % (ref 92–98.5)
SODIUM SERPL-SCNC: 144 MMOL/L (ref 136–145)
VENTILATION MODE VENT: (no result)
WBC NRBC COR # BLD AUTO: 13.9 K/UL (ref 4.3–11)

## 2018-02-23 RX ADMIN — MUPIROCIN SCH APPLIC: 20 OINTMENT TOPICAL at 08:03

## 2018-02-23 RX ADMIN — Medication SCH MLS/HR: at 08:02

## 2018-02-23 RX ADMIN — DEXTROSE MONOHYDRATE PRN MLS/HR: 50 INJECTION, SOLUTION INTRAVENOUS at 14:51

## 2018-02-23 RX ADMIN — SODIUM CITRATE AND CITRIC ACID MONOHYDRATE SCH ML: 500; 334 SOLUTION ORAL at 13:06

## 2018-02-23 RX ADMIN — Medication SCH TAB: at 08:03

## 2018-02-23 RX ADMIN — MUPIROCIN SCH APPLIC: 20 OINTMENT TOPICAL at 20:24

## 2018-02-23 RX ADMIN — SODIUM CITRATE AND CITRIC ACID MONOHYDRATE SCH ML: 500; 334 SOLUTION ORAL at 17:13

## 2018-02-23 RX ADMIN — HYDROCORTISONE SODIUM SUCCINATE SCH MG: 100 INJECTION, POWDER, FOR SOLUTION INTRAMUSCULAR; INTRAVASCULAR at 20:24

## 2018-02-23 RX ADMIN — Medication SCH MLS/HR: at 20:22

## 2018-02-23 RX ADMIN — SODIUM CHLORIDE SCH MLS/HR: 9 INJECTION, SOLUTION INTRAVENOUS at 20:21

## 2018-02-23 RX ADMIN — SODIUM CITRATE AND CITRIC ACID MONOHYDRATE SCH ML: 500; 334 SOLUTION ORAL at 20:24

## 2018-02-23 RX ADMIN — MEROPENEM SCH MLS/HR: 500 INJECTION INTRAVENOUS at 20:21

## 2018-02-23 RX ADMIN — Medication SCH APPLIC: at 08:04

## 2018-02-23 RX ADMIN — MEROPENEM SCH MLS/HR: 500 INJECTION INTRAVENOUS at 08:01

## 2018-02-23 RX ADMIN — SODIUM CITRATE AND CITRIC ACID MONOHYDRATE SCH ML: 500; 334 SOLUTION ORAL at 08:02

## 2018-02-23 RX ADMIN — HYDROCORTISONE SODIUM SUCCINATE SCH MG: 100 INJECTION, POWDER, FOR SOLUTION INTRAMUSCULAR; INTRAVASCULAR at 08:02

## 2018-02-23 RX ADMIN — Medication PRN ML: at 06:01

## 2018-02-23 RX ADMIN — SODIUM CHLORIDE SCH MG: 9 INJECTION, SOLUTION INTRAVENOUS at 08:03

## 2018-02-23 NOTE — NUR
RT

POST ABG RESULTS PER DR TITUS RR INCREASED TO 24.

-------------------------------------------------------------------------------

Addendum: 02/23/18 at 1513 by MARIA FERNANDA TIAN RT

-------------------------------------------------------------------------------

Amended: Links added.

## 2018-02-23 NOTE — NUR
ICU RN NOTE



1400: Received patient from [facility] for low BP, SBP 60's. Transferred via bed, placed on the 
monitor. Noted with SR 80's. SBP >100 at this time. With blood transfusion ongoing, no any 
adverse reactions per previous nurse. With trache to vent, tolerated. No respiratory 
distress noted at this time. Placed flat for now for BP. GT intact. feeding on hold. No 
residuals noted. Placed on iso prec for VRE and acineto on sacral wound. RIJ TLC intact.



1430: Daughter Stephany visited, made aware for patient's condition and POC, all questions and 
concerns were answered.



1440: Done with blood transfusion, tolerated well. SBP 80's. S/E by Dr. Shelby;eg, with order to 
start on Levophed.



1500: Started on 2mcg, will titrate as ordered. Placed HOB elevated, restarted GT feeding.



1600: Noted BP >130's, turned off Levo, will continue to monitor.



1830: Changed patient, still noted with loose brown stool as endorsed. Will continue to 
monitor. Will endorse to next shift. Off pressor, SBP >90's. GT feeding tolerated.

## 2018-02-23 NOTE — NUR
TELE RN NOTES



RECEIVED PT ON BED OBTUNDED. ON TELE MONITOR SR. [facility] SETTING SATURATING 100. NO 
SIGN OF RESPI DISTRESS. IV ACCESS ON R IJ NS @ 80ML/HR. NO SIGN OF REDNESS OR PAIN. HEAD OF 
BED ELEVATED. SIDE RAILS UP. CALL LIGHT WITHIN REACH. BED ALARM ON. WILL CONTINUE TO MONITOR 
PT CLOSELY.

## 2018-02-23 NOTE — NUR
TELE RN NOTES



DR SOULEYMANE GALLEGO NOTIFIED REGARDING PT HGB IS 7 AND LOW BLOOD PRESSURE OF 74/34MMHG

## 2018-02-23 NOTE — NUR
TELE RN CLOSING NOTE



PT REMAINED STABLE DURING SHIFT. NO ACUTE DISTRESS NOTED. VENT SETTINGS WELL TOLERATED. KEPT 
CLEAN AND DRY. HOB ELEVATED. ISOLATION PRECAUTIONS OBSERVED. SUCTIONED AS NEEDED. 
REPOSITIONED Q2H. WILL ENDORSE TO NEXT SHIFT FOR CONTINUITY OF CARE.

## 2018-02-24 VITALS — SYSTOLIC BLOOD PRESSURE: 105 MMHG | DIASTOLIC BLOOD PRESSURE: 61 MMHG

## 2018-02-24 VITALS — SYSTOLIC BLOOD PRESSURE: 114 MMHG | DIASTOLIC BLOOD PRESSURE: 68 MMHG

## 2018-02-24 VITALS — SYSTOLIC BLOOD PRESSURE: 174 MMHG | DIASTOLIC BLOOD PRESSURE: 82 MMHG

## 2018-02-24 VITALS — SYSTOLIC BLOOD PRESSURE: 104 MMHG | DIASTOLIC BLOOD PRESSURE: 69 MMHG

## 2018-02-24 VITALS — DIASTOLIC BLOOD PRESSURE: 53 MMHG | SYSTOLIC BLOOD PRESSURE: 101 MMHG

## 2018-02-24 VITALS — SYSTOLIC BLOOD PRESSURE: 118 MMHG | DIASTOLIC BLOOD PRESSURE: 60 MMHG

## 2018-02-24 VITALS — SYSTOLIC BLOOD PRESSURE: 104 MMHG | DIASTOLIC BLOOD PRESSURE: 56 MMHG

## 2018-02-24 VITALS — SYSTOLIC BLOOD PRESSURE: 123 MMHG | DIASTOLIC BLOOD PRESSURE: 69 MMHG

## 2018-02-24 VITALS — SYSTOLIC BLOOD PRESSURE: 113 MMHG | DIASTOLIC BLOOD PRESSURE: 65 MMHG

## 2018-02-24 VITALS — SYSTOLIC BLOOD PRESSURE: 114 MMHG | DIASTOLIC BLOOD PRESSURE: 62 MMHG

## 2018-02-24 VITALS — DIASTOLIC BLOOD PRESSURE: 55 MMHG | SYSTOLIC BLOOD PRESSURE: 107 MMHG

## 2018-02-24 VITALS — DIASTOLIC BLOOD PRESSURE: 57 MMHG | SYSTOLIC BLOOD PRESSURE: 106 MMHG

## 2018-02-24 VITALS — DIASTOLIC BLOOD PRESSURE: 55 MMHG | SYSTOLIC BLOOD PRESSURE: 101 MMHG

## 2018-02-24 VITALS — SYSTOLIC BLOOD PRESSURE: 112 MMHG | DIASTOLIC BLOOD PRESSURE: 62 MMHG

## 2018-02-24 VITALS — DIASTOLIC BLOOD PRESSURE: 52 MMHG | SYSTOLIC BLOOD PRESSURE: 107 MMHG

## 2018-02-24 VITALS — SYSTOLIC BLOOD PRESSURE: 106 MMHG | DIASTOLIC BLOOD PRESSURE: 50 MMHG

## 2018-02-24 VITALS — DIASTOLIC BLOOD PRESSURE: 66 MMHG | SYSTOLIC BLOOD PRESSURE: 113 MMHG

## 2018-02-24 VITALS — SYSTOLIC BLOOD PRESSURE: 81 MMHG | DIASTOLIC BLOOD PRESSURE: 48 MMHG

## 2018-02-24 VITALS — DIASTOLIC BLOOD PRESSURE: 60 MMHG | SYSTOLIC BLOOD PRESSURE: 109 MMHG

## 2018-02-24 VITALS — DIASTOLIC BLOOD PRESSURE: 58 MMHG | SYSTOLIC BLOOD PRESSURE: 107 MMHG

## 2018-02-24 VITALS — SYSTOLIC BLOOD PRESSURE: 116 MMHG | DIASTOLIC BLOOD PRESSURE: 67 MMHG

## 2018-02-24 VITALS — SYSTOLIC BLOOD PRESSURE: 116 MMHG | DIASTOLIC BLOOD PRESSURE: 65 MMHG

## 2018-02-24 VITALS — DIASTOLIC BLOOD PRESSURE: 52 MMHG | SYSTOLIC BLOOD PRESSURE: 105 MMHG

## 2018-02-24 VITALS — SYSTOLIC BLOOD PRESSURE: 105 MMHG | DIASTOLIC BLOOD PRESSURE: 58 MMHG

## 2018-02-24 VITALS — SYSTOLIC BLOOD PRESSURE: 117 MMHG | DIASTOLIC BLOOD PRESSURE: 62 MMHG

## 2018-02-24 VITALS — DIASTOLIC BLOOD PRESSURE: 66 MMHG | SYSTOLIC BLOOD PRESSURE: 119 MMHG

## 2018-02-24 VITALS — SYSTOLIC BLOOD PRESSURE: 115 MMHG | DIASTOLIC BLOOD PRESSURE: 69 MMHG

## 2018-02-24 VITALS — DIASTOLIC BLOOD PRESSURE: 48 MMHG | SYSTOLIC BLOOD PRESSURE: 81 MMHG

## 2018-02-24 VITALS — DIASTOLIC BLOOD PRESSURE: 49 MMHG | SYSTOLIC BLOOD PRESSURE: 78 MMHG

## 2018-02-24 VITALS — DIASTOLIC BLOOD PRESSURE: 64 MMHG | SYSTOLIC BLOOD PRESSURE: 117 MMHG

## 2018-02-24 VITALS — SYSTOLIC BLOOD PRESSURE: 99 MMHG | DIASTOLIC BLOOD PRESSURE: 53 MMHG

## 2018-02-24 VITALS — DIASTOLIC BLOOD PRESSURE: 65 MMHG | SYSTOLIC BLOOD PRESSURE: 111 MMHG

## 2018-02-24 VITALS — SYSTOLIC BLOOD PRESSURE: 101 MMHG | DIASTOLIC BLOOD PRESSURE: 53 MMHG

## 2018-02-24 VITALS — DIASTOLIC BLOOD PRESSURE: 63 MMHG | SYSTOLIC BLOOD PRESSURE: 106 MMHG

## 2018-02-24 VITALS — DIASTOLIC BLOOD PRESSURE: 54 MMHG | SYSTOLIC BLOOD PRESSURE: 95 MMHG

## 2018-02-24 VITALS — SYSTOLIC BLOOD PRESSURE: 110 MMHG | DIASTOLIC BLOOD PRESSURE: 64 MMHG

## 2018-02-24 VITALS — DIASTOLIC BLOOD PRESSURE: 45 MMHG | SYSTOLIC BLOOD PRESSURE: 96 MMHG

## 2018-02-24 VITALS — SYSTOLIC BLOOD PRESSURE: 118 MMHG | DIASTOLIC BLOOD PRESSURE: 52 MMHG

## 2018-02-24 VITALS — DIASTOLIC BLOOD PRESSURE: 55 MMHG | SYSTOLIC BLOOD PRESSURE: 88 MMHG

## 2018-02-24 VITALS — DIASTOLIC BLOOD PRESSURE: 58 MMHG | SYSTOLIC BLOOD PRESSURE: 105 MMHG

## 2018-02-24 VITALS — SYSTOLIC BLOOD PRESSURE: 97 MMHG | DIASTOLIC BLOOD PRESSURE: 49 MMHG

## 2018-02-24 VITALS — SYSTOLIC BLOOD PRESSURE: 117 MMHG | DIASTOLIC BLOOD PRESSURE: 64 MMHG

## 2018-02-24 VITALS — SYSTOLIC BLOOD PRESSURE: 113 MMHG | DIASTOLIC BLOOD PRESSURE: 66 MMHG

## 2018-02-24 VITALS — SYSTOLIC BLOOD PRESSURE: 118 MMHG | DIASTOLIC BLOOD PRESSURE: 62 MMHG

## 2018-02-24 VITALS — SYSTOLIC BLOOD PRESSURE: 107 MMHG | DIASTOLIC BLOOD PRESSURE: 55 MMHG

## 2018-02-24 VITALS — DIASTOLIC BLOOD PRESSURE: 66 MMHG | SYSTOLIC BLOOD PRESSURE: 108 MMHG

## 2018-02-24 VITALS — SYSTOLIC BLOOD PRESSURE: 92 MMHG | DIASTOLIC BLOOD PRESSURE: 61 MMHG

## 2018-02-24 VITALS — SYSTOLIC BLOOD PRESSURE: 108 MMHG | DIASTOLIC BLOOD PRESSURE: 57 MMHG

## 2018-02-24 VITALS — DIASTOLIC BLOOD PRESSURE: 53 MMHG | SYSTOLIC BLOOD PRESSURE: 98 MMHG

## 2018-02-24 VITALS — DIASTOLIC BLOOD PRESSURE: 58 MMHG | SYSTOLIC BLOOD PRESSURE: 111 MMHG

## 2018-02-24 VITALS — DIASTOLIC BLOOD PRESSURE: 55 MMHG | SYSTOLIC BLOOD PRESSURE: 103 MMHG

## 2018-02-24 VITALS — DIASTOLIC BLOOD PRESSURE: 70 MMHG | SYSTOLIC BLOOD PRESSURE: 111 MMHG

## 2018-02-24 VITALS — DIASTOLIC BLOOD PRESSURE: 57 MMHG | SYSTOLIC BLOOD PRESSURE: 102 MMHG

## 2018-02-24 VITALS — SYSTOLIC BLOOD PRESSURE: 108 MMHG | DIASTOLIC BLOOD PRESSURE: 62 MMHG

## 2018-02-24 VITALS — SYSTOLIC BLOOD PRESSURE: 119 MMHG | DIASTOLIC BLOOD PRESSURE: 58 MMHG

## 2018-02-24 VITALS — SYSTOLIC BLOOD PRESSURE: 109 MMHG | DIASTOLIC BLOOD PRESSURE: 60 MMHG

## 2018-02-24 LAB
BASE EXCESS BLDA CALC-SCNC: -15.2 MMOL/L
BASOPHILS # BLD AUTO: 0.1 /CMM (ref 0–0.2)
BASOPHILS NFR BLD AUTO: 0.7 % (ref 0–2)
BILIRUB DIRECT SERPL-MCNC: 0.5 MG/DL (ref 0–0.2)
BILIRUB SERPL-MCNC: 1 MG/DL (ref 0.2–1)
BUN SERPL-MCNC: 93 MG/DL (ref 7–18)
CALCIUM SERPL-MCNC: 7.7 MG/DL (ref 8.5–10.1)
CHLORIDE SERPL-SCNC: 111 MMOL/L (ref 98–107)
CO2 SERPL-SCNC: 13 MMOL/L (ref 21–32)
CREAT SERPL-MCNC: 2.2 MG/DL (ref 0.6–1.3)
DO-HGB MFR BLDA: 36.7 MMHG
EOSINOPHIL # BLD AUTO: 0 /CMM (ref 0–0.7)
EOSINOPHIL NFR BLD AUTO: 0.2 % (ref 0–6)
GLUCOSE SERPL-MCNC: 85 MG/DL (ref 74–106)
HCT VFR BLD AUTO: 24 % (ref 33–45)
HGB BLD-MCNC: 8.2 G/DL (ref 11.5–14.8)
INHALED O2 CONCENTRATION: 30 %
LYMPHOCYTES NFR BLD AUTO: 0.4 /CMM (ref 0.8–4.8)
LYMPHOCYTES NFR BLD AUTO: 4.2 % (ref 20–44)
LYMPHOCYTES NFR BLD MANUAL: 4 % (ref 16–48)
MAGNESIUM SERPL-MCNC: 1.8 MG/DL (ref 1.8–2.4)
MCH RBC QN AUTO: 31 PG (ref 26–33)
MCHC RBC AUTO-ENTMCNC: 34 G/DL (ref 31–36)
MCV RBC AUTO: 90 FL (ref 82–100)
MONOCYTES NFR BLD AUTO: 0.1 /CMM (ref 0.1–1.3)
MONOCYTES NFR BLD AUTO: 1.2 % (ref 2–12)
MONOCYTES NFR BLD MANUAL: 2 % (ref 0–11)
NEUTROPHILS # BLD AUTO: 9.7 /CMM (ref 1.8–8.9)
NEUTROPHILS NFR BLD AUTO: 93.7 % (ref 43–81)
NEUTS BAND NFR BLD MANUAL: 3 % (ref 0–5)
NEUTS SEG NFR BLD MANUAL: 91 % (ref 42–76)
PCO2 TEMP ADJ BLDA: 17.1 MMHG (ref 35–45)
PEEP SETTING VENT: 450 ML
PH TEMP ADJ BLDA: 7.33 [PH] (ref 7.35–7.45)
PHOSPHATE SERPL-MCNC: 5.8 MG/DL (ref 2.5–4.9)
PLATELET # BLD AUTO: 68 /CMM (ref 150–450)
PO2 TEMP ADJ BLDA: 157.1 MMHG (ref 75–100)
POTASSIUM SERPL-SCNC: 3.1 MMOL/L (ref 3.5–5.1)
RBC # BLD AUTO: 2.64 MIL/UL (ref 4–5.2)
RDW COEFFICIENT OF VARIATION: 16.9 (ref 11.5–15)
SAO2 % BLDA: 98 % (ref 92–98.5)
SET RATE, BG: 24
SODIUM SERPL-SCNC: 143 MMOL/L (ref 136–145)
WBC NRBC COR # BLD AUTO: 10.4 K/UL (ref 4.3–11)

## 2018-02-24 RX ADMIN — DEXTROSE MONOHYDRATE PRN MLS/HR: 50 INJECTION, SOLUTION INTRAVENOUS at 20:12

## 2018-02-24 RX ADMIN — Medication SCH APPLIC: at 09:26

## 2018-02-24 RX ADMIN — HYDROCORTISONE SODIUM SUCCINATE SCH MG: 100 INJECTION, POWDER, FOR SOLUTION INTRAMUSCULAR; INTRAVASCULAR at 04:37

## 2018-02-24 RX ADMIN — SODIUM CITRATE AND CITRIC ACID MONOHYDRATE SCH ML: 500; 334 SOLUTION ORAL at 12:04

## 2018-02-24 RX ADMIN — MUPIROCIN SCH APPLIC: 20 OINTMENT TOPICAL at 09:27

## 2018-02-24 RX ADMIN — SODIUM CITRATE AND CITRIC ACID MONOHYDRATE SCH ML: 500; 334 SOLUTION ORAL at 17:23

## 2018-02-24 RX ADMIN — LINEZOLID SCH MG: 600 TABLET, FILM COATED ORAL at 20:51

## 2018-02-24 RX ADMIN — MEROPENEM SCH MLS/HR: 500 INJECTION INTRAVENOUS at 20:50

## 2018-02-24 RX ADMIN — HYDROCORTISONE SODIUM SUCCINATE SCH MG: 100 INJECTION, POWDER, FOR SOLUTION INTRAMUSCULAR; INTRAVASCULAR at 20:51

## 2018-02-24 RX ADMIN — SODIUM CHLORIDE PRN MLS/HR: 9 INJECTION, SOLUTION INTRAVENOUS at 09:25

## 2018-02-24 RX ADMIN — SODIUM CITRATE AND CITRIC ACID MONOHYDRATE SCH ML: 500; 334 SOLUTION ORAL at 20:50

## 2018-02-24 RX ADMIN — DEXTROSE MONOHYDRATE PRN MG: 50 INJECTION, SOLUTION INTRAVENOUS at 12:47

## 2018-02-24 RX ADMIN — Medication SCH MLS/HR: at 09:26

## 2018-02-24 RX ADMIN — MEROPENEM SCH MLS/HR: 500 INJECTION INTRAVENOUS at 08:06

## 2018-02-24 RX ADMIN — HYDROCORTISONE SODIUM SUCCINATE SCH MG: 100 INJECTION, POWDER, FOR SOLUTION INTRAMUSCULAR; INTRAVASCULAR at 12:05

## 2018-02-24 RX ADMIN — MUPIROCIN SCH APPLIC: 20 OINTMENT TOPICAL at 20:58

## 2018-02-24 RX ADMIN — Medication SCH TAB: at 09:25

## 2018-02-24 RX ADMIN — SODIUM CITRATE AND CITRIC ACID MONOHYDRATE SCH ML: 500; 334 SOLUTION ORAL at 09:25

## 2018-02-24 RX ADMIN — SODIUM CHLORIDE SCH MG: 9 INJECTION, SOLUTION INTRAVENOUS at 20:51

## 2018-02-24 RX ADMIN — SODIUM CHLORIDE SCH MLS/HR: 9 INJECTION, SOLUTION INTRAVENOUS at 22:03

## 2018-02-24 NOTE — NUR
RT



PT FOUND ON [facility] VENT WITH NOTED SETTING. VENT ALARMS SET AND AUDIBLE, VENT TO RED OUTLET. 
AMBU BAG AT [facility]. NO SOB OR RESP DISTRESS NOTED ON SHIFT.

-------------------------------------------------------------------------------

Addendum: 02/24/18 at 0531 by ERNST AMOS RT

-------------------------------------------------------------------------------

Amended: Links added.

## 2018-02-24 NOTE — NUR
pt received on vent via charted route and settings. ambu bag at bedside alarms set and 
audible. disconnect alarms checked. vent plugged into red outlet. tolerating vent settings 
at ths time. will continue to monitor

-------------------------------------------------------------------------------

Addendum: 02/24/18 at 2131 by KARTIK KIM RT

-------------------------------------------------------------------------------

Amended: Links added.

## 2018-02-24 NOTE — NUR
RN/ICU NOTES:



LAB CALLED W/ LACTIC ACID 7.8. TRENDING DOWN FROM 8.2. BOLUS  ML GIVEN AS PER ORDER.

## 2018-02-24 NOTE — NUR
RN/ ICU NOTES:



RECEIVED PT. IN BED W/ HOB ELEVATED . OBTUNDED. NONVERBAL. RESPONSIVE TO VERBAL TACTILE 
ONLY. ON MECHANICAL VENTILATOR TOLERATING SETTING WELL. NO FACIAL GRIMACES OR MOANING NOTED. 
MAINTAINED ASPIRATION AND ISOLATION PRECAUTION. ON GTF CLAMPED. PT. HAD 2 EPISODES OF EMESIS 
AS PER AM NURSE. WILL RESUME FEEDING LATER. ON TELE MONITOR SR. W/ RIJ TLC PATENT AND INTACT 
W/ DRESSING C/D/I W/ NO S/S OF INFECTION/INFILTRATION NOTED. W/ NS @ 125 ML/HR GOING. WILL 
CONTINUE TO MONITOR.

## 2018-02-24 NOTE — NUR
RN NOTES 



RECEIVED PATIENT ON MECH VENT WITH BREATHING NORMAL, EVEN AND UNLABORED. NO SOB NOTED. NO 
ACUTE DISTRESS NOTED. VENT SETTING REVIEWED AND VERIFIED. TOLERATED WELL. AFEBRILE. TELE 
MONITOR REVEALS SR. RIJ IS PATENT AND INTACT. ON GT FEED. POSITIVE PLACEMENT. ASPIRATION 
PRECAUTION TAKEN. HOB ELEVATED. KEPT CLEAN, DRY AND COMFORTABLE. ALL NEEDS ATTENDED. SAFETY 
MEASURE OBSERVED. CALL LIGHT WITH IN REACH. WILL CONT TO MONITOR.

## 2018-02-25 VITALS — SYSTOLIC BLOOD PRESSURE: 107 MMHG | DIASTOLIC BLOOD PRESSURE: 51 MMHG

## 2018-02-25 VITALS — SYSTOLIC BLOOD PRESSURE: 89 MMHG | DIASTOLIC BLOOD PRESSURE: 34 MMHG

## 2018-02-25 VITALS — DIASTOLIC BLOOD PRESSURE: 55 MMHG | SYSTOLIC BLOOD PRESSURE: 100 MMHG

## 2018-02-25 VITALS — SYSTOLIC BLOOD PRESSURE: 105 MMHG | DIASTOLIC BLOOD PRESSURE: 49 MMHG

## 2018-02-25 VITALS — DIASTOLIC BLOOD PRESSURE: 69 MMHG | SYSTOLIC BLOOD PRESSURE: 95 MMHG

## 2018-02-25 VITALS — SYSTOLIC BLOOD PRESSURE: 93 MMHG | DIASTOLIC BLOOD PRESSURE: 50 MMHG

## 2018-02-25 VITALS — SYSTOLIC BLOOD PRESSURE: 82 MMHG | DIASTOLIC BLOOD PRESSURE: 34 MMHG

## 2018-02-25 VITALS — SYSTOLIC BLOOD PRESSURE: 103 MMHG | DIASTOLIC BLOOD PRESSURE: 43 MMHG

## 2018-02-25 VITALS — DIASTOLIC BLOOD PRESSURE: 46 MMHG | SYSTOLIC BLOOD PRESSURE: 109 MMHG

## 2018-02-25 VITALS — SYSTOLIC BLOOD PRESSURE: 95 MMHG | DIASTOLIC BLOOD PRESSURE: 59 MMHG

## 2018-02-25 VITALS — DIASTOLIC BLOOD PRESSURE: 54 MMHG | SYSTOLIC BLOOD PRESSURE: 96 MMHG

## 2018-02-25 VITALS — SYSTOLIC BLOOD PRESSURE: 103 MMHG | DIASTOLIC BLOOD PRESSURE: 41 MMHG

## 2018-02-25 VITALS — SYSTOLIC BLOOD PRESSURE: 90 MMHG | DIASTOLIC BLOOD PRESSURE: 59 MMHG

## 2018-02-25 VITALS — SYSTOLIC BLOOD PRESSURE: 90 MMHG | DIASTOLIC BLOOD PRESSURE: 50 MMHG

## 2018-02-25 VITALS — DIASTOLIC BLOOD PRESSURE: 49 MMHG | SYSTOLIC BLOOD PRESSURE: 75 MMHG

## 2018-02-25 VITALS — DIASTOLIC BLOOD PRESSURE: 60 MMHG | SYSTOLIC BLOOD PRESSURE: 144 MMHG

## 2018-02-25 VITALS — SYSTOLIC BLOOD PRESSURE: 101 MMHG | DIASTOLIC BLOOD PRESSURE: 52 MMHG

## 2018-02-25 VITALS — DIASTOLIC BLOOD PRESSURE: 38 MMHG | SYSTOLIC BLOOD PRESSURE: 97 MMHG

## 2018-02-25 VITALS — SYSTOLIC BLOOD PRESSURE: 103 MMHG | DIASTOLIC BLOOD PRESSURE: 59 MMHG

## 2018-02-25 VITALS — SYSTOLIC BLOOD PRESSURE: 107 MMHG | DIASTOLIC BLOOD PRESSURE: 21 MMHG

## 2018-02-25 VITALS — DIASTOLIC BLOOD PRESSURE: 56 MMHG | SYSTOLIC BLOOD PRESSURE: 88 MMHG

## 2018-02-25 VITALS — SYSTOLIC BLOOD PRESSURE: 92 MMHG | DIASTOLIC BLOOD PRESSURE: 59 MMHG

## 2018-02-25 VITALS — DIASTOLIC BLOOD PRESSURE: 54 MMHG | SYSTOLIC BLOOD PRESSURE: 111 MMHG

## 2018-02-25 VITALS — SYSTOLIC BLOOD PRESSURE: 94 MMHG | DIASTOLIC BLOOD PRESSURE: 37 MMHG

## 2018-02-25 VITALS — DIASTOLIC BLOOD PRESSURE: 55 MMHG | SYSTOLIC BLOOD PRESSURE: 111 MMHG

## 2018-02-25 VITALS — DIASTOLIC BLOOD PRESSURE: 59 MMHG | SYSTOLIC BLOOD PRESSURE: 103 MMHG

## 2018-02-25 VITALS — DIASTOLIC BLOOD PRESSURE: 61 MMHG | SYSTOLIC BLOOD PRESSURE: 98 MMHG

## 2018-02-25 VITALS — DIASTOLIC BLOOD PRESSURE: 56 MMHG | SYSTOLIC BLOOD PRESSURE: 90 MMHG

## 2018-02-25 VITALS — DIASTOLIC BLOOD PRESSURE: 47 MMHG | SYSTOLIC BLOOD PRESSURE: 86 MMHG

## 2018-02-25 VITALS — DIASTOLIC BLOOD PRESSURE: 49 MMHG | SYSTOLIC BLOOD PRESSURE: 93 MMHG

## 2018-02-25 VITALS — DIASTOLIC BLOOD PRESSURE: 38 MMHG | SYSTOLIC BLOOD PRESSURE: 65 MMHG

## 2018-02-25 VITALS — DIASTOLIC BLOOD PRESSURE: 63 MMHG | SYSTOLIC BLOOD PRESSURE: 94 MMHG

## 2018-02-25 VITALS — DIASTOLIC BLOOD PRESSURE: 71 MMHG | SYSTOLIC BLOOD PRESSURE: 85 MMHG

## 2018-02-25 VITALS — SYSTOLIC BLOOD PRESSURE: 92 MMHG | DIASTOLIC BLOOD PRESSURE: 36 MMHG

## 2018-02-25 VITALS — SYSTOLIC BLOOD PRESSURE: 102 MMHG | DIASTOLIC BLOOD PRESSURE: 52 MMHG

## 2018-02-25 VITALS — SYSTOLIC BLOOD PRESSURE: 102 MMHG | DIASTOLIC BLOOD PRESSURE: 48 MMHG

## 2018-02-25 VITALS — DIASTOLIC BLOOD PRESSURE: 40 MMHG | SYSTOLIC BLOOD PRESSURE: 99 MMHG

## 2018-02-25 VITALS — SYSTOLIC BLOOD PRESSURE: 88 MMHG | DIASTOLIC BLOOD PRESSURE: 58 MMHG

## 2018-02-25 VITALS — DIASTOLIC BLOOD PRESSURE: 46 MMHG | SYSTOLIC BLOOD PRESSURE: 89 MMHG

## 2018-02-25 VITALS — SYSTOLIC BLOOD PRESSURE: 106 MMHG | DIASTOLIC BLOOD PRESSURE: 54 MMHG

## 2018-02-25 VITALS — SYSTOLIC BLOOD PRESSURE: 96 MMHG | DIASTOLIC BLOOD PRESSURE: 47 MMHG

## 2018-02-25 VITALS — SYSTOLIC BLOOD PRESSURE: 89 MMHG | DIASTOLIC BLOOD PRESSURE: 46 MMHG

## 2018-02-25 VITALS — DIASTOLIC BLOOD PRESSURE: 53 MMHG | SYSTOLIC BLOOD PRESSURE: 100 MMHG

## 2018-02-25 VITALS — DIASTOLIC BLOOD PRESSURE: 54 MMHG | SYSTOLIC BLOOD PRESSURE: 100 MMHG

## 2018-02-25 VITALS — SYSTOLIC BLOOD PRESSURE: 91 MMHG | DIASTOLIC BLOOD PRESSURE: 59 MMHG

## 2018-02-25 VITALS — DIASTOLIC BLOOD PRESSURE: 50 MMHG | SYSTOLIC BLOOD PRESSURE: 114 MMHG

## 2018-02-25 VITALS — DIASTOLIC BLOOD PRESSURE: 46 MMHG | SYSTOLIC BLOOD PRESSURE: 96 MMHG

## 2018-02-25 VITALS — SYSTOLIC BLOOD PRESSURE: 96 MMHG | DIASTOLIC BLOOD PRESSURE: 63 MMHG

## 2018-02-25 VITALS — DIASTOLIC BLOOD PRESSURE: 68 MMHG | SYSTOLIC BLOOD PRESSURE: 115 MMHG

## 2018-02-25 VITALS — SYSTOLIC BLOOD PRESSURE: 100 MMHG | DIASTOLIC BLOOD PRESSURE: 60 MMHG

## 2018-02-25 VITALS — DIASTOLIC BLOOD PRESSURE: 47 MMHG | SYSTOLIC BLOOD PRESSURE: 96 MMHG

## 2018-02-25 VITALS — SYSTOLIC BLOOD PRESSURE: 106 MMHG | DIASTOLIC BLOOD PRESSURE: 43 MMHG

## 2018-02-25 VITALS — SYSTOLIC BLOOD PRESSURE: 97 MMHG | DIASTOLIC BLOOD PRESSURE: 76 MMHG

## 2018-02-25 VITALS — DIASTOLIC BLOOD PRESSURE: 41 MMHG | SYSTOLIC BLOOD PRESSURE: 109 MMHG

## 2018-02-25 VITALS — DIASTOLIC BLOOD PRESSURE: 57 MMHG | SYSTOLIC BLOOD PRESSURE: 98 MMHG

## 2018-02-25 VITALS — DIASTOLIC BLOOD PRESSURE: 26 MMHG | SYSTOLIC BLOOD PRESSURE: 101 MMHG

## 2018-02-25 VITALS — SYSTOLIC BLOOD PRESSURE: 88 MMHG | DIASTOLIC BLOOD PRESSURE: 56 MMHG

## 2018-02-25 VITALS — DIASTOLIC BLOOD PRESSURE: 52 MMHG | SYSTOLIC BLOOD PRESSURE: 108 MMHG

## 2018-02-25 VITALS — SYSTOLIC BLOOD PRESSURE: 107 MMHG | DIASTOLIC BLOOD PRESSURE: 45 MMHG

## 2018-02-25 VITALS — DIASTOLIC BLOOD PRESSURE: 62 MMHG | SYSTOLIC BLOOD PRESSURE: 111 MMHG

## 2018-02-25 VITALS — SYSTOLIC BLOOD PRESSURE: 87 MMHG | DIASTOLIC BLOOD PRESSURE: 64 MMHG

## 2018-02-25 VITALS — DIASTOLIC BLOOD PRESSURE: 37 MMHG | SYSTOLIC BLOOD PRESSURE: 97 MMHG

## 2018-02-25 VITALS — SYSTOLIC BLOOD PRESSURE: 86 MMHG | DIASTOLIC BLOOD PRESSURE: 64 MMHG

## 2018-02-25 VITALS — SYSTOLIC BLOOD PRESSURE: 101 MMHG | DIASTOLIC BLOOD PRESSURE: 35 MMHG

## 2018-02-25 VITALS — SYSTOLIC BLOOD PRESSURE: 101 MMHG | DIASTOLIC BLOOD PRESSURE: 64 MMHG

## 2018-02-25 VITALS — DIASTOLIC BLOOD PRESSURE: 45 MMHG | SYSTOLIC BLOOD PRESSURE: 96 MMHG

## 2018-02-25 VITALS — DIASTOLIC BLOOD PRESSURE: 53 MMHG | SYSTOLIC BLOOD PRESSURE: 91 MMHG

## 2018-02-25 VITALS — DIASTOLIC BLOOD PRESSURE: 64 MMHG | SYSTOLIC BLOOD PRESSURE: 97 MMHG

## 2018-02-25 VITALS — DIASTOLIC BLOOD PRESSURE: 50 MMHG | SYSTOLIC BLOOD PRESSURE: 87 MMHG

## 2018-02-25 VITALS — SYSTOLIC BLOOD PRESSURE: 104 MMHG | DIASTOLIC BLOOD PRESSURE: 44 MMHG

## 2018-02-25 VITALS — SYSTOLIC BLOOD PRESSURE: 98 MMHG | DIASTOLIC BLOOD PRESSURE: 50 MMHG

## 2018-02-25 VITALS — DIASTOLIC BLOOD PRESSURE: 31 MMHG | SYSTOLIC BLOOD PRESSURE: 95 MMHG

## 2018-02-25 VITALS — SYSTOLIC BLOOD PRESSURE: 99 MMHG | DIASTOLIC BLOOD PRESSURE: 64 MMHG

## 2018-02-25 VITALS — SYSTOLIC BLOOD PRESSURE: 101 MMHG | DIASTOLIC BLOOD PRESSURE: 43 MMHG

## 2018-02-25 VITALS — SYSTOLIC BLOOD PRESSURE: 98 MMHG | DIASTOLIC BLOOD PRESSURE: 62 MMHG

## 2018-02-25 VITALS — DIASTOLIC BLOOD PRESSURE: 30 MMHG | SYSTOLIC BLOOD PRESSURE: 102 MMHG

## 2018-02-25 VITALS — DIASTOLIC BLOOD PRESSURE: 42 MMHG | SYSTOLIC BLOOD PRESSURE: 104 MMHG

## 2018-02-25 VITALS — DIASTOLIC BLOOD PRESSURE: 49 MMHG | SYSTOLIC BLOOD PRESSURE: 95 MMHG

## 2018-02-25 VITALS — DIASTOLIC BLOOD PRESSURE: 55 MMHG | SYSTOLIC BLOOD PRESSURE: 99 MMHG

## 2018-02-25 VITALS — SYSTOLIC BLOOD PRESSURE: 98 MMHG | DIASTOLIC BLOOD PRESSURE: 55 MMHG

## 2018-02-25 VITALS — DIASTOLIC BLOOD PRESSURE: 47 MMHG | SYSTOLIC BLOOD PRESSURE: 97 MMHG

## 2018-02-25 VITALS — SYSTOLIC BLOOD PRESSURE: 100 MMHG | DIASTOLIC BLOOD PRESSURE: 55 MMHG

## 2018-02-25 VITALS — DIASTOLIC BLOOD PRESSURE: 58 MMHG | SYSTOLIC BLOOD PRESSURE: 84 MMHG

## 2018-02-25 VITALS — DIASTOLIC BLOOD PRESSURE: 52 MMHG | SYSTOLIC BLOOD PRESSURE: 101 MMHG

## 2018-02-25 VITALS — DIASTOLIC BLOOD PRESSURE: 59 MMHG | SYSTOLIC BLOOD PRESSURE: 94 MMHG

## 2018-02-25 VITALS — SYSTOLIC BLOOD PRESSURE: 101 MMHG | DIASTOLIC BLOOD PRESSURE: 60 MMHG

## 2018-02-25 VITALS — SYSTOLIC BLOOD PRESSURE: 126 MMHG | DIASTOLIC BLOOD PRESSURE: 63 MMHG

## 2018-02-25 VITALS — DIASTOLIC BLOOD PRESSURE: 52 MMHG | SYSTOLIC BLOOD PRESSURE: 106 MMHG

## 2018-02-25 VITALS — SYSTOLIC BLOOD PRESSURE: 95 MMHG | DIASTOLIC BLOOD PRESSURE: 66 MMHG

## 2018-02-25 LAB
BASE EXCESS BLDA CALC-SCNC: -26.7 MMOL/L
BASOPHILS # BLD AUTO: 0 /CMM (ref 0–0.2)
BASOPHILS NFR BLD AUTO: 0 % (ref 0–2)
BUN SERPL-MCNC: 101 MG/DL (ref 7–18)
CALCIUM SERPL-MCNC: 7.9 MG/DL (ref 8.5–10.1)
CHLORIDE SERPL-SCNC: 113 MMOL/L (ref 98–107)
CO2 SERPL-SCNC: 9 MMOL/L (ref 21–32)
CREAT SERPL-MCNC: 2.4 MG/DL (ref 0.6–1.3)
DO-HGB MFR BLDA: 81.9 MMHG
EOSINOPHIL # BLD AUTO: 0 /CMM (ref 0–0.7)
EOSINOPHIL NFR BLD AUTO: 0 % (ref 0–6)
GLUCOSE SERPL-MCNC: 123 MG/DL (ref 74–106)
HCT VFR BLD AUTO: 26 % (ref 33–45)
HGB BLD-MCNC: 8.6 G/DL (ref 11.5–14.8)
INHALED O2 CONCENTRATION: 30 %
LYMPHOCYTES NFR BLD AUTO: 0.4 /CMM (ref 0.8–4.8)
LYMPHOCYTES NFR BLD AUTO: 2.4 % (ref 20–44)
MAGNESIUM SERPL-MCNC: 1.8 MG/DL (ref 1.8–2.4)
MCH RBC QN AUTO: 30 PG (ref 26–33)
MCHC RBC AUTO-ENTMCNC: 33 G/DL (ref 31–36)
MCV RBC AUTO: 91 FL (ref 82–100)
MONOCYTES NFR BLD AUTO: 0.1 /CMM (ref 0.1–1.3)
MONOCYTES NFR BLD AUTO: 0.5 % (ref 2–12)
NEUTROPHILS # BLD AUTO: 14 /CMM (ref 1.8–8.9)
NEUTROPHILS NFR BLD AUTO: 97.1 % (ref 43–81)
PCO2 TEMP ADJ BLDA: 20.5 MMHG (ref 35–45)
PH TEMP ADJ BLDA: 6.92 [PH] (ref 7.35–7.45)
PHOSPHATE SERPL-MCNC: 7 MG/DL (ref 2.5–4.9)
PLATELET # BLD AUTO: 113 /CMM (ref 150–450)
PO2 TEMP ADJ BLDA: 107.9 MMHG (ref 75–100)
POTASSIUM SERPL-SCNC: 3.5 MMOL/L (ref 3.5–5.1)
RBC # BLD AUTO: 2.86 MIL/UL (ref 4–5.2)
RDW COEFFICIENT OF VARIATION: 17.1 (ref 11.5–15)
SAO2 % BLDA: 94.4 % (ref 92–98.5)
SODIUM SERPL-SCNC: 144 MMOL/L (ref 136–145)
VENTILATION MODE VENT: (no result)
WBC NRBC COR # BLD AUTO: 14.4 K/UL (ref 4.3–11)

## 2018-02-25 RX ADMIN — LINEZOLID SCH MG: 600 TABLET, FILM COATED ORAL at 08:40

## 2018-02-25 RX ADMIN — SODIUM CHLORIDE SCH MG: 9 INJECTION, SOLUTION INTRAVENOUS at 08:40

## 2018-02-25 RX ADMIN — SEVELAMER CARBONATE SCH GM: 800 POWDER, FOR SUSPENSION ORAL at 12:48

## 2018-02-25 RX ADMIN — Medication SCH MLS/HR: at 21:25

## 2018-02-25 RX ADMIN — SODIUM CHLORIDE SCH MG: 9 INJECTION, SOLUTION INTRAVENOUS at 20:32

## 2018-02-25 RX ADMIN — Medication SCH APPLIC: at 08:41

## 2018-02-25 RX ADMIN — SODIUM CHLORIDE PRN MLS/HR: 9 INJECTION, SOLUTION INTRAVENOUS at 07:01

## 2018-02-25 RX ADMIN — SEVELAMER CARBONATE SCH GM: 800 POWDER, FOR SUSPENSION ORAL at 18:08

## 2018-02-25 RX ADMIN — MUPIROCIN SCH APPLIC: 20 OINTMENT TOPICAL at 20:32

## 2018-02-25 RX ADMIN — SODIUM CITRATE AND CITRIC ACID MONOHYDRATE SCH ML: 500; 334 SOLUTION ORAL at 08:40

## 2018-02-25 RX ADMIN — HYDROCORTISONE SODIUM SUCCINATE SCH MG: 100 INJECTION, POWDER, FOR SOLUTION INTRAMUSCULAR; INTRAVASCULAR at 04:03

## 2018-02-25 RX ADMIN — Medication SCH EACH: at 18:08

## 2018-02-25 RX ADMIN — DEXTROSE MONOHYDRATE PRN MLS/HR: 50 INJECTION, SOLUTION INTRAVENOUS at 13:29

## 2018-02-25 RX ADMIN — DEXTROSE MONOHYDRATE PRN MG: 50 INJECTION, SOLUTION INTRAVENOUS at 08:59

## 2018-02-25 RX ADMIN — DEXTROSE MONOHYDRATE PRN MLS/HR: 50 INJECTION, SOLUTION INTRAVENOUS at 18:17

## 2018-02-25 RX ADMIN — SODIUM CHLORIDE PRN MLS/HR: 9 INJECTION, SOLUTION INTRAVENOUS at 06:52

## 2018-02-25 RX ADMIN — Medication SCH TAB: at 08:40

## 2018-02-25 RX ADMIN — MEROPENEM SCH MLS/HR: 500 INJECTION INTRAVENOUS at 08:40

## 2018-02-25 RX ADMIN — HYDROCORTISONE SODIUM SUCCINATE SCH MG: 100 INJECTION, POWDER, FOR SOLUTION INTRAMUSCULAR; INTRAVASCULAR at 12:48

## 2018-02-25 RX ADMIN — MEROPENEM SCH MLS/HR: 500 INJECTION INTRAVENOUS at 19:15

## 2018-02-25 RX ADMIN — HYDROCORTISONE SODIUM SUCCINATE SCH MG: 100 INJECTION, POWDER, FOR SOLUTION INTRAMUSCULAR; INTRAVASCULAR at 20:32

## 2018-02-25 RX ADMIN — MUPIROCIN SCH APPLIC: 20 OINTMENT TOPICAL at 08:41

## 2018-02-25 RX ADMIN — SODIUM CHLORIDE SCH MLS/HR: 9 INJECTION, SOLUTION INTRAVENOUS at 20:01

## 2018-02-25 NOTE — NUR
RT

PATIENT REC'D TRACHED ON [facility] VENT WITH SETTINGS SET BY MD. VENT ALARMS CHECKED + AUDIBLE. 
CUFF PRESSURE CHECKED MLT. SX'D WITH CHUCK VERA. B/S DIM COARSE. AMBU 
BAG AT HOB

-------------------------------------------------------------------------------

Addendum: 02/25/18 at 1757 by MARIA FERNANDA TIAN RT

-------------------------------------------------------------------------------

Amended: Links added.

## 2018-02-25 NOTE — NUR
ICU RN NOTE



0720:Received patient obtunded. With trache to vent, tolerated settings at this time. No 
respiratory distress noted. HOB elevated. GT intact, will monitor if tolerated. With RIJ TLC 
intact, IVF infusing as ordered, Levo @ 16mcg, will titrate as ordered. On isolation prec 
for wound Acineto/VRE. 



0800: S/E by Dr. Sommer, no new order at this time.



0845: With episode of vomiting x1, held GT feeding and given Zofran, kept HOB elevated. Will 
continue to monitor. 



1100: No significant changes noted at this time. VSS, still on Levo 16mcg. Kept clean, warm 
and dry.

## 2018-02-25 NOTE — NUR
pt received on vent via charted route and settings. ambu bag at bedside alarms set and 
audible. disconnect alarms checked. vent plugged into red outlet. tolerating vent settings 
at ths time. will continue to monitor

-------------------------------------------------------------------------------

Addendum: 02/25/18 at 2151 by KARTIK IKM RT

-------------------------------------------------------------------------------

Amended: Links added.

## 2018-02-25 NOTE — NUR
RN/ICU NOTES:



GASTRIC RESIDUAL CHECKED IT WAS 35 ML. RESUMED GTF AT 25 ML/HR. WILL CONTINUE TO MONITOR.

## 2018-02-25 NOTE — NUR
RN/ICU NOTES:



CHARGE NURSE ED REPORTED CO2 9/. WILL ENDORSE AM SHIFT NURSE TO F/U REGARDING LABS.

## 2018-02-26 VITALS — SYSTOLIC BLOOD PRESSURE: 93 MMHG | DIASTOLIC BLOOD PRESSURE: 32 MMHG

## 2018-02-26 VITALS — SYSTOLIC BLOOD PRESSURE: 33 MMHG | DIASTOLIC BLOOD PRESSURE: 18 MMHG

## 2018-02-26 VITALS — SYSTOLIC BLOOD PRESSURE: 92 MMHG | DIASTOLIC BLOOD PRESSURE: 41 MMHG

## 2018-02-26 VITALS — DIASTOLIC BLOOD PRESSURE: 55 MMHG | SYSTOLIC BLOOD PRESSURE: 108 MMHG

## 2018-02-26 VITALS — DIASTOLIC BLOOD PRESSURE: 64 MMHG | SYSTOLIC BLOOD PRESSURE: 94 MMHG

## 2018-02-26 VITALS — DIASTOLIC BLOOD PRESSURE: 39 MMHG | SYSTOLIC BLOOD PRESSURE: 94 MMHG

## 2018-02-26 VITALS — DIASTOLIC BLOOD PRESSURE: 40 MMHG | SYSTOLIC BLOOD PRESSURE: 71 MMHG

## 2018-02-26 VITALS — SYSTOLIC BLOOD PRESSURE: 105 MMHG | DIASTOLIC BLOOD PRESSURE: 55 MMHG

## 2018-02-26 VITALS — SYSTOLIC BLOOD PRESSURE: 97 MMHG | DIASTOLIC BLOOD PRESSURE: 46 MMHG

## 2018-02-26 VITALS — SYSTOLIC BLOOD PRESSURE: 84 MMHG | DIASTOLIC BLOOD PRESSURE: 37 MMHG

## 2018-02-26 VITALS — DIASTOLIC BLOOD PRESSURE: 38 MMHG | SYSTOLIC BLOOD PRESSURE: 105 MMHG

## 2018-02-26 VITALS — DIASTOLIC BLOOD PRESSURE: 46 MMHG | SYSTOLIC BLOOD PRESSURE: 92 MMHG

## 2018-02-26 VITALS — SYSTOLIC BLOOD PRESSURE: 100 MMHG | DIASTOLIC BLOOD PRESSURE: 36 MMHG

## 2018-02-26 VITALS — SYSTOLIC BLOOD PRESSURE: 85 MMHG | DIASTOLIC BLOOD PRESSURE: 47 MMHG

## 2018-02-26 VITALS — SYSTOLIC BLOOD PRESSURE: 104 MMHG | DIASTOLIC BLOOD PRESSURE: 48 MMHG

## 2018-02-26 VITALS — DIASTOLIC BLOOD PRESSURE: 35 MMHG | SYSTOLIC BLOOD PRESSURE: 98 MMHG

## 2018-02-26 VITALS — SYSTOLIC BLOOD PRESSURE: 98 MMHG | DIASTOLIC BLOOD PRESSURE: 61 MMHG

## 2018-02-26 VITALS — DIASTOLIC BLOOD PRESSURE: 27 MMHG | SYSTOLIC BLOOD PRESSURE: 93 MMHG

## 2018-02-26 VITALS — DIASTOLIC BLOOD PRESSURE: 26 MMHG | SYSTOLIC BLOOD PRESSURE: 71 MMHG

## 2018-02-26 VITALS — SYSTOLIC BLOOD PRESSURE: 91 MMHG | DIASTOLIC BLOOD PRESSURE: 31 MMHG

## 2018-02-26 VITALS — DIASTOLIC BLOOD PRESSURE: 31 MMHG | SYSTOLIC BLOOD PRESSURE: 90 MMHG

## 2018-02-26 VITALS — SYSTOLIC BLOOD PRESSURE: 102 MMHG | DIASTOLIC BLOOD PRESSURE: 64 MMHG

## 2018-02-26 VITALS — SYSTOLIC BLOOD PRESSURE: 93 MMHG | DIASTOLIC BLOOD PRESSURE: 47 MMHG

## 2018-02-26 VITALS — DIASTOLIC BLOOD PRESSURE: 74 MMHG | SYSTOLIC BLOOD PRESSURE: 90 MMHG

## 2018-02-26 VITALS — DIASTOLIC BLOOD PRESSURE: 61 MMHG | SYSTOLIC BLOOD PRESSURE: 98 MMHG

## 2018-02-26 VITALS — DIASTOLIC BLOOD PRESSURE: 48 MMHG | SYSTOLIC BLOOD PRESSURE: 69 MMHG

## 2018-02-26 VITALS — SYSTOLIC BLOOD PRESSURE: 91 MMHG | DIASTOLIC BLOOD PRESSURE: 48 MMHG

## 2018-02-26 VITALS — DIASTOLIC BLOOD PRESSURE: 29 MMHG | SYSTOLIC BLOOD PRESSURE: 100 MMHG

## 2018-02-26 VITALS — DIASTOLIC BLOOD PRESSURE: 51 MMHG | SYSTOLIC BLOOD PRESSURE: 101 MMHG

## 2018-02-26 VITALS — DIASTOLIC BLOOD PRESSURE: 42 MMHG | SYSTOLIC BLOOD PRESSURE: 88 MMHG

## 2018-02-26 VITALS — DIASTOLIC BLOOD PRESSURE: 62 MMHG | SYSTOLIC BLOOD PRESSURE: 77 MMHG

## 2018-02-26 VITALS — DIASTOLIC BLOOD PRESSURE: 50 MMHG | SYSTOLIC BLOOD PRESSURE: 66 MMHG

## 2018-02-26 VITALS — DIASTOLIC BLOOD PRESSURE: 29 MMHG | SYSTOLIC BLOOD PRESSURE: 96 MMHG

## 2018-02-26 VITALS — DIASTOLIC BLOOD PRESSURE: 29 MMHG | SYSTOLIC BLOOD PRESSURE: 92 MMHG

## 2018-02-26 VITALS — SYSTOLIC BLOOD PRESSURE: 88 MMHG | DIASTOLIC BLOOD PRESSURE: 45 MMHG

## 2018-02-26 VITALS — DIASTOLIC BLOOD PRESSURE: 39 MMHG | SYSTOLIC BLOOD PRESSURE: 92 MMHG

## 2018-02-26 VITALS — SYSTOLIC BLOOD PRESSURE: 98 MMHG | DIASTOLIC BLOOD PRESSURE: 48 MMHG

## 2018-02-26 VITALS — SYSTOLIC BLOOD PRESSURE: 100 MMHG | DIASTOLIC BLOOD PRESSURE: 29 MMHG

## 2018-02-26 VITALS — DIASTOLIC BLOOD PRESSURE: 51 MMHG | SYSTOLIC BLOOD PRESSURE: 79 MMHG

## 2018-02-26 VITALS — SYSTOLIC BLOOD PRESSURE: 104 MMHG | DIASTOLIC BLOOD PRESSURE: 65 MMHG

## 2018-02-26 VITALS — SYSTOLIC BLOOD PRESSURE: 89 MMHG | DIASTOLIC BLOOD PRESSURE: 47 MMHG

## 2018-02-26 VITALS — DIASTOLIC BLOOD PRESSURE: 29 MMHG | SYSTOLIC BLOOD PRESSURE: 89 MMHG

## 2018-02-26 VITALS — SYSTOLIC BLOOD PRESSURE: 101 MMHG | DIASTOLIC BLOOD PRESSURE: 73 MMHG

## 2018-02-26 VITALS — DIASTOLIC BLOOD PRESSURE: 69 MMHG | SYSTOLIC BLOOD PRESSURE: 108 MMHG

## 2018-02-26 VITALS — SYSTOLIC BLOOD PRESSURE: 96 MMHG | DIASTOLIC BLOOD PRESSURE: 24 MMHG

## 2018-02-26 VITALS — SYSTOLIC BLOOD PRESSURE: 66 MMHG | DIASTOLIC BLOOD PRESSURE: 49 MMHG

## 2018-02-26 VITALS — DIASTOLIC BLOOD PRESSURE: 27 MMHG | SYSTOLIC BLOOD PRESSURE: 96 MMHG

## 2018-02-26 VITALS — SYSTOLIC BLOOD PRESSURE: 84 MMHG | DIASTOLIC BLOOD PRESSURE: 55 MMHG

## 2018-02-26 VITALS — DIASTOLIC BLOOD PRESSURE: 35 MMHG | SYSTOLIC BLOOD PRESSURE: 88 MMHG

## 2018-02-26 VITALS — SYSTOLIC BLOOD PRESSURE: 104 MMHG | DIASTOLIC BLOOD PRESSURE: 61 MMHG

## 2018-02-26 VITALS — SYSTOLIC BLOOD PRESSURE: 99 MMHG | DIASTOLIC BLOOD PRESSURE: 36 MMHG

## 2018-02-26 VITALS — DIASTOLIC BLOOD PRESSURE: 50 MMHG | SYSTOLIC BLOOD PRESSURE: 98 MMHG

## 2018-02-26 VITALS — DIASTOLIC BLOOD PRESSURE: 39 MMHG | SYSTOLIC BLOOD PRESSURE: 90 MMHG

## 2018-02-26 VITALS — SYSTOLIC BLOOD PRESSURE: 91 MMHG | DIASTOLIC BLOOD PRESSURE: 45 MMHG

## 2018-02-26 VITALS — DIASTOLIC BLOOD PRESSURE: 42 MMHG | SYSTOLIC BLOOD PRESSURE: 97 MMHG

## 2018-02-26 VITALS — DIASTOLIC BLOOD PRESSURE: 52 MMHG | SYSTOLIC BLOOD PRESSURE: 69 MMHG

## 2018-02-26 VITALS — SYSTOLIC BLOOD PRESSURE: 72 MMHG | DIASTOLIC BLOOD PRESSURE: 30 MMHG

## 2018-02-26 VITALS — DIASTOLIC BLOOD PRESSURE: 39 MMHG | SYSTOLIC BLOOD PRESSURE: 96 MMHG

## 2018-02-26 VITALS — DIASTOLIC BLOOD PRESSURE: 39 MMHG | SYSTOLIC BLOOD PRESSURE: 97 MMHG

## 2018-02-26 VITALS — DIASTOLIC BLOOD PRESSURE: 41 MMHG | SYSTOLIC BLOOD PRESSURE: 99 MMHG

## 2018-02-26 VITALS — SYSTOLIC BLOOD PRESSURE: 84 MMHG | DIASTOLIC BLOOD PRESSURE: 50 MMHG

## 2018-02-26 VITALS — SYSTOLIC BLOOD PRESSURE: 86 MMHG | DIASTOLIC BLOOD PRESSURE: 46 MMHG

## 2018-02-26 VITALS — DIASTOLIC BLOOD PRESSURE: 70 MMHG | SYSTOLIC BLOOD PRESSURE: 91 MMHG

## 2018-02-26 VITALS — SYSTOLIC BLOOD PRESSURE: 93 MMHG | DIASTOLIC BLOOD PRESSURE: 42 MMHG

## 2018-02-26 VITALS — DIASTOLIC BLOOD PRESSURE: 64 MMHG | SYSTOLIC BLOOD PRESSURE: 99 MMHG

## 2018-02-26 VITALS — SYSTOLIC BLOOD PRESSURE: 88 MMHG | DIASTOLIC BLOOD PRESSURE: 50 MMHG

## 2018-02-26 VITALS — DIASTOLIC BLOOD PRESSURE: 51 MMHG | SYSTOLIC BLOOD PRESSURE: 115 MMHG

## 2018-02-26 LAB
BASE EXCESS BLDA CALC-SCNC: -31.7 MMOL/L
BASOPHILS # BLD AUTO: 0.1 /CMM (ref 0–0.2)
BASOPHILS NFR BLD AUTO: 0.9 % (ref 0–2)
BUN SERPL-MCNC: 102 MG/DL (ref 7–18)
CALCIUM SERPL-MCNC: 7.7 MG/DL (ref 8.5–10.1)
CHLORIDE SERPL-SCNC: 107 MMOL/L (ref 98–107)
CO2 SERPL-SCNC: 5 MMOL/L (ref 21–32)
CREAT SERPL-MCNC: 2.4 MG/DL (ref 0.6–1.3)
DO-HGB MFR BLDA: 58.9 MMHG
EOSINOPHIL # BLD AUTO: 0.1 /CMM (ref 0–0.7)
EOSINOPHIL NFR BLD AUTO: 0.4 % (ref 0–6)
GLUCOSE SERPL-MCNC: 195 MG/DL (ref 74–106)
HCT VFR BLD AUTO: 18 % (ref 33–45)
HGB BLD-MCNC: 5.7 G/DL (ref 11.5–14.8)
INHALED O2 CONCENTRATION: 30 %
LYMPHOCYTES NFR BLD AUTO: 0.9 /CMM (ref 0.8–4.8)
LYMPHOCYTES NFR BLD AUTO: 5.2 % (ref 20–44)
LYMPHOCYTES NFR BLD MANUAL: 7 % (ref 16–48)
MAGNESIUM SERPL-MCNC: 1.9 MG/DL (ref 1.8–2.4)
MCH RBC QN AUTO: 30 PG (ref 26–33)
MCHC RBC AUTO-ENTMCNC: 32 G/DL (ref 31–36)
MCV RBC AUTO: 94 FL (ref 82–100)
MONOCYTES NFR BLD AUTO: 0.2 /CMM (ref 0.1–1.3)
MONOCYTES NFR BLD AUTO: 1.1 % (ref 2–12)
MONOCYTES NFR BLD MANUAL: 2 % (ref 0–11)
MYELOCYTES NFR BLD MANUAL: 1 % (ref 0–0)
NEUTROPHILS # BLD AUTO: 15.5 /CMM (ref 1.8–8.9)
NEUTROPHILS NFR BLD AUTO: 92.4 % (ref 43–81)
NEUTS BAND NFR BLD MANUAL: 11 % (ref 0–5)
NEUTS SEG NFR BLD MANUAL: 79 % (ref 42–76)
PCO2 TEMP ADJ BLDA: 19.9 MMHG (ref 35–45)
PH TEMP ADJ BLDA: 6.67 [PH] (ref 7.35–7.45)
PHOSPHATE SERPL-MCNC: 9.4 MG/DL (ref 2.5–4.9)
PLATELET # BLD AUTO: 61 /CMM (ref 150–450)
PO2 TEMP ADJ BLDA: 131.6 MMHG (ref 75–100)
POTASSIUM SERPL-SCNC: 4.2 MMOL/L (ref 3.5–5.1)
RBC # BLD AUTO: 1.9 MIL/UL (ref 4–5.2)
RDW COEFFICIENT OF VARIATION: 18.7 (ref 11.5–15)
SAO2 % BLDA: 95.8 % (ref 92–98.5)
SODIUM SERPL-SCNC: 143 MMOL/L (ref 136–145)
WBC NRBC COR # BLD AUTO: 16.8 K/UL (ref 4.3–11)

## 2018-02-26 PROCEDURE — B543ZZA ULTRASONOGRAPHY OF RIGHT JUGULAR VEINS, GUIDANCE: ICD-10-PCS | Performed by: SURGERY

## 2018-02-26 PROCEDURE — 05HM33Z INSERTION OF INFUSION DEVICE INTO RIGHT INTERNAL JUGULAR VEIN, PERCUTANEOUS APPROACH: ICD-10-PCS | Performed by: SURGERY

## 2018-02-26 RX ADMIN — DEXTROSE MONOHYDRATE PRN MLS/HR: 50 INJECTION, SOLUTION INTRAVENOUS at 13:52

## 2018-02-26 RX ADMIN — SEVELAMER CARBONATE SCH GM: 800 POWDER, FOR SUSPENSION ORAL at 12:27

## 2018-02-26 RX ADMIN — SODIUM CHLORIDE SCH MG: 9 INJECTION, SOLUTION INTRAVENOUS at 08:39

## 2018-02-26 RX ADMIN — Medication SCH EACH: at 16:55

## 2018-02-26 RX ADMIN — HYDROCORTISONE SODIUM SUCCINATE SCH MG: 100 INJECTION, POWDER, FOR SOLUTION INTRAMUSCULAR; INTRAVASCULAR at 05:16

## 2018-02-26 RX ADMIN — DEXTROSE MONOHYDRATE PRN MG: 50 INJECTION, SOLUTION INTRAVENOUS at 05:16

## 2018-02-26 RX ADMIN — DEXTROSE MONOHYDRATE PRN MLS/HR: 50 INJECTION, SOLUTION INTRAVENOUS at 12:54

## 2018-02-26 RX ADMIN — Medication PRN OZ: at 08:40

## 2018-02-26 RX ADMIN — DEXTROSE MONOHYDRATE PRN MLS/HR: 50 INJECTION, SOLUTION INTRAVENOUS at 18:35

## 2018-02-26 RX ADMIN — Medication SCH APPLIC: at 08:41

## 2018-02-26 RX ADMIN — SEVELAMER CARBONATE SCH GM: 800 POWDER, FOR SUSPENSION ORAL at 08:00

## 2018-02-26 RX ADMIN — MEROPENEM SCH MLS/HR: 500 INJECTION INTRAVENOUS at 08:39

## 2018-02-26 RX ADMIN — HYDROCORTISONE SODIUM SUCCINATE SCH MG: 100 INJECTION, POWDER, FOR SOLUTION INTRAMUSCULAR; INTRAVASCULAR at 12:29

## 2018-02-26 RX ADMIN — DEXTROSE MONOHYDRATE PRN MLS/HR: 50 INJECTION, SOLUTION INTRAVENOUS at 01:46

## 2018-02-26 RX ADMIN — MUPIROCIN SCH APPLIC: 20 OINTMENT TOPICAL at 08:42

## 2018-02-26 RX ADMIN — Medication SCH TAB: at 08:40

## 2018-02-26 RX ADMIN — Medication SCH MLS/HR: at 10:41

## 2018-02-26 RX ADMIN — DEXTROSE MONOHYDRATE PRN MLS/HR: 50 INJECTION, SOLUTION INTRAVENOUS at 06:11

## 2018-02-26 RX ADMIN — SEVELAMER CARBONATE SCH GM: 800 POWDER, FOR SUSPENSION ORAL at 16:56

## 2018-02-26 RX ADMIN — Medication SCH EACH: at 08:39

## 2018-02-26 NOTE — NUR
PATIENT PLACED ON 60% FIO2 FOR DESATURATION


-------------------------------------------------------------------------------

Addendum: 02/26/18 at 1317 by MARIA FERNANDA TIAN RT

-------------------------------------------------------------------------------

Amended: Links added.

## 2018-02-26 NOTE — NUR
ICU/RN

PM CARE PROVIDED.WOUND DRESSING DONE AS ORDERED.PT IS BLEEDING FROM THE RECTUM, SACRAL 
WOUNDS. ORAL BLEEDING NOTED .BP DECREASED.MD NOTIFIED.NEW ORDERS RECEIVED.G-TUBE SIDE 
BLEEDING NOTED.GENERALIZED WEEPING EDEMA NOTED.

## 2018-02-26 NOTE — NUR
RN DEATH NOTE

PT MADE DNR STATUS BY FAMILY.  PT NOW ASYSTOLIC, NO SPONTANEOUS RESPIRATIONS, NO PALPABLE 
PULSES.  PUPILS FIXED AND DILATED.  PRONOUNCED DEAD.

## 2018-02-26 NOTE — NUR
ICU/RN

ABG DONE ,DR TITUS NOTIFIED.NEW ORDERS RECEIVED.2 AMPS OF BICARB IV GIVEN AS ORDERED.FAMILY 
AT BEDSIDE.CONTINUE MONITORING

## 2018-02-26 NOTE — NUR
ICU/RN

FAMILY HAS NO MORTUARY  ARRANGEMENT .BODY WILL GO TO [facility].

## 2018-02-26 NOTE — NUR
RT

PATIENT REC'D TRACHED ON [facility] VENT WITH SETTINGS SET BY MD. VENT ALARMS CHECKED + AUDIBLE. 
CUFF PRESSURE CHECKED MLT. SX'D WITH CHUCK VERA. B/S DIM COARSE. AMBU 
BAG AT HOB

-------------------------------------------------------------------------------

Addendum: 02/26/18 at 0753 by MARIA FERNANDA TIAN RT

-------------------------------------------------------------------------------

Amended: Links added.

## 2018-02-26 NOTE — NUR
ICU RN NOTES

Post mortem care done.No belongings at the bedside.

2115 Brought body down to the morgue.

## 2018-02-26 NOTE — NUR
ICU RN NOTES 

Received report ,patient  @ 1905 .Family still at bedside.ROHAN Gant instructed 
family on what to do next as far as post mortem care,psychological support to family 
rendered as well.

## 2018-02-26 NOTE — NUR
ICU/RN

PT IS ON 3 PRESSORS MAXIMUM DOSE,ON BICARB DRIP.PT HAS ORAL AND NOSE BLEEDING.FAMILY AT 
BEDSIDE.MAKE PATIENT DNR CODE STATUS.MD NOTIFIED.

## 2018-02-26 NOTE — NUR
ICU/RN

PT IS CHRONIC TRACH ON THE VENT AC MODE.SAT O2-100%.ON LEVOPHED.COMATOSE . ANURIC . ESRD 
WAITING FOR NEW HD CATH PLACEMENT.GENERALIZED WEEPING EDEMA NOTED.IV INFUSING AS 
ORDERED.AFEBRILE.MULTIPLY WOUNDS NOTED ALL OVER THE BODY  COVERED WITH DRESSING.PT HAS 
G-TUBE CLAMPED.NPO,DUE TO VOMITING.SUCTION PROVIDED.BLOODY SECRETION NOTED.LABS REVIEW. 2 
UNITS PRBC ORDERED.REPOSITION FOR COMFORT.

## 2018-02-26 NOTE — NUR
ICU/RN

DR MALDONADO PLACED NEW HD CATH.PT IS BLEEDING FROM THE SIDE .PRESSURE DRESSING APPLIED. 2 
UNITS PRBC GIVEN AS ORDERED.PT TOLERATED WELL,NO S/S OF REACTION NOTED.

## 2019-04-05 NOTE — NUR
ICU RN INITIAL NOTE

RECEIVED REPORT FROM JAM MADRIGAL. PT IN BED. TRACHED AND VENTED. SHILEY 6. AC 12, , 
FIO2 30, PEEP 5. LUNG SOUNDS RHONCHI. PUPILS PINPOINT AND NON REACTIVE. OBTUNDED. GT PATENT 
AND INTACT. RIGHT IJ TLC PATENT AND INTACT . HD NURSE REMOVED RIGHT GROIN HD CATH AS PER DR KAT. GWENURIC. MULTIPLE SKIN ISSUES. BED IN LOW LOCKED POSITION. WILL CONTINUE TO MONITOR. Please make an appointment to see Dr. Potter on Monday.

## 2020-08-03 NOTE — NUR
TELE RN AM NOTES



RECEIVED PATIENT OBTUNDED, NON-VERBAL, SHILEY 6 TRACH TO VENT, SETTING- VENT SETTINGS AC 12, 
, FIO2 40%, PEEP 5, SPO2 100%. NOT IN ANY IN ANY DISTRESS, RESPIRATION UNLABORED, 
EQUAL. TELEMETRY READS SR HR 78, NO SIGNS OF PAIN, WITH DELMA MIDLINE PATENT. SITE CLEAR. 
RIGHT FEMORAL TLC, PATENT AND INTACT. WITH GTF AT 25ML/HR.  O RESIDUAL. HOB ELEVATED.  SIDE 
RAILS UP AND LOCKED.  BED KEPT AT LOWEST POSITION.  ISOLATION PRECAUTIONS OBSERVED.  WILL 
CONTINUE TO MONITOR. Hypertriglyceridemia Monitoring: I explained this is common when taking isotretinoin. We will monitor closely.

## 2023-10-27 NOTE — NUR
TELE RN CLOSING NOTES



NO SIGNIFICANT CHANGES OVERNIGHT.  TOLERATING GTF AT 40ML/HR.  NO RESPIRATORY DISTRESS 
NOTED.  TOLERATING CURRENT VENT SETTINGS, SPO2 100%.  SKIN COOL AND DRY TO TOUCH.  PATIENT 
WITH NS AT 100ML/HR, RECEIVED NEW ORDER TO DECREASED TO 50ML/HR.  SR ON TELE MONITOR.  F/C 
IN PLACE, DRAINING BY GRAVITY.   KEPT CLEAN AND DRY.  WOUND TX AS ORDERED.  TURNED AND 
REPOSITIONED Q2 AND PRN.  SIDE RAILS UP AND LOCKED.  BED KEPT AT LOWEST POSITION.  WILL 
ENDORSE CONTINUITY OF CARE TO AM NURSE. Mastoid Interpolation Flap Text: A decision was made to reconstruct the defect utilizing an interpolation axial flap and a staged reconstruction.  A telfa template was made of the defect.  This telfa template was then used to outline the mastoid interpolation flap.  The donor area for the pedicle flap was then injected with anesthesia.  The flap was excised through the skin and subcutaneous tissue down to the layer of the underlying musculature.  The pedicle flap was carefully excised within this deep plane to maintain its blood supply.  The edges of the donor site were undermined.   The donor site was closed in a primary fashion.  The pedicle was then rotated into position and sutured.  Once the tube was sutured into place, adequate blood supply was confirmed with blanching and refill.  The pedicle was then wrapped with xeroform gauze and dressed appropriately with a telfa and gauze bandage to ensure continued blood supply and protect the attached pedicle.
